# Patient Record
Sex: MALE | Race: BLACK OR AFRICAN AMERICAN | Employment: OTHER | URBAN - METROPOLITAN AREA
[De-identification: names, ages, dates, MRNs, and addresses within clinical notes are randomized per-mention and may not be internally consistent; named-entity substitution may affect disease eponyms.]

---

## 2017-01-10 RX ORDER — FINASTERIDE 5 MG/1
TABLET, FILM COATED ORAL
Qty: 90 TAB | Refills: 0 | Status: SHIPPED | OUTPATIENT
Start: 2017-01-10 | End: 2017-02-21

## 2017-02-21 ENCOUNTER — OFFICE VISIT (OUTPATIENT)
Dept: UROLOGY | Age: 57
End: 2017-02-21

## 2017-02-21 ENCOUNTER — HOSPITAL ENCOUNTER (OUTPATIENT)
Dept: LAB | Age: 57
Discharge: HOME | End: 2017-02-21
Payer: MEDICARE

## 2017-02-21 VITALS
HEIGHT: 69 IN | BODY MASS INDEX: 41.92 KG/M2 | SYSTOLIC BLOOD PRESSURE: 132 MMHG | WEIGHT: 283 LBS | OXYGEN SATURATION: 96 % | HEART RATE: 76 BPM | DIASTOLIC BLOOD PRESSURE: 74 MMHG

## 2017-02-21 DIAGNOSIS — N52.02 CORPORO-VENOUS OCCLUSIVE ERECTILE DYSFUNCTION: Chronic | ICD-10-CM

## 2017-02-21 DIAGNOSIS — N52.02 CORPORO-VENOUS OCCLUSIVE ERECTILE DYSFUNCTION: ICD-10-CM

## 2017-02-21 DIAGNOSIS — N32.81 OAB (OVERACTIVE BLADDER): ICD-10-CM

## 2017-02-21 DIAGNOSIS — R35.1 BPH ASSOCIATED WITH NOCTURIA: Primary | ICD-10-CM

## 2017-02-21 DIAGNOSIS — N40.1 BPH ASSOCIATED WITH NOCTURIA: Primary | ICD-10-CM

## 2017-02-21 DIAGNOSIS — N40.1 BPH ASSOCIATED WITH NOCTURIA: ICD-10-CM

## 2017-02-21 DIAGNOSIS — R35.1 BPH ASSOCIATED WITH NOCTURIA: ICD-10-CM

## 2017-02-21 LAB
BILIRUB UR QL STRIP: NEGATIVE
GLUCOSE UR-MCNC: NORMAL MG/DL
KETONES P FAST UR STRIP-MCNC: NEGATIVE MG/DL
PH UR STRIP: 7.5 [PH] (ref 4.6–8)
PROT UR QL STRIP: NORMAL MG/DL
PSA SERPL-MCNC: 0.5 NG/ML (ref 0–4)
SP GR UR STRIP: 1.02 (ref 1–1.03)
UA UROBILINOGEN AMB POC: NORMAL (ref 0.2–1)
URINALYSIS CLARITY POC: CLEAR
URINALYSIS COLOR POC: YELLOW
URINE BLOOD POC: NORMAL
URINE LEUKOCYTES POC: NORMAL
URINE NITRITES POC: NEGATIVE

## 2017-02-21 PROCEDURE — 84403 ASSAY OF TOTAL TESTOSTERONE: CPT | Performed by: UROLOGY

## 2017-02-21 PROCEDURE — 84153 ASSAY OF PSA TOTAL: CPT | Performed by: UROLOGY

## 2017-02-21 RX ORDER — OXYBUTYNIN CHLORIDE 5 MG/1
5 TABLET ORAL 3 TIMES DAILY
Qty: 60 TAB | Refills: 6 | Status: SHIPPED | OUTPATIENT
Start: 2017-02-21 | End: 2017-02-21

## 2017-02-21 RX ORDER — TAMSULOSIN HYDROCHLORIDE 0.4 MG/1
0.4 CAPSULE ORAL DAILY
Qty: 90 CAP | Refills: 3 | Status: SHIPPED | OUTPATIENT
Start: 2017-02-21 | End: 2017-03-09 | Stop reason: SDUPTHER

## 2017-02-21 NOTE — MR AVS SNAPSHOT
Visit Information Date & Time Provider Department Dept. Phone Encounter #  
 2/21/2017 10:45 AM Andrae Subramanian, [facility] [phone] [id number] Your Appointments 3/9/2017  9:20 AM  
Follow Up with Laura Mccauley MD  
[facility]) Appt Note: Return in about 3 months (around 3/13/2017). with [address] for POA per [address] [facility] [address]  
[phone] [address]  
  
    
 6/8/2017 10:15 AM  
ESTABLISHED PATIENT with Janak Wells MD  
[facility]) Appt Note: 6 months post labs; 6 months post labs/mailed [address] [facility], [address] [facility] [address] 9944 Kiarra Bustamante, [address] Upcoming Health Maintenance Date Due Hepatitis C Screening 1960 HEMOGLOBIN A1C Q6M 1960 FOOT EXAM Q1 3/19/1969 MICROALBUMIN Q1 3/19/1969 EYE EXAM RETINAL OR DILATED Q1 3/19/1969 Pneumococcal 19-64 Medium Risk (1 of 1 - PPSV23) 3/19/1978 DTaP/Tdap/Td series (1 - Tdap) 3/19/1980 COLONOSCOPY 3/19/2014 INFLUENZA AGE 9 TO ADULT 8/1/2016 LIPID PANEL Q1 5/16/2017 Allergies as of 2/21/2017  Review Complete On: 2/21/2017 By: Andrae Subramanian MD  
  
 Severity Noted Reaction Type Reactions Seafood Medium 04/01/2015   Side Effect Hives \"deviled-crabs\"  Does fine with all other seafood Aspirin  03/05/2014    Hives Other reaction(s): Swelling Ditropan [Oxybutynin Chloride]  02/21/2017    Itching Tomato  04/28/2014    Hives Current Immunizations  Never Reviewed Name Date Influenza Vaccine 11/11/2014 Not reviewed this visit You Were Diagnosed With   
  
 Codes Comments BPH associated with nocturia    -  Primary ICD-10-CM: N40.1, R35.1 ICD-9-CM: 600.01, 788.43   
 Corporo-venous occlusive erectile dysfunction     ICD-10-CM: N52.02 
ICD-9-CM: 607.84 Vitals BP Pulse Height(growth percentile) Weight(growth percentile) SpO2 BMI  
 132/74 (BP 1 Location: Right arm, BP Patient Position: Sitting) 76 5' 9\" (1.753 m) 283 lb (128.4 kg) 96% 41.79 kg/m2 Smoking Status Current Some Day Smoker Vitals History BMI and BSA Data Body Mass Index Body Surface Area 41.79 kg/m 2 2.5 m 2 Preferred Pharmacy Pharmacy Name Phone [facility] [address] [facility] [phone] Your Updated Medication List  
  
   
This list is accurate as of: 2/21/17 11:43 AM.  Always use your most recent med list.  
  
  
  
  
 albuterol 90 mcg/actuation inhaler Commonly known as:  PROVENTIL HFA, VENTOLIN HFA, PROAIR HFA  
2 Puffs. amantadine HCl 100 mg capsule Commonly known as:  SYMMETREL  
  
 amLODIPine 10 mg tablet Commonly known as:  Voncile [place] Take 1 Tab by mouth daily. ammonium lactate 12 % lotion Commonly known as:  LAC-HYDRIN  
  
 amoxicillin-clavulanate 875-125 mg per tablet Commonly known as:  AUGMENTIN Azelastine 0.15 % (205.5 mcg) nasal spray Commonly known as:  ASTEPRO  
  
 ciprofloxacin HCl 500 mg tablet Commonly known as:  CIPRO TAKE 1 TABLET BY MOUTH TWICE A DAY FOR 5 DAYS  
  
 clopidogrel 75 mg Tab Commonly known as:  PLAVIX Take 1 Tab by mouth daily. cyclobenzaprine 5 mg tablet Commonly known as:  FLEXERIL Take 5 mg by mouth three (3) times daily as needed for Muscle Spasm(s). Takes 1 to 2 tablets  
  
 docusate sodium 100 mg capsule Commonly known as:  Gevena Living Take 100 mg by mouth daily. escitalopram oxalate 20 mg tablet Commonly known as:  LEXAPRO  
  
 finasteride 5 mg tablet Commonly known as:  PROSCAR Take 1 Tab by mouth daily. Indications: SYMPTOMATIC BENIGN PROSTATIC HYPERPLASIA fluticasone 50 mcg/actuation nasal spray Commonly known as:  Deb Love Two squirts both nostrils at bedtime  
  
 fluticasone-salmeterol 250-50 mcg/dose diskus inhaler Commonly known as:  ADVAIR Take 1 Puff by inhalation every twelve (12) hours. Indications: INTRINSIC ASTHMA  
  
 gabapentin 300 mg capsule Commonly known as:  NEURONTIN Take 1 Cap by mouth three (3) times daily. For nerve pain. 1 cap qhs for 1 wk, then increase to 1 cap BID for 1 wk, then increase to 1 cap TID Insulin Lisp & Lisp Prot (Hum) 100 unit/mL (75-25) Inpn Commonly known as:  HUMALOG 75-25 MIX Inject beneath the skin. NovoLOG Mix 70-30 FlexPen 100 unit/mL (70-30) Inpn Generic drug:  insulin aspart protamine/insulin aspart OTHER  
 Lumbar Traction Belt  
  
 oxybutynin 5 mg tablet Commonly known as:  URQAMBVE Take 1 Tab by mouth three (3) times daily. Indications: BLADDER HYPERACTIVITY  
  
 * oxyCODONE-acetaminophen 5-325 mg per tablet Commonly known as:  PERCOCET Take 1 Tab by mouth every four (4) hours as needed for Pain. Max Daily Amount: 6 Tabs. * oxyCODONE-acetaminophen  mg per tablet Commonly known as:  PERCOCET Take 1 Tab by mouth four (4) times daily as needed for Pain for up to 30 days. Max Daily Amount: 4 Tabs. for chronic, severe, refractory pain  Indications: PAIN Start taking on:  2/22/2017 PATANOL 0.1 % ophthalmic solution Generic drug:  olopatadine  
  
 polyethylene glycol 17 gram/dose powder Commonly known as:  MIRALAX  
  
 prazosin 2 mg capsule Commonly known as:  MINIPRESS Take 1 Cap by mouth nightly. raNITIdine 150 mg tablet Commonly known as:  ZANTAC  
  
 simvastatin 20 mg tablet Commonly known as:  ZOCOR Take 1 Tab by mouth nightly. tamsulosin 0.4 mg capsule Commonly known as:  FLOMAX Take 1 Cap by mouth daily. topiramate 200 mg tablet Commonly known as:  TOPAMAX Take  by mouth two (2) times a day. traZODone 150 mg tablet Commonly known as:  Alexey Harris Take 150 mg by mouth nightly. * trifluoperazine 10 mg tablet Commonly known as:  STELAZINE  
  
 * trifluoperazine 5 mg tablet Commonly known as:  STELAZINE  
TAKE 1 TABLET BY MOUTH AT BEDTIME TAKE WITH 10 MG TABLET = 15 MG TOTAL DOSE AT BEDTIME  
  
 trimethoprim-sulfamethoxazole 160-800 mg per tablet Commonly known as:  BACTRIM DS, SEPTRA DS  
TAKE 1 TABLET BY MOUTH TWICE A DAY  
  
 valsartan-hydroCHLOROthiazide 320-25 mg per tablet Commonly known as:  DIOVAN-HCT  
TAKE 1 TAB BY MOUTH DAILY. vardenafil 20 mg tablet Commonly known as:  LEVITRA Take 20 mg by mouth as needed. ziprasidone 80 mg capsule Commonly known as:  GEODON  
  
 * Notice: This list has 4 medication(s) that are the same as other medications prescribed for you. Read the directions carefully, and ask your doctor or other care provider to review them with you. We Performed the Following AMB POC URINALYSIS DIP STICK AUTO W/O MICRO [76345 CPT(R)] Patient Instructions [facility] Activation Thank you for requesting access to [facility]. Please follow the instructions below to securely access and download your online medical record. [facility] allows you to send messages to your doctor, view your test results, renew your prescriptions, schedule appointments, and more. How Do I Sign Up? 1. In your internet browser, go to [email or web address]. 2. Click on the First Time User? Click Here link in the Sign In box. You will see the New Member Sign Up page. 3. Enter your [facility] Access Code exactly as it appears below. You will not need to use this code after youve completed the sign-up process. If you do not sign up before the expiration date, you must request a new code. [facility] Access Code: Activation code not generated Current [facility] Status: Patient Declined (This is the date your [facility] access code will ) 4. Enter the last four digits of your Social Security Number (xxxx) and Date of Birth (mm/dd/yyyy) as indicated and click Submit. You will be taken to the next sign-up page. 5. Create a [employer] ID. This will be your [employer] login ID and cannot be changed, so think of one that is secure and easy to remember. 6. Create a [employer] password. You can change your password at any time. 7. Enter your Password Reset Question and Answer. This can be used at a later time if you forget your password. 8. Enter your e-mail address. You will receive e-mail notification when new information is available in [address] 9. Click Sign Up. You can now view and download portions of your medical record. 10. Click the Download Summary menu link to download a portable copy of your medical information. Additional Information If you have questions, please visit the Frequently Asked Questions section of the [employer] website at [email or web address] Remember, [employer] is NOT to be used for urgent needs. For medical emergencies, dial 911. Please provide this summary of care documentation to your next provider. Your primary care clinician is listed as RAMAKRISHNA GROSSMAN. If you have any questions after today's visit, please call [phone].

## 2017-02-21 NOTE — PROGRESS NOTES
Mr. Alma Joseph has a reminder for a \"due or due soon\" health maintenance. I have asked that he contact his primary care provider for follow-up on this health maintenance.

## 2017-02-21 NOTE — PATIENT INSTRUCTIONS
[employer] Activation    Thank you for requesting access to [facility]. Please follow the instructions below to securely access and download your online medical record. [facility] allows you to send messages to your doctor, view your test results, renew your prescriptions, schedule appointments, and more. How Do I Sign Up? 1. In your internet browser, go to [email or web address]. 2. Click on the First Time User? Click Here link in the Sign In box. You will see the New Member Sign Up page. 3. Enter your [facility] Access Code exactly as it appears below. You will not need to use this code after youve completed the sign-up process. If you do not sign up before the expiration date, you must request a new code. [facility] Access Code: Activation code not generated  Current [facility] Status: Patient Declined (This is the date your [facility] access code will )    4. Enter the last four digits of your Social Security Number (xxxx) and Date of Birth (mm/dd/yyyy) as indicated and click Submit. You will be taken to the next sign-up page. 5. Create a [facility]y ID. This will be your [facility] login ID and cannot be changed, so think of one that is secure and easy to remember. 6. Create a [facility] password. You can change your password at any time. 7. Enter your Password Reset Question and Answer. This can be used at a later time if you forget your password. 8. Enter your e-mail address. You will receive e-mail notification when new information is available in [address] 9. Click Sign Up. You can now view and download portions of your medical record. 10. Click the Download Summary menu link to download a portable copy of your medical information. Additional Information    If you have questions, please visit the Frequently Asked Questions section of the [employer] website at [email or web address] Remember, [facility] is NOT to be used for urgent needs. For medical emergencies, dial 911.

## 2017-02-22 ENCOUNTER — DOCUMENTATION ONLY (OUTPATIENT)
Dept: UROLOGY | Age: 57
End: 2017-02-22

## 2017-02-22 LAB
COMMENT, TESC2: NORMAL
TESTOST SERPL-MCNC: 452 NG/DL (ref 348–1197)

## 2017-02-22 NOTE — PROGRESS NOTES
Raul So 61 y.o. male     Mr. Ana Rosa Ag seen today for follow-up symptomatic BPH status post TURP in July 2016 relieving obstruction caused by granulation tissue building up following initial TURP in May 2016 patient is now voiding with a forceful stream and good control  -urgency and frequency treated with anticholinergic medication but patient discontinued taking Ditropan because of itching-now on 5 alpha reductase inhibitor therapy and alpha-blocker  Patient complains of easy fatigue and lack of energy as well as-patient also notes failure of ejaculation since beginning therapy with alpha-blocker Flomax    TURP pathology report compliance benign TURP chips-no evidence of malignancy     PVR 50 cc in August 2014      PSA 1.5 in July 2014  PSA 1.0 in December 2015      Review of Systems; no significant changes since 27 March 2015  CNS no seizures syncope headaches or dizziness/  Pulmonary reactive airway disease with wheezing/Emphysema  Cardiovascular hypertension no chest pain or palpitations/DVT                                                           Plavix anticoagulation discontinued in 2013  [id number] change in bowel habits  Genitourinary-symptomatic BPH  Musculoskeletal-chronic pain in the neck shoulders and knees  Endocrine-diabetes  Other-      Allergies: Allergies   Allergen Reactions    Seafood Hives     \"deviled-crabs\"  Does fine with all other seafood    Aspirin Hives     Other reaction(s): Swelling    Ditropan [Oxybutynin Chloride] Itching    Tomato Hives      Medications:    Current Outpatient Prescriptions   Medication Sig Dispense Refill    tamsulosin (FLOMAX) 0.4 mg capsule Take 1 Cap by mouth daily. 90 Cap 3    oxybutynin (DITROPAN) 5 mg tablet Take 1 Tab by mouth three (3) times daily. Indications: BLADDER HYPERACTIVITY, INCREASED URINARY FREQUENCY, URINARY URGENCY 60 Tab 6    finasteride (PROSCAR) 5 mg tablet Take 1 Tab by mouth daily.  Indications: SYMPTOMATIC BENIGN PROSTATIC HYPERPLASIA 90 Tab 3    oxybutynin (DITROPAN) 5 mg tablet Take 1 Tab by mouth three (3) times daily. Indications: BLADDER HYPERACTIVITY 60 Tab 6    [START ON 2/22/2017] oxyCODONE-acetaminophen (PERCOCET)  mg per tablet Take 1 Tab by mouth four (4) times daily as needed for Pain for up to 30 days. Max Daily Amount: 4 Tabs. for chronic, severe, refractory pain  Indications: PAIN 120 Tab 0    valsartan-hydroCHLOROthiazide (DIOVAN-HCT) 320-25 mg per tablet TAKE 1 TAB BY MOUTH DAILY. 90 Tab 1    ciprofloxacin HCl (CIPRO) 500 mg tablet TAKE 1 TABLET BY MOUTH TWICE A DAY FOR 5 DAYS 10 Tab 0    trifluoperazine (STELAZINE) 5 mg tablet TAKE 1 TABLET BY MOUTH AT BEDTIME TAKE WITH 10 MG TABLET = 15 MG TOTAL DOSE AT BEDTIME  2    NOVOLOG MIX 70-30 FLEXPEN 100 unit/mL (70-30) inpn   3    simvastatin (ZOCOR) 20 mg tablet Take 1 Tab by mouth nightly. (Patient taking differently: Take 40 mg by mouth nightly.) 90 Tab 1    prazosin (MINIPRESS) 2 mg capsule Take 1 Cap by mouth nightly. 30 Cap 3    amantadine HCl (SYMMETREL) 100 mg capsule       ammonium lactate (LAC-HYDRIN) 12 % lotion       Azelastine (ASTEPRO) 0.15 % (205.5 mcg) nasal spray       escitalopram oxalate (LEXAPRO) 20 mg tablet       polyethylene glycol (MIRALAX) 17 gram/dose powder       ranitidine (ZANTAC) 150 mg tablet       ziprasidone (GEODON) 80 mg capsule       OTHER  Lumbar Traction Belt 1 Device prn    amLODIPine (NORVASC) 10 mg tablet Take 1 Tab by mouth daily. 90 Tab 3    tamsulosin (FLOMAX) 0.4 mg capsule Take 1 Cap by mouth daily. 30 Cap 3    fluticasone (FLONASE) 50 mcg/actuation nasal spray Two squirts both nostrils at bedtime 1 Bottle 3    cyclobenzaprine (FLEXERIL) 5 mg tablet Take 5 mg by mouth three (3) times daily as needed for Muscle Spasm(s). Takes 1 to 2 tablets      docusate sodium (COLACE) 100 mg capsule Take 100 mg by mouth daily.  traZODone (DESYREL) 150 mg tablet Take 150 mg by mouth nightly.  gabapentin (NEURONTIN) 300 mg capsule Take 1 Cap by mouth three (3) times daily. For nerve pain. 1 cap qhs for 1 wk, then increase to 1 cap BID for 1 wk, then increase to 1 cap TID 90 Cap 5    clopidogrel (PLAVIX) 75 mg tablet Take 1 Tab by mouth daily. 90 Tab 1    trimethoprim-sulfamethoxazole (BACTRIM DS, SEPTRA DS) 160-800 mg per tablet TAKE 1 TABLET BY MOUTH TWICE A DAY 20 Tab 0    amoxicillin-clavulanate (AUGMENTIN) 875-125 mg per tablet   0    trifluoperazine (STELAZINE) 10 mg tablet   2    oxyCODONE-acetaminophen (PERCOCET) 5-325 mg per tablet Take 1 Tab by mouth every four (4) hours as needed for Pain. Max Daily Amount: 6 Tabs. 30 Tab 0    albuterol (PROVENTIL HFA, VENTOLIN HFA, PROAIR HFA) 90 mcg/actuation inhaler 2 Puffs.  Insulin Lisp & Lisp Prot, Hum, (HUMALOG 75-25 MIX) 100 unit/mL (75-25) flexpen Inject beneath the skin.  PATANOL 0.1 % ophthalmic solution       vardenafil (LEVITRA) 20 mg tablet Take 20 mg by mouth as needed. 2 Tab 11    fluticasone-salmeterol (ADVAIR) 250-50 mcg/dose diskus inhaler Take 1 Puff by inhalation every twelve (12) hours. Indications: INTRINSIC ASTHMA 1 Inhaler 6    topiramate (TOPAMAX) 200 mg tablet Take  by mouth two (2) times a day.          Past Medical History   Diagnosis Date    Allergic rhinitis     Asthma     Atherosclerosis of native coronary artery with angina pectoris ([address] 5/14/2015     atypical angina, mild stress abnormality may be artifact nl EF by echo     BPH (benign prostatic hyperplasia)     Chronic pain      shoulder, neck, knee    Coronary artery disease involving native coronary artery of native heart with angina pectoris ([address] 5/13/2016     chr atypical CP for few seconds only     Diabetes mellitus ([address]     DVT (deep venous thrombosis) ([address] 2013     left leg    Emphysema     Essential hypertension     GERD (gastroesophageal reflux disease)     High cholesterol     Impotence     Morbid obesity ([address] 4/1/2015    Other and unspecified hyperlipidemia 4/1/2015    [facility]anoid schizophrenia [facility]     Preop cardiovascular exam 11/13/2015     wrist surgery; no significant CP; abnl stress ? artifact cleared with mild risk periop     Sciatica     Severe obesity (BMI >= 40) ([facility]) 11/13/2015    Sinusitis     Type II or unspecified type diabetes mellitus without mention of complication, not stated as uncontrolled 4/1/2015      Past Surgical History   Procedure Laterality Date    Hx other surgical  2008     sinus    Hx other surgical  2009     stress test    Hx turp  5/2016     Family History   Problem Relation Age of Onset    Stroke Mother 79    Heart Disease Mother     Diabetes Father     Cancer Brother     Heart Attack Neg Hx         Physical Examination: Well-nourished mature male in no apparent distress      Urinalysis: ++ Protein/+ glucose/negative heme/negative nitrite    PVR today 303 cc    Impression: Symptomatic BPH with TURP complicated by keloid-like granulation tissue/persistent irritative voiding symptoms                        Ditropan allergy                       Erectile dysfunction with an ejaculation        Plan: Flomax 0.4 mg daily             Proscar 5 mg daily                                 PSA and testosterone levels today    Described discussed adverse alpha-blocker effect on ejaculation-benefit of alpha-blocker effect on bladder outlet weighed against detrimental effect on ejaculation      rtc 4 weeks-fu ED    More than 1/2 of this 25 minute visit was spent in counselling and coordination of care, as described above. Isidoro Rodríguez MD  -electronically signed-    PLEASE NOTE:  This document has been produced using voice recognition software. Unrecognized errors in transcription may be present.

## 2017-03-09 ENCOUNTER — OFFICE VISIT (OUTPATIENT)
Dept: PAIN MANAGEMENT | Age: 57
End: 2017-03-09

## 2017-03-09 VITALS
HEART RATE: 103 BPM | BODY MASS INDEX: 41.79 KG/M2 | DIASTOLIC BLOOD PRESSURE: 79 MMHG | SYSTOLIC BLOOD PRESSURE: 112 MMHG | WEIGHT: 283 LBS

## 2017-03-09 DIAGNOSIS — F45.42 PAIN DISORDER WITH PSYCHOLOGICAL COMPONENT: ICD-10-CM

## 2017-03-09 DIAGNOSIS — M47.817 LUMBOSACRAL SPONDYLOSIS WITHOUT MYELOPATHY: ICD-10-CM

## 2017-03-09 DIAGNOSIS — M54.42 MIDLINE LOW BACK PAIN WITH LEFT-SIDED SCIATICA, UNSPECIFIED CHRONICITY: ICD-10-CM

## 2017-03-09 DIAGNOSIS — M51.37 DEGENERATION OF LUMBAR OR LUMBOSACRAL INTERVERTEBRAL DISC: ICD-10-CM

## 2017-03-09 DIAGNOSIS — M54.12 CERVICAL RADICULOPATHY: Primary | ICD-10-CM

## 2017-03-09 DIAGNOSIS — Z79.899 ENCOUNTER FOR LONG-TERM (CURRENT) USE OF MEDICATIONS: ICD-10-CM

## 2017-03-09 DIAGNOSIS — M54.16 LUMBAR NEURITIS: ICD-10-CM

## 2017-03-09 DIAGNOSIS — Z79.899 ENCOUNTER FOR LONG-TERM (CURRENT) DRUG USE: ICD-10-CM

## 2017-03-09 DIAGNOSIS — G56.02 CARPAL TUNNEL SYNDROME OF LEFT WRIST: ICD-10-CM

## 2017-03-09 DIAGNOSIS — G89.4 CHRONIC PAIN SYNDROME: ICD-10-CM

## 2017-03-09 LAB
ALCOHOL UR POC: NORMAL
AMPHETAMINES UR POC: NEGATIVE
BARBITURATES UR POC: NEGATIVE
BENZODIAZEPINES UR POC: NEGATIVE
BUPRENORPHINE UR POC: NORMAL
CANNABINOIDS UR POC: NEGATIVE
CARISOPRODOL UR POC: NORMAL
COCAINE UR POC: NEGATIVE
FENTANYL UR POC: NORMAL
MDMA/ECSTASY UR POC: NEGATIVE
METHADONE UR POC: NEGATIVE
METHAMPHETAMINE UR POC: NEGATIVE
METHYLPHENIDATE UR POC: NEGATIVE
OPIATES UR POC: NEGATIVE
OXYCODONE UR POC: NORMAL
PHENCYCLIDINE UR POC: NEGATIVE
PROPOXYPHENE UR POC: NORMAL
TRAMADOL UR POC: NORMAL
TRICYCLICS UR POC: NEGATIVE

## 2017-03-09 RX ORDER — OXYCODONE AND ACETAMINOPHEN 10; 325 MG/1; MG/1
1 TABLET ORAL
Qty: 120 TAB | Refills: 0 | Status: SHIPPED | OUTPATIENT
Start: 2017-04-21 | End: 2017-05-21

## 2017-03-09 RX ORDER — OXYCODONE AND ACETAMINOPHEN 10; 325 MG/1; MG/1
1 TABLET ORAL
Qty: 120 TAB | Refills: 0 | Status: SHIPPED | OUTPATIENT
Start: 2017-03-23 | End: 2017-04-22

## 2017-03-09 RX ORDER — OXYCODONE AND ACETAMINOPHEN 10; 325 MG/1; MG/1
1 TABLET ORAL
Qty: 120 TAB | Refills: 0 | Status: SHIPPED | OUTPATIENT
Start: 2017-05-19 | End: 2017-06-05 | Stop reason: SDUPTHER
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
## 2017-03-09 NOTE — MR AVS SNAPSHOT
Visit Information Date & Time Provider Department Dept. Phone Encounter #  
 3/9/2017  9:20 AM Laura Mccauley MD [facility] [phone] Follow-up Instructions Return in about 3 months (around 6/9/2017). Your Appointments 3/28/2017  9:45 AM  
Office Visit with Andrae Subramanian MD  
[facility]) Appt Note: check up [address] [address]  
[phone] [address]  
  
    
 6/8/2017 10:15 AM  
ESTABLISHED PATIENT with Janak Wells MD  
[facility]) Appt Note: 6 months post labs; 6 months post labs/mailed [address] [facility], [facility] [address] [facility] [address] [address] [facility] [place], [address] [place] [address] Upcoming Health Maintenance Date Due Hepatitis C Screening 1960 HEMOGLOBIN A1C Q6M 1960 FOOT EXAM Q1 3/19/1969 MICROALBUMIN Q1 3/19/1969 EYE EXAM RETINAL OR DILATED Q1 3/19/1969 Pneumococcal 19-64 Medium Risk (1 of 1 - PPSV23) 3/19/1978 DTaP/Tdap/Td series (1 - Tdap) 3/19/1980 COLONOSCOPY 3/19/2014 INFLUENZA AGE 9 TO ADULT 8/1/2016 LIPID PANEL Q1 5/16/2017 Allergies as of 3/9/2017  Review Complete On: 3/9/2017 By: Laura Mccauley MD  
  
 Severity Noted Reaction Type Reactions Seafood Medium 04/01/2015   Side Effect Hives \"deviled-crabs\"  Does fine with all other seafood Aspirin  03/05/2014    Hives Other reaction(s): Swelling Ditropan [Oxybutynin Chloride]  02/21/2017    Itching Tomato  04/28/2014    Hives Current Immunizations  Never Reviewed Name Date Influenza Vaccine 11/11/2014 Not reviewed this visit You Were Diagnosed With   
  
 Codes Comments Encounter for long-term (current) use of medications    -  Primary ICD-10-CM: D46.718 ICD-9-CM: V58.69 Vitals BP Pulse Weight(growth percentile) BMI Smoking Status 112/79 (!) 103 283 lb (128.4 kg) 41.79 kg/m2 Current Some Day Smoker BMI and BSA Data Body Mass Index Body Surface Area 41.79 kg/m 2 2.5 m 2 Preferred Pharmacy Pharmacy Name Phone [facility] [address] [facility] [phone] Your Updated Medication List  
  
   
This list is accurate as of: 3/9/17 10:05 AM.  Always use your most recent med list.  
  
  
  
  
 albuterol 90 mcg/actuation inhaler Commonly known as:  PROVENTIL HFA, VENTOLIN HFA, PROAIR HFA  
2 Puffs. amantadine HCl 100 mg capsule Commonly known as:  SYMMETREL  
  
 amLODIPine 10 mg tablet Commonly known as:  Maldonado Cater Take 1 Tab by mouth daily. ammonium lactate 12 % lotion Commonly known as:  LAC-HYDRIN  
  
 amoxicillin-clavulanate 875-125 mg per tablet Commonly known as:  AUGMENTIN Azelastine 0.15 % (205.5 mcg) nasal spray Commonly known as:  ASTEPRO  
  
 ciprofloxacin HCl 500 mg tablet Commonly known as:  CIPRO TAKE 1 TABLET BY MOUTH TWICE A DAY FOR 5 DAYS  
  
 clopidogrel 75 mg Tab Commonly known as:  PLAVIX Take 1 Tab by mouth daily. cyclobenzaprine 5 mg tablet Commonly known as:  FLEXERIL Take 5 mg by mouth three (3) times daily as needed for Muscle Spasm(s). Takes 1 to 2 tablets  
  
 docusate sodium 100 mg capsule Commonly known as:  Luwana Oren Take 100 mg by mouth daily. escitalopram oxalate 20 mg tablet Commonly known as:  LEXAPRO  
  
 finasteride 5 mg tablet Commonly known as:  PROSCAR Take 1 Tab by mouth daily. Indications: SYMPTOMATIC BENIGN PROSTATIC HYPERPLASIA  
  
 fluticasone 50 mcg/actuation nasal spray Commonly known as:  Madai Reges Two squirts both nostrils at bedtime  
  
 fluticasone-salmeterol 250-50 mcg/dose diskus inhaler Commonly known as:  ADVAIR Take 1 Puff by inhalation every twelve (12) hours.  Indications: INTRINSIC ASTHMA  
  
 gabapentin 300 mg capsule Commonly known as:  NEURONTIN Take 1 Cap by mouth three (3) times daily. For nerve pain. 1 cap qhs for 1 wk, then increase to 1 cap BID for 1 wk, then increase to 1 cap TID Insulin Lisp & Lisp Prot (Hum) 100 unit/mL (75-25) Inpn Commonly known as:  HUMALOG 75-25 MIX Inject beneath the skin. NovoLOG Mix 70-30 FlexPen 100 unit/mL (70-30) Inpn Generic drug:  insulin aspart protamine/insulin aspart OTHER  
 Lumbar Traction Belt * oxyCODONE-acetaminophen  mg per tablet Commonly known as:  PERCOCET Take 1 Tab by mouth four (4) times daily as needed for Pain for up to 30 days. Max Daily Amount: 4 Tabs. for chronic, severe, refractory pain  Indications: Pain Start taking on:  3/23/2017 * oxyCODONE-acetaminophen  mg per tablet Commonly known as:  PERCOCET Take 1 Tab by mouth four (4) times daily as needed for Pain for up to 30 days. Max Daily Amount: 4 Tabs. for chronic, severe, refractory pain  Indications: Pain Start taking on:  4/21/2017 * oxyCODONE-acetaminophen  mg per tablet Commonly known as:  PERCOCET Take 1 Tab by mouth four (4) times daily as needed for Pain for up to 30 days. Max Daily Amount: 4 Tabs. for chronic, severe, refractory pain  Indications: Pain Start taking on:  5/19/2017 PATANOL 0.1 % ophthalmic solution Generic drug:  olopatadine  
  
 polyethylene glycol 17 gram/dose powder Commonly known as:  MIRALAX  
  
 prazosin 2 mg capsule Commonly known as:  MINIPRESS Take 1 Cap by mouth nightly. raNITIdine 150 mg tablet Commonly known as:  ZANTAC  
  
 simvastatin 20 mg tablet Commonly known as:  ZOCOR Take 1 Tab by mouth nightly. tamsulosin 0.4 mg capsule Commonly known as:  FLOMAX Take 1 Cap by mouth daily. topiramate 200 mg tablet Commonly known as:  TOPAMAX Take  by mouth two (2) times a day. traZODone 150 mg tablet Commonly known as:  Avani Cast Take 150 mg by mouth nightly. * trifluoperazine 10 mg tablet Commonly known as:  STELAZINE  
  
 * trifluoperazine 5 mg tablet Commonly known as:  STELAZINE  
TAKE 1 TABLET BY MOUTH AT BEDTIME TAKE WITH 10 MG TABLET = 15 MG TOTAL DOSE AT BEDTIME  
  
 trimethoprim-sulfamethoxazole 160-800 mg per tablet Commonly known as:  BACTRIM DS, SEPTRA DS  
TAKE 1 TABLET BY MOUTH TWICE A DAY  
  
 valsartan-hydroCHLOROthiazide 320-25 mg per tablet Commonly known as:  DIOVAN-HCT  
TAKE 1 TAB BY MOUTH DAILY. vardenafil 20 mg tablet Commonly known as:  LEVITRA Take 20 mg by mouth as needed. ziprasidone 80 mg capsule Commonly known as:  GEODON  
  
 * Notice: This list has 5 medication(s) that are the same as other medications prescribed for you. Read the directions carefully, and ask your doctor or other care provider to review them with you. Prescriptions Printed Refills  
 oxyCODONE-acetaminophen (PERCOCET)  mg per tablet 0 Starting on: 5/19/2017 Sig: Take 1 Tab by mouth four (4) times daily as needed for Pain for up to 30 days. Max Daily Amount: 4 Tabs. for chronic, severe, refractory pain  Indications: Pain Class: Print Route: Oral  
 oxyCODONE-acetaminophen (PERCOCET)  mg per tablet 0 Starting on: 4/21/2017 Sig: Take 1 Tab by mouth four (4) times daily as needed for Pain for up to 30 days. Max Daily Amount: 4 Tabs. for chronic, severe, refractory pain  Indications: Pain Class: Print Route: Oral  
 oxyCODONE-acetaminophen (PERCOCET)  mg per tablet 0 Starting on: 3/23/2017 Sig: Take 1 Tab by mouth four (4) times daily as needed for Pain for up to 30 days. Max Daily Amount: 4 Tabs. for chronic, severe, refractory pain  Indications: Pain Class: Print Route: Oral  
  
We Performed the Following AMB POC DRUG SCREEN () [ [facility]] DRUG SCREEN [[id number] Custom] Follow-up Instructions Return in about 3 months (around 6/9/2017). Patient Instructions Current health maintenance issues were reviewed and the patient was advised to followup with his/her PCP for completion of these items. Please provide this summary of care documentation to your next provider. Your primary care clinician is listed as RAMAKRISHNA GROSSMAN. If you have any questions after today's visit, please call [phone].

## 2017-03-09 NOTE — PROGRESS NOTES
Nursing Notes    Patient presents to the office today in follow-up. Patient rates his pain at 8/10 on the numerical pain scale. Reviewed medications with counts as follows:    Rx Date filled Qty Dispensed Pill Count Last Dose Short   Percocet 10 2/22/17 120 59 3/9/17 no       Comments: pt given vicodin and triazolam for sinus surgery along with steroids    POC UDS was performed in office today    Any new labs or imaging since last appointment? YES, pre op labs and imaging    Have you been to an emergency room (ER) or urgent care clinic since your last visit? NO            Have you been hospitalized since your last visit? NO     If yes, where, when, and reason for visit? Have you seen or consulted any other health care providers outside of the [address] [place] [address]  since your last visit? YES     If yes, where, when, and reason for visit? ENT surgeon for sinus surgery    Mr. Sy Velazquez has a reminder for a \"due or due soon\" health maintenance. I have asked that he contact his primary care provider for follow-up on this health maintenance.

## 2017-03-23 ENCOUNTER — TELEPHONE (OUTPATIENT)
Dept: PAIN MANAGEMENT | Age: 57
End: 2017-03-23

## 2017-03-23 NOTE — TELEPHONE ENCOUNTER
The pt called the office to report medications that he received while he was in the ER. They gave the pt a muscle relaxer. He was in the ER for a knee injury. He went to [address] [place] [address] yesterday and was given a prescription for hydrocodone 7.5/325 mg #30 tabs. A chart review was done and he is currently taking percocet 10/325 mg QID prn. The pt states that he sprained his right knee and is waiting to get an MRI.

## 2017-03-24 NOTE — TELEPHONE ENCOUNTER
I spoke to Dr. Homar Chawla about the pt's new prescription. He states that he will need to stop taking the percocet while he takes the hydrocodone. Once he is finished with the hydrocodone he can resume taking the percocet. I called and spoke to the pt. This information was relayed to him. He verbalized understanding and has no other questions at this time.

## 2017-03-28 ENCOUNTER — OFFICE VISIT (OUTPATIENT)
Dept: UROLOGY | Age: 57
End: 2017-03-28

## 2017-03-28 VITALS
DIASTOLIC BLOOD PRESSURE: 80 MMHG | OXYGEN SATURATION: 98 % | SYSTOLIC BLOOD PRESSURE: 130 MMHG | HEIGHT: 69 IN | TEMPERATURE: 98.3 F | HEART RATE: 98 BPM

## 2017-03-28 DIAGNOSIS — R35.1 BPH ASSOCIATED WITH NOCTURIA: Primary | ICD-10-CM

## 2017-03-28 DIAGNOSIS — N40.1 BPH ASSOCIATED WITH NOCTURIA: Primary | ICD-10-CM

## 2017-03-28 DIAGNOSIS — N52.02 CORPORO-VENOUS OCCLUSIVE ERECTILE DYSFUNCTION: ICD-10-CM

## 2017-03-28 LAB
BILIRUB UR QL STRIP: NEGATIVE
GLUCOSE UR-MCNC: NORMAL MG/DL
KETONES P FAST UR STRIP-MCNC: NEGATIVE MG/DL
PH UR STRIP: 6 [PH] (ref 4.6–8)
PROT UR QL STRIP: NORMAL MG/DL
SP GR UR STRIP: 1.01 (ref 1–1.03)
UA UROBILINOGEN AMB POC: NORMAL (ref 0.2–1)
URINALYSIS CLARITY POC: CLEAR
URINALYSIS COLOR POC: YELLOW
URINE BLOOD POC: NORMAL
URINE LEUKOCYTES POC: NORMAL
URINE NITRITES POC: NEGATIVE

## 2017-03-28 RX ORDER — VARDENAFIL HYDROCHLORIDE 20 MG/1
20 TABLET ORAL AS NEEDED
Qty: 2 TAB | Refills: 11 | Status: SHIPPED | OUTPATIENT
Start: 2017-03-28 | End: 2017-03-28 | Stop reason: CLARIF

## 2017-03-28 RX ORDER — SILDENAFIL 100 MG/1
100 TABLET, FILM COATED ORAL AS NEEDED
Qty: 6 TAB | Refills: 11 | Status: SHIPPED | OUTPATIENT
Start: 2017-03-28 | End: 2017-06-08

## 2017-03-28 RX ORDER — TADALAFIL 20 MG/1
20 TABLET ORAL AS NEEDED
Qty: 6 TAB | Refills: 11 | Status: SHIPPED | OUTPATIENT
Start: 2017-03-28 | End: 2017-06-08

## 2017-03-28 NOTE — PATIENT INSTRUCTIONS

## 2017-03-28 NOTE — MR AVS SNAPSHOT
Visit Information Date & Time Provider Department Dept. Phone Encounter #  
 3/28/2017  9:45 AM Payton Valentine, [place] [facility] [phone] [id number] Follow-up Instructions Return in about 6 months (around 9/28/2017) for Follow-up symptomatic BPH-erectile dysfunction. Follow-up and Disposition History Your Appointments 6/5/2017  9:20 AM  
Follow Up with Miyram Perez PA-C [facility] (JESSICA SCHEDULING) Appt Note: return  3  
 [address] [facility] [address]  
[phone] [address]  
  
    
 6/8/2017 10:15 AM  
ESTABLISHED PATIENT with oLuisa Arboleda MD  
[facility]) Appt Note: 6 months post labs; 6 months post labs/mailed [address] [facility], [address] [place] [address]  
[facility], [address] [place] [address]  
  
    
 9/28/2017  9:30 AM  
Office Visit with Payton Valentine MD  
[facility]) Appt Note: checkup [address] [facility] [address] [facility] [address]  
[phone] [address] [facility] [address] Upcoming Health Maintenance Date Due Hepatitis C Screening 1960 HEMOGLOBIN A1C Q6M 1960 FOOT EXAM Q1 3/19/1969 MICROALBUMIN Q1 3/19/1969 EYE EXAM RETINAL OR DILATED Q1 3/19/1969 Pneumococcal 19-64 Medium Risk (1 of 1 - PPSV23) 3/19/1978 DTaP/Tdap/Td series (1 - Tdap) 3/19/1980 COLONOSCOPY 3/19/2014 INFLUENZA AGE 9 TO ADULT 8/1/2016 LIPID PANEL Q1 5/16/2017 Allergies as of 3/28/2017  Review Complete On: 3/28/2017 By: Payton Valentine MD  
  
 Severity Noted Reaction Type Reactions Seafood Medium 04/01/2015   Side Effect Hives \"deviled-crabs\"  Does fine with all other seafood Aspirin  03/05/2014    Hives Other reaction(s): Swelling Ditropan [Oxybutynin Chloride]  02/21/2017    Itching Tomato  04/28/2014    Hives Current Immunizations  Never Reviewed Name Date Influenza Vaccine 11/11/2014 Not reviewed this visit You Were Diagnosed With   
  
 Codes Comments BPH associated with nocturia    -  Primary ICD-10-CM: N40.1, R35.1 ICD-9-CM: 600.01, 788.43 Corporo-venous occlusive erectile dysfunction     ICD-10-CM: N52.02 
ICD-9-CM: 607.84 Vitals BP Pulse Temp Height(growth percentile) SpO2 Smoking Status 130/80 (BP 1 Location: Left arm, BP Patient Position: Sitting) 98 98.3 °F (36.8 °C) (Oral) 5' 9\" (1.753 m) 98% Current Some Day Smoker Vitals History Preferred Pharmacy Pharmacy Name Phone [facility] [phone] Your Updated Medication List  
  
   
This list is accurate as of: 3/28/17 11:59 PM.  Always use your most recent med list.  
  
  
  
  
 albuterol 90 mcg/actuation inhaler Commonly known as:  PROVENTIL HFA, VENTOLIN HFA, PROAIR HFA  
2 Puffs. amantadine HCl 100 mg capsule Commonly known as:  SYMMETREL  
  
 amLODIPine 10 mg tablet Commonly known as:  Jhon Mend Take 1 Tab by mouth daily. ammonium lactate 12 % lotion Commonly known as:  LAC-HYDRIN  
  
 amoxicillin-clavulanate 875-125 mg per tablet Commonly known as:  AUGMENTIN Azelastine 0.15 % (205.5 mcg) nasal spray Commonly known as:  ASTEPRO  
  
 ciprofloxacin HCl 500 mg tablet Commonly known as:  CIPRO TAKE 1 TABLET BY MOUTH TWICE A DAY FOR 5 DAYS  
  
 clopidogrel 75 mg Tab Commonly known as:  PLAVIX Take 1 Tab by mouth daily. cyclobenzaprine 5 mg tablet Commonly known as:  FLEXERIL Take 5 mg by mouth three (3) times daily as needed for Muscle Spasm(s). Takes 1 to 2 tablets  
  
 docusate sodium 100 mg capsule Commonly known as:  Vianney Neighbor Take 100 mg by mouth daily. escitalopram oxalate 20 mg tablet Commonly known as:  LEXAPRO  
  
 finasteride 5 mg tablet Commonly known as:  PROSCAR Take 1 Tab by mouth daily. Indications: SYMPTOMATIC BENIGN PROSTATIC HYPERPLASIA  
  
 fluticasone 50 mcg/actuation nasal spray Commonly known as:  Ilss Almonte Two squirts both nostrils at bedtime  
  
 fluticasone-salmeterol 250-50 mcg/dose diskus inhaler Commonly known as:  ADVAIR Take 1 Puff by inhalation every twelve (12) hours. Indications: INTRINSIC ASTHMA  
  
 gabapentin 300 mg capsule Commonly known as:  NEURONTIN Take 1 Cap by mouth three (3) times daily. For nerve pain. 1 cap qhs for 1 wk, then increase to 1 cap BID for 1 wk, then increase to 1 cap TID Insulin Lisp & Lisp Prot (Hum) 100 unit/mL (75-25) Inpn Commonly known as:  HUMALOG 75-25 MIX Inject beneath the skin. NovoLOG Mix 70-30 FlexPen 100 unit/mL (70-30) Inpn Generic drug:  insulin aspart protamine/insulin aspart OTHER  
 Lumbar Traction Belt * oxyCODONE-acetaminophen  mg per tablet Commonly known as:  PERCOCET Take 1 Tab by mouth four (4) times daily as needed for Pain for up to 30 days. Max Daily Amount: 4 Tabs. for chronic, severe, refractory pain  Indications: Pain * oxyCODONE-acetaminophen  mg per tablet Commonly known as:  PERCOCET Take 1 Tab by mouth four (4) times daily as needed for Pain for up to 30 days. Max Daily Amount: 4 Tabs. for chronic, severe, refractory pain  Indications: Pain Start taking on:  4/21/2017 * oxyCODONE-acetaminophen  mg per tablet Commonly known as:  PERCOCET Take 1 Tab by mouth four (4) times daily as needed for Pain for up to 30 days. Max Daily Amount: 4 Tabs. for chronic, severe, refractory pain  Indications: Pain Start taking on:  5/19/2017 PATANOL 0.1 % ophthalmic solution Generic drug:  olopatadine  
  
 polyethylene glycol 17 gram/dose powder Commonly known as:  MIRALAX  
  
 prazosin 2 mg capsule Commonly known as:  MINIPRESS Take 1 Cap by mouth nightly. raNITIdine 150 mg tablet Commonly known as:  ZANTAC  
  
 sildenafil citrate 100 mg tablet Commonly known as:  VIAGRA Take 1 Tab by mouth as needed. simvastatin 20 mg tablet Commonly known as:  ZOCOR Take 1 Tab by mouth nightly. tadalafil 20 mg tablet Commonly known as:  CIALIS Take 1 Tab by mouth as needed. tamsulosin 0.4 mg capsule Commonly known as:  FLOMAX Take 1 Cap by mouth daily. topiramate 200 mg tablet Commonly known as:  TOPAMAX Take  by mouth two (2) times a day. traZODone 150 mg tablet Commonly known as:  Madolyn Saas Take 150 mg by mouth nightly. * trifluoperazine 10 mg tablet Commonly known as:  STELAZINE  
  
 * trifluoperazine 5 mg tablet Commonly known as:  STELAZINE  
TAKE 1 TABLET BY MOUTH AT BEDTIME TAKE WITH 10 MG TABLET = 15 MG TOTAL DOSE AT BEDTIME  
  
 trimethoprim-sulfamethoxazole 160-800 mg per tablet Commonly known as:  BACTRIM DS, SEPTRA DS  
TAKE 1 TABLET BY MOUTH TWICE A DAY  
  
 valsartan-hydroCHLOROthiazide 320-25 mg per tablet Commonly known as:  DIOVAN-HCT  
TAKE 1 TAB BY MOUTH DAILY. ziprasidone 80 mg capsule Commonly known as:  GEODON  
  
 * Notice: This list has 5 medication(s) that are the same as other medications prescribed for you. Read the directions carefully, and ask your doctor or other care provider to review them with you. Prescriptions Sent to Pharmacy Refills  
 sildenafil citrate (VIAGRA) 100 mg tablet 11 Sig: Take 1 Tab by mouth as needed. Class: Normal  
 Pharmacy: [address] [place] [address] Ph #: [phone] Route: Oral  
 tadalafil (CIALIS) 20 mg tablet 11 Sig: Take 1 Tab by mouth as needed. Class: Normal  
 Pharmacy: [address] [place] [address] Ph #: [phone] Route: Oral  
  
We Performed the Following AMB POC URINALYSIS DIP STICK AUTO W/O MICRO [06741 CPT(R)] Follow-up Instructions Return in about 6 months (around 9/28/2017) for Follow-up symptomatic BPH-erectile dysfunction. Patient Instructions Benign Prostatic Hyperplasia: Care Instructions Your Care Instructions Benign prostatic hyperplasia, or BPH, is an enlarged prostate gland. The prostate is a small gland that makes some of the fluid in semen. Prostate enlargement happens to almost all men as they age. It is usually not serious. BPH does not cause prostate cancer. As the prostate gets bigger, it may partly block the flow of urine. You may have a hard time getting a urine stream started or completely stopped. BPH can cause dribbling. You may have a weak urine stream, or you may have to urinate more often than you used to, especially at night. Most men find these problems easy to manage. You do not need treatment unless your symptoms bother you a lot or you have other problems, such as bladder infections or stones. In these cases, medicines may help. Surgery is not needed unless the urine flow is blocked or the symptoms do not get better with medicine. Follow-up care is a key part of your treatment and safety. Be sure to make and go to all appointments, and call your doctor if you are having problems. It's also a good idea to know your test results and keep a list of the medicines you take. How can you care for yourself at home? · Take plenty of time to urinate. Try to relax. · Try \"double voiding. \" Urinate as much you can, relax for a few moments, and then try to urinate again. · Sit on the toilet to urinate. · Read or think of other things while you are waiting. · Turn on a faucet, or try to picture running water. Some men find that this helps get their urine flowing. · If dribbling is a problem, wash your penis daily to avoid skin irritation and infection. · Avoid caffeine and alcohol. These drinks will increase how often you need to urinate. Spread your fluid intake throughout the day. If the urge to urinate often wakes you at night, limit your fluid intake in the evening. Urinate right before you go to bed. · Many over-the-counter cold and allergy medicines can make the symptoms of BPH worse. Avoid antihistamines, decongestants, and allergy pills, if you can. Read the warnings on the package. · If you take any prescription medicines, especially tranquilizers or antidepressants, ask your doctor or pharmacist whether they can cause urination problems. There may be other medicines you can use that do not cause urinary problems. · Be safe with medicines. Take your medicines exactly as prescribed. Call your doctor if you think you are having a problem with your medicine. When should you call for help? Call your doctor now or seek immediate medical care if: 
· You cannot urinate at all. · You have symptoms of a urinary infection. For example: ¨ You have blood or pus in your urine. ¨ You have pain in your back just below your rib cage. This is called flank pain. ¨ You have a fever, chills, or body aches. ¨ It hurts to urinate. ¨ You have groin or belly pain. Watch closely for changes in your health, and be sure to contact your doctor if: 
· It hurts when you ejaculate. · Your urinary problems get a lot worse or bother you a lot. Where can you learn more? Go to http://bailey-cristian.info/. Enter B639 in the search box to learn more about \"Benign Prostatic Hyperplasia: Care Instructions. \" Current as of: May 24, 2016 Content Version: 11.1 © 2769-4295 [employer]. Care instructions adapted under license by [employer] (which disclaims liability or warranty for this information).  If you have questions about a medical condition or this instruction, always ask your healthcare professional. [employer] disclaims any warranty or liability for your use of this information. Patient Instructions History Please provide this summary of care documentation to your next provider. Your primary care clinician is listed as RAMAKRISHNA GROSSMAN. If you have any questions after today's visit, please call [phone].

## 2017-03-28 NOTE — PROGRESS NOTES
Mr. Vera Restrepo has a reminder for a \"due or due soon\" health maintenance. I have asked that he contact his primary care provider for follow-up on this health maintenance.

## 2017-03-29 NOTE — PROGRESS NOTES
Marisol Alejo 61 y.o. male     Mr. Leslee Hernandez seen today for symptomatic BPH follow-up status post TURP July 16 relieving anatomic obstruction of the bladder outlet caused by exuberant granulation tissue overgrowth following initial TURP in May 2016-here today for follow-up erectile dysfunction    Patient is now voiding with a forceful stream and good control on alpha-blocker Flomax and 5 alpha reductase inhibitor Finasteride  -urgency and frequency treated with anticholinergic medication but patient discontinued taking Ditropan because of itching-now on 5 alpha reductase inhibitor therapy and alpha-blocker  Patient complains of easy fatigue and lack of energy as well as-patient also notes failure of ejaculation since beginning therapy with alpha-blocker Flomax     TURP pathology report  benign TURP chips-no evidence of malignancy      PVR 50 cc in August 2014      PSA 1.5 in July 2014  PSA 1.0 in December 2015  PSA 0.5 on 21 February 2017    Testosterone 452 on 21 February 2017        Review of Systems; no significant changes since 27 March 2015  CNS no seizures syncope headaches or dizziness/  Pulmonary reactive airway disease with wheezing/Emphysema  Cardiovascular hypertension no chest pain or palpitations/DVT                                                           Plavix anticoagulation discontinued in 2013  Vxvliarzcptwwgkj-RTOP-ke change in bowel habits  Genitourinary-symptomatic BPH  Musculoskeletal-chronic pain in the neck shoulders and knees  Endocrine-diabetes  Other-     Allergies: Allergies   Allergen Reactions    Seafood Hives     \"deviled-crabs\"  Does fine with all other seafood    Aspirin Hives     Other reaction(s): Swelling    Ditropan [Oxybutynin Chloride] Itching    Tomato Hives      Medications:    Current Outpatient Prescriptions   Medication Sig Dispense Refill    sildenafil citrate (VIAGRA) 100 mg tablet Take 1 Tab by mouth as needed.  6 Tab 11    tadalafil (CIALIS) 20 mg tablet Take 1 Tab by mouth as needed. 6 Tab 11    [START ON 5/19/2017] oxyCODONE-acetaminophen (PERCOCET)  mg per tablet Take 1 Tab by mouth four (4) times daily as needed for Pain for up to 30 days. Max Daily Amount: 4 Tabs. for chronic, severe, refractory pain  Indications: Pain 120 Tab 0    finasteride (PROSCAR) 5 mg tablet Take 1 Tab by mouth daily. Indications: SYMPTOMATIC BENIGN PROSTATIC HYPERPLASIA 90 Tab 3    gabapentin (NEURONTIN) 300 mg capsule Take 1 Cap by mouth three (3) times daily. For nerve pain. 1 cap qhs for 1 wk, then increase to 1 cap BID for 1 wk, then increase to 1 cap TID 90 Cap 5    valsartan-hydroCHLOROthiazide (DIOVAN-HCT) 320-25 mg per tablet TAKE 1 TAB BY MOUTH DAILY. 90 Tab 1    amoxicillin-clavulanate (AUGMENTIN) 875-125 mg per tablet   0    trifluoperazine (STELAZINE) 10 mg tablet   2    NOVOLOG MIX 70-30 FLEXPEN 100 unit/mL (70-30) inpn   3    simvastatin (ZOCOR) 20 mg tablet Take 1 Tab by mouth nightly. (Patient taking differently: Take 40 mg by mouth nightly.) 90 Tab 1    prazosin (MINIPRESS) 2 mg capsule Take 1 Cap by mouth nightly. 30 Cap 3    Insulin Lisp & Lisp Prot, Hum, (HUMALOG 75-25 MIX) 100 unit/mL (75-25) flexpen Inject beneath the skin.  ammonium lactate (LAC-HYDRIN) 12 % lotion       Azelastine (ASTEPRO) 0.15 % (205.5 mcg) nasal spray       escitalopram oxalate (LEXAPRO) 20 mg tablet       PATANOL 0.1 % ophthalmic solution       ranitidine (ZANTAC) 150 mg tablet       ziprasidone (GEODON) 80 mg capsule       fluticasone-salmeterol (ADVAIR) 250-50 mcg/dose diskus inhaler Take 1 Puff by inhalation every twelve (12) hours. Indications: INTRINSIC ASTHMA 1 Inhaler 6    OTHER  Lumbar Traction Belt 1 Device prn    amLODIPine (NORVASC) 10 mg tablet Take 1 Tab by mouth daily. 90 Tab 3    tamsulosin (FLOMAX) 0.4 mg capsule Take 1 Cap by mouth daily.  30 Cap 3    fluticasone (FLONASE) 50 mcg/actuation nasal spray Two squirts both nostrils at bedtime 1 Bottle 3    cyclobenzaprine (FLEXERIL) 5 mg tablet Take 5 mg by mouth three (3) times daily as needed for Muscle Spasm(s). Takes 1 to 2 tablets      docusate sodium (COLACE) 100 mg capsule Take 100 mg by mouth daily.  topiramate (TOPAMAX) 200 mg tablet Take  by mouth two (2) times a day.  traZODone (DESYREL) 150 mg tablet Take 150 mg by mouth nightly.  [START ON 4/21/2017] oxyCODONE-acetaminophen (PERCOCET)  mg per tablet Take 1 Tab by mouth four (4) times daily as needed for Pain for up to 30 days. Max Daily Amount: 4 Tabs. for chronic, severe, refractory pain  Indications: Pain 120 Tab 0    oxyCODONE-acetaminophen (PERCOCET)  mg per tablet Take 1 Tab by mouth four (4) times daily as needed for Pain for up to 30 days. Max Daily Amount: 4 Tabs. for chronic, severe, refractory pain  Indications: Pain 120 Tab 0    clopidogrel (PLAVIX) 75 mg tablet Take 1 Tab by mouth daily. 90 Tab 1    trimethoprim-sulfamethoxazole (BACTRIM DS, SEPTRA DS) 160-800 mg per tablet TAKE 1 TABLET BY MOUTH TWICE A DAY 20 Tab 0    ciprofloxacin HCl (CIPRO) 500 mg tablet TAKE 1 TABLET BY MOUTH TWICE A DAY FOR 5 DAYS 10 Tab 0    trifluoperazine (STELAZINE) 5 mg tablet TAKE 1 TABLET BY MOUTH AT BEDTIME TAKE WITH 10 MG TABLET = 15 MG TOTAL DOSE AT BEDTIME  2    albuterol (PROVENTIL HFA, VENTOLIN HFA, PROAIR HFA) 90 mcg/actuation inhaler 2 Puffs.       amantadine HCl (SYMMETREL) 100 mg capsule       polyethylene glycol (MIRALAX) 17 gram/dose powder          Past Medical History:   Diagnosis Date    Allergic rhinitis     Asthma     Atherosclerosis of native coronary artery with angina pectoris ([facility] 5/14/2015    atypical angina, mild stress abnormality may be artifact nl EF by echo     BPH (benign prostatic hyperplasia)     Chronic pain     shoulder, neck, knee    Coronary artery disease involving native coronary artery of native heart with angina pectoris ([facility] 5/13/2016    chr atypical CP for few seconds only     Diabetes mellitus ([facility]     DVT (deep venous thrombosis) ([facility] 2013    left leg    Emphysema     Essential hypertension     GERD (gastroesophageal reflux disease)     High cholesterol     Impotence     Morbid obesity ([facility] 4/1/2015    Other and unspecified hyperlipidemia 4/1/2015    Paranoid schizophrenia ([facility]     Preop cardiovascular exam 11/13/2015    wrist surgery; no significant CP; abnl stress ? artifact cleared with mild risk periop     Sciatica     Severe obesity (BMI >= 40) [facility] 11/13/2015    Sinusitis     Type II or unspecified type diabetes mellitus without mention of complication, not stated as uncontrolled 4/1/2015      Past Surgical History:   Procedure Laterality Date    HX HEENT  2017    sinus surgery    HX OTHER SURGICAL  2008    sinus    HX OTHER SURGICAL  2009    stress test    HX TURP  5/2016     Family History   Problem Relation Age of Onset    Stroke Mother 79    Heart Disease Mother     Diabetes Father     Cancer Brother     Heart Attack Neg Hx         Physical Examination: Well-nourished mature male in no apparent distress      Urinalysis: + Negative nitrite/ heme +    Impression: Symptomatic BPH responding favorably to TURP ×2                        Irritable bladder symptoms responded favorably to alpha-blocker and 5 alpha reductase inhibitor therapy                       -Erectile dysfunction with normal testosterone level      Plan: Levitra 20 mg #2/precautions/refill ×11            Continue Proscar 5 mg daily                      Continue Flomax 0.4 mg daily    Discussed significance of finding normal testosterone level-ED this case is most likely secondary to adverse effect of diabetes  Discussed prospect of treatment by intracavernosal injection of prostaglandin E1-also described discussed vacuum device therapy as well as implantation of penile prosthesis-patient is not interested in any of these treatment modalities    rtc 6 mo      More than 1/2 of this 25 minute visit was spent in counselling and coordination of care, as described above. Liz Asher MD  -electronically signed-    PLEASE NOTE:  This document has been produced using voice recognition software. Unrecognized errors in transcription may be present.

## 2017-04-04 ENCOUNTER — TELEPHONE (OUTPATIENT)
Dept: PAIN MANAGEMENT | Age: 57
End: 2017-04-04

## 2017-04-04 NOTE — TELEPHONE ENCOUNTER
The pt called the office to report that he had been to see his orthopedic doctor yesterday. He was given a prescription for percocet 10/325 mg #40 to take one every 6 hrs prn pain. I called and spoke to the pt. He was asking what to do with our medication. I spoke to the provider and he states that the pt will need to stop taking the percocet that he gets from our office while he takes the new prescription. The pt is being seen for a torn acl. I called the pt back and gave him the above information. He verbalized understanding and has no other questions at this time.

## 2017-05-02 ENCOUNTER — TELEPHONE (OUTPATIENT)
Dept: PAIN MANAGEMENT | Age: 57
End: 2017-05-02

## 2017-05-02 NOTE — TELEPHONE ENCOUNTER
The pt called the office again about his prescription for percocet. I called and spoke to the pt. He states that he has decided not to get this filled. He will bring in the unfilled prescription to his next appt. The pt was made aware that this will be documented for him. No questions or concerns from the pt at this time.

## 2017-06-01 ENCOUNTER — OFFICE VISIT (OUTPATIENT)
Dept: UROLOGY | Age: 57
End: 2017-06-01

## 2017-06-01 VITALS
OXYGEN SATURATION: 98 % | HEIGHT: 69 IN | BODY MASS INDEX: 41.77 KG/M2 | TEMPERATURE: 98.5 F | WEIGHT: 282 LBS | SYSTOLIC BLOOD PRESSURE: 145 MMHG | DIASTOLIC BLOOD PRESSURE: 82 MMHG | HEART RATE: 73 BPM

## 2017-06-01 DIAGNOSIS — N50.89 TESTICULAR MASS: Primary | ICD-10-CM

## 2017-06-01 LAB
BILIRUB UR QL STRIP: NEGATIVE
GLUCOSE UR-MCNC: NEGATIVE MG/DL
KETONES P FAST UR STRIP-MCNC: NEGATIVE MG/DL
PH UR STRIP: 5.5 [PH] (ref 4.6–8)
PROT UR QL STRIP: NORMAL MG/DL
SP GR UR STRIP: 1.02 (ref 1–1.03)
UA UROBILINOGEN AMB POC: NORMAL (ref 0.2–1)
URINALYSIS CLARITY POC: CLEAR
URINALYSIS COLOR POC: YELLOW
URINE BLOOD POC: NORMAL
URINE LEUKOCYTES POC: NORMAL
URINE NITRITES POC: NEGATIVE

## 2017-06-01 RX ORDER — OXYBUTYNIN CHLORIDE 5 MG/1
TABLET ORAL
Refills: 6 | COMMUNITY
Start: 2017-04-01 | End: 2017-07-03 | Stop reason: SINTOL

## 2017-06-01 RX ORDER — PRAZOSIN HYDROCHLORIDE 1 MG/1
CAPSULE ORAL
Refills: 1 | COMMUNITY
Start: 2017-05-02 | End: 2017-06-08 | Stop reason: SDUPTHER

## 2017-06-01 RX ORDER — PREDNISONE 10 MG/1
TABLET ORAL
Refills: 0 | COMMUNITY
Start: 2017-02-26 | End: 2017-06-08 | Stop reason: ALTCHOICE

## 2017-06-01 RX ORDER — TRIAZOLAM 0.12 MG/1
TABLET ORAL
Refills: 0 | COMMUNITY
Start: 2017-02-27 | End: 2017-06-08 | Stop reason: ALTCHOICE

## 2017-06-01 NOTE — PROGRESS NOTES
Shruthi Escobedo 61 y.o. male     Mr. Greyson Biggs seen today for evaluation of a mass palpated in the left scrotum several weeks ago not associated with irritative or obstructive voiding symptoms. No history of scrotal trauma, surgery, or infections patient is voiding with a forceful stream good control nocturia once per night  Patient is several months status post TURP relieving BPH induced urinary retention    symptomatic BPH follow-up status post TURP July 16 relieving anatomic obstruction of the bladder outlet caused by exuberant granulation tissue overgrowth following initial TURP in May 2016-here today for follow-up erectile dysfunction     Patient is now voiding with a forceful stream and good control on alpha-blocker Flomax and 5 alpha reductase inhibitor Finasteride  -urgency and frequency treated with anticholinergic medication but patient discontinued taking Ditropan because of itching-now on 5 alpha reductase inhibitor therapy and alpha-blocker  Patient complains of easy fatigue and lack of energy as well as-patient also notes failure of ejaculation since beginning therapy with alpha-blocker Flomax      TURP pathology report benign TURP chips-no evidence of malignancy      PVR 50 cc in August 2014      PSA 1.5 in July 2014  PSA 1.0 in December 2015  PSA 0.5 on 21 February 2017     Testosterone 452 on 21 February 2017         Review of Systems; no significant changes since 27 March 2015  CNS no seizures syncope headaches or dizziness/  Pulmonary reactive airway disease with wheezing/Emphysema  Cardiovascular hypertension no chest pain or palpitations/DVT                                                           Plavix anticoagulation discontinued in 2013  [id number] change in bowel habits  Genitourinary-symptomatic BPH  Musculoskeletal-chronic pain in the neck shoulders and knees  Endocrine-diabetes  Other:    Allergies:    Allergies   Allergen Reactions    Seafood Hives     \"deviled-crabs\"  Does fine with all other seafood    Aspirin Hives     Other reaction(s): Swelling    Ditropan [Oxybutynin Chloride] Itching    Tomato Hives      Medications:    Current Outpatient Prescriptions   Medication Sig Dispense Refill    oxybutynin (DITROPAN) 5 mg tablet TAKE ONE TABLET BY MOUTH THREE TIMES DAILY  6    prazosin (MINIPRESS) 1 mg capsule TAKE ONE (1) CAPSULE BY MOUTH AT BEDTIME  1    predniSONE (DELTASONE) 10 mg tablet TAKE 4 TABLETS BY MOUTH DAILY FOR 5 DAYS  0    triazolam (HALCION) 0.125 mg tablet TAKE 2 TABLETS BY MOUTH 2 HOURS PRIOR TO PROCEDURE. TAKE 1 TAB 30 MINS PRIOR TO PROCEDURE.  0    oxyCODONE-acetaminophen (PERCOCET)  mg per tablet Take 1 Tab by mouth four (4) times daily as needed for Pain for up to 30 days. Max Daily Amount: 4 Tabs. for chronic, severe, refractory pain  Indications: Pain 120 Tab 0    finasteride (PROSCAR) 5 mg tablet Take 1 Tab by mouth daily. Indications: SYMPTOMATIC BENIGN PROSTATIC HYPERPLASIA 90 Tab 3    valsartan-hydroCHLOROthiazide (DIOVAN-HCT) 320-25 mg per tablet TAKE 1 TAB BY MOUTH DAILY. 90 Tab 1    trifluoperazine (STELAZINE) 5 mg tablet TAKE 1 TABLET BY MOUTH AT BEDTIME TAKE WITH 10 MG TABLET = 15 MG TOTAL DOSE AT BEDTIME  2    trifluoperazine (STELAZINE) 10 mg tablet   2    NOVOLOG MIX 70-30 FLEXPEN 100 unit/mL (70-30) inpn   3    simvastatin (ZOCOR) 20 mg tablet Take 1 Tab by mouth nightly. (Patient taking differently: Take 40 mg by mouth nightly.) 90 Tab 1    prazosin (MINIPRESS) 2 mg capsule Take 1 Cap by mouth nightly. 30 Cap 3    albuterol (PROVENTIL HFA, VENTOLIN HFA, PROAIR HFA) 90 mcg/actuation inhaler 2 Puffs.  Insulin Lisp & Lisp Prot, Hum, (HUMALOG 75-25 MIX) 100 unit/mL (75-25) flexpen Inject beneath the skin.       amantadine HCl (SYMMETREL) 100 mg capsule       ammonium lactate (LAC-HYDRIN) 12 % lotion       polyethylene glycol (MIRALAX) 17 gram/dose powder       ranitidine (ZANTAC) 150 mg tablet       ziprasidone (GEODON) 80 mg capsule       fluticasone-salmeterol (ADVAIR) 250-50 mcg/dose diskus inhaler Take 1 Puff by inhalation every twelve (12) hours. Indications: INTRINSIC ASTHMA 1 Inhaler 6    OTHER  Lumbar Traction Belt 1 Device prn    amLODIPine (NORVASC) 10 mg tablet Take 1 Tab by mouth daily. 90 Tab 3    tamsulosin (FLOMAX) 0.4 mg capsule Take 1 Cap by mouth daily. 30 Cap 3    cyclobenzaprine (FLEXERIL) 5 mg tablet Take 5 mg by mouth three (3) times daily as needed for Muscle Spasm(s). Takes 1 to 2 tablets      docusate sodium (COLACE) 100 mg capsule Take 100 mg by mouth daily.  topiramate (TOPAMAX) 200 mg tablet Take  by mouth two (2) times a day.  traZODone (DESYREL) 150 mg tablet Take 150 mg by mouth nightly.  sildenafil citrate (VIAGRA) 100 mg tablet Take 1 Tab by mouth as needed. 6 Tab 11    tadalafil (CIALIS) 20 mg tablet Take 1 Tab by mouth as needed. 6 Tab 11    gabapentin (NEURONTIN) 300 mg capsule Take 1 Cap by mouth three (3) times daily. For nerve pain. 1 cap qhs for 1 wk, then increase to 1 cap BID for 1 wk, then increase to 1 cap TID 90 Cap 5    clopidogrel (PLAVIX) 75 mg tablet Take 1 Tab by mouth daily.  90 Tab 1    trimethoprim-sulfamethoxazole (BACTRIM DS, SEPTRA DS) 160-800 mg per tablet TAKE 1 TABLET BY MOUTH TWICE A DAY 20 Tab 0    ciprofloxacin HCl (CIPRO) 500 mg tablet TAKE 1 TABLET BY MOUTH TWICE A DAY FOR 5 DAYS 10 Tab 0    amoxicillin-clavulanate (AUGMENTIN) 875-125 mg per tablet   0    Azelastine (ASTEPRO) 0.15 % (205.5 mcg) nasal spray       escitalopram oxalate (LEXAPRO) 20 mg tablet       PATANOL 0.1 % ophthalmic solution       fluticasone (FLONASE) 50 mcg/actuation nasal spray Two squirts both nostrils at bedtime 1 Bottle 3       Past Medical History:   Diagnosis Date    Allergic rhinitis     Asthma     Atherosclerosis of native coronary artery with angina pectoris [facility] 5/14/2015    atypical angina, mild stress abnormality may be artifact nl EF by echo     BPH (benign prostatic hyperplasia)     Chronic pain     shoulder, neck, knee    Coronary artery disease involving native coronary artery of native heart with angina pectoris ([address] 5/13/2016    chr atypical CP for few seconds only     Diabetes mellitus ([address]     DVT (deep venous thrombosis) ([address] 2013    left leg    Emphysema     Essential hypertension     GERD (gastroesophageal reflux disease)     High cholesterol     Impotence     Morbid obesity ([address] 4/1/2015    Other and unspecified hyperlipidemia 4/1/2015    Paranoid schizophrenia ([address]     Preop cardiovascular exam 11/13/2015    wrist surgery; no significant CP; abnl stress ? artifact cleared with mild risk periop     Sciatica     Severe obesity (BMI >= 40) ([facility] 11/13/2015    Sinusitis     Type II or unspecified type diabetes mellitus without mention of complication, not stated as uncontrolled 4/1/2015      Past Surgical History:   Procedure Laterality Date    HX HEENT  2017    sinus surgery    HX OTHER SURGICAL  2008    sinus    HX OTHER SURGICAL  2009    stress test    HX TURP  5/2016     Family History   Problem Relation Age of Onset    Stroke Mother 79    Heart Disease Mother     Diabetes Father     Cancer Brother     Heart Attack Neg Hx         Physical Examination: Well-nourished mature male in no apparent distress    Penis is normal  Scrotum is normal  Multinodular enlarged head of the left epididymis which is mildly tender and mildly indurated tail of the epididymis is palpably normal--normal left testis and spermatic cord  Right testis, epididymis, and spermatic cord are palpably normal      Urinalysis: Negative dipstick/nitrite negative      Ultrasound imaging of the scrotum reveals several small spermatoceles in the head of the left epididymis-normal testis parenchymal echoes bilaterally-images have been reviewed with the patient today-patient given a laser printed copy of images showing spermatoceles    Impression: Left spermatocele                        -Symptomatic BPH responding favorably to TURP        Plan: Reassurance-no treatment is indicated none recommended    Described discussed spermatocele formation, diagnosis and management/observation is recommended with surgery removal for symptomatic spermatoceles not responding to conservative medical management    rtc 6 mo    More than 1/2 of this 25 minute visit was spent in counselling and coordination of care, as described above. Kathi Mccrary MD  -electronically signed-    PLEASE NOTE:  This document has been produced using voice recognition software. Unrecognized errors in transcription may be present.

## 2017-06-01 NOTE — MR AVS SNAPSHOT
Visit Information Date & Time Provider Department Dept. Phone Encounter #  
 6/1/2017  9:45 AM Elsy Bell, [address]  [facility] [id number] Your Appointments 6/5/2017  9:20 AM  
Follow Up with Bashir Wise PA-C [facility] (JESSICA SCHEDULING) Appt Note: return  3  
 [address] [facility] [address]  
[phone] [address]  
  
    
 6/8/2017 10:15 AM  
ESTABLISHED PATIENT with Patrick Gordon MD  
[facility]) Appt Note: 6 months post labs; 6 months post labs/mailed [address] [facility], [address] [facility] [address] [id number], [address] [place] [address]  
  
    
 9/28/2017  9:30 AM  
Office Visit with Elsy Bell MD  
[facility] [address] [facility]) Appt Note: checkup [address] [facility] [address] [facility] [address]  
[phone] [address] [place] [address] Upcoming Health Maintenance Date Due Hepatitis C Screening 1960 HEMOGLOBIN A1C Q6M 1960 FOOT EXAM Q1 3/19/1969 MICROALBUMIN Q1 3/19/1969 EYE EXAM RETINAL OR DILATED Q1 3/19/1969 Pneumococcal 19-64 Medium Risk (1 of 1 - PPSV23) 3/19/1978 DTaP/Tdap/Td series (1 - Tdap) 3/19/1980 COLONOSCOPY 3/19/2014 LIPID PANEL Q1 5/16/2017 INFLUENZA AGE 9 TO ADULT 8/1/2017 Allergies as of 6/1/2017  Review Complete On: 6/1/2017 By: Mahsa Fan LPN Severity Noted Reaction Type Reactions Seafood Medium 04/01/2015   Side Effect Hives \"deviled-crabs\"  Does fine with all other seafood Aspirin  03/05/2014    Hives Other reaction(s): Swelling Ditropan [Oxybutynin Chloride]  02/21/2017    Itching Tomato  04/28/2014    Hives Current Immunizations  Never Reviewed Name Date Influenza Vaccine 11/11/2014 Not reviewed this visit You Were Diagnosed With   
  
 Codes Comments Testicular mass    -  Primary ICD-10-CM: N50.9 ICD-9-CM: 608.89 Vitals BP Pulse Temp Height(growth percentile) Weight(growth percentile) SpO2  
 145/82 (BP 1 Location: Left arm, BP Patient Position: Sitting) 73 98.5 °F (36.9 °C) 5' 9\" (1.753 m) 282 lb (127.9 kg) 98% BMI Smoking Status 41.64 kg/m2 Current Some Day Smoker Vitals History BMI and BSA Data Body Mass Index Body Surface Area  
 41.64 kg/m 2 2.5 m 2 Preferred Pharmacy Pharmacy Name Phone [facility] [address] [facility] [phone] Your Updated Medication List  
  
   
This list is accurate as of: 6/1/17 12:04 PM.  Always use your most recent med list.  
  
  
  
  
 albuterol 90 mcg/actuation inhaler Commonly known as:  PROVENTIL HFA, VENTOLIN HFA, PROAIR HFA  
2 Puffs. amantadine HCl 100 mg capsule Commonly known as:  SYMMETREL  
  
 amLODIPine 10 mg tablet Commonly known as:  Borrego Brendon Take 1 Tab by mouth daily. ammonium lactate 12 % lotion Commonly known as:  LAC-HYDRIN  
  
 amoxicillin-clavulanate 875-125 mg per tablet Commonly known as:  AUGMENTIN Azelastine 0.15 % (205.5 mcg) nasal spray Commonly known as:  ASTEPRO  
  
 ciprofloxacin HCl 500 mg tablet Commonly known as:  CIPRO TAKE 1 TABLET BY MOUTH TWICE A DAY FOR 5 DAYS  
  
 clopidogrel 75 mg Tab Commonly known as:  PLAVIX Take 1 Tab by mouth daily. cyclobenzaprine 5 mg tablet Commonly known as:  FLEXERIL Take 5 mg by mouth three (3) times daily as needed for Muscle Spasm(s). Takes 1 to 2 tablets  
  
 docusate sodium 100 mg capsule Commonly known as:  Santos Patrick Take 100 mg by mouth daily. escitalopram oxalate 20 mg tablet Commonly known as:  LEXAPRO  
  
 finasteride 5 mg tablet Commonly known as:  PROSCAR  
 Take 1 Tab by mouth daily. Indications: SYMPTOMATIC BENIGN PROSTATIC HYPERPLASIA  
  
 fluticasone 50 mcg/actuation nasal spray Commonly known as:  Devika Chaseck Two squirts both nostrils at bedtime  
  
 fluticasone-salmeterol 250-50 mcg/dose diskus inhaler Commonly known as:  ADVAIR Take 1 Puff by inhalation every twelve (12) hours. Indications: INTRINSIC ASTHMA  
  
 gabapentin 300 mg capsule Commonly known as:  NEURONTIN Take 1 Cap by mouth three (3) times daily. For nerve pain. 1 cap qhs for 1 wk, then increase to 1 cap BID for 1 wk, then increase to 1 cap TID Insulin Lisp & Lisp Prot (Hum) 100 unit/mL (75-25) Inpn Commonly known as:  HUMALOG 75-25 MIX Inject beneath the skin. NovoLOG Mix 70-30 FlexPen 100 unit/mL (70-30) Inpn Generic drug:  insulin aspart protamine/insulin aspart OTHER  
 Lumbar Traction Belt  
  
 oxybutynin 5 mg tablet Commonly known as:  DITROPAN  
TAKE ONE TABLET BY MOUTH THREE TIMES DAILY  
  
 oxyCODONE-acetaminophen  mg per tablet Commonly known as:  PERCOCET Take 1 Tab by mouth four (4) times daily as needed for Pain for up to 30 days. Max Daily Amount: 4 Tabs. for chronic, severe, refractory pain  Indications: Pain PATANOL 0.1 % ophthalmic solution Generic drug:  olopatadine  
  
 polyethylene glycol 17 gram/dose powder Commonly known as:  Emily Lopez * prazosin 2 mg capsule Commonly known as:  MINIPRESS Take 1 Cap by mouth nightly. * prazosin 1 mg capsule Commonly known as:  MINIPRESS  
TAKE ONE (1) CAPSULE BY MOUTH AT BEDTIME  
  
 predniSONE 10 mg tablet Commonly known as:  DELTASONE  
TAKE 4 TABLETS BY MOUTH DAILY FOR 5 DAYS  
  
 raNITIdine 150 mg tablet Commonly known as:  ZANTAC  
  
 sildenafil citrate 100 mg tablet Commonly known as:  VIAGRA Take 1 Tab by mouth as needed. simvastatin 20 mg tablet Commonly known as:  ZOCOR Take 1 Tab by mouth nightly. tadalafil 20 mg tablet Commonly known as:  CIALIS Take 1 Tab by mouth as needed. tamsulosin 0.4 mg capsule Commonly known as:  FLOMAX Take 1 Cap by mouth daily. topiramate 200 mg tablet Commonly known as:  TOPAMAX Take  by mouth two (2) times a day. traZODone 150 mg tablet Commonly known as:  Auther Sax Take 150 mg by mouth nightly. triazolam 0.125 mg tablet Commonly known as:  HALCION  
TAKE 2 TABLETS BY MOUTH 2 HOURS PRIOR TO PROCEDURE. TAKE 1 TAB 30 MINS PRIOR TO PROCEDURE. * trifluoperazine 10 mg tablet Commonly known as:  STELAZINE  
  
 * trifluoperazine 5 mg tablet Commonly known as:  STELAZINE  
TAKE 1 TABLET BY MOUTH AT BEDTIME TAKE WITH 10 MG TABLET = 15 MG TOTAL DOSE AT BEDTIME  
  
 trimethoprim-sulfamethoxazole 160-800 mg per tablet Commonly known as:  BACTRIM DS, SEPTRA DS  
TAKE 1 TABLET BY MOUTH TWICE A DAY  
  
 valsartan-hydroCHLOROthiazide 320-25 mg per tablet Commonly known as:  DIOVAN-HCT  
TAKE 1 TAB BY MOUTH DAILY. ziprasidone 80 mg capsule Commonly known as:  GEODON  
  
 * Notice: This list has 4 medication(s) that are the same as other medications prescribed for you. Read the directions carefully, and ask your doctor or other care provider to review them with you. We Performed the Following AMB POC URINALYSIS DIP STICK AUTO W/O MICRO [54899 CPT(R)] Patient Instructions Testicular Mass: Care Instructions Your Care Instructions A lump or mass in your testicle may be a minor problem, or it may be more serious. Minor causes include a buildup of fluid, a cyst, or a varicose vein in your scrotum. More serious causes include infection or a tumor. You may have an ultrasound, an X-ray, or a CT scan to find out what is causing the mass. Or you may have a blood test. 
Follow-up care is a key part of your treatment and safety.  Be sure to make and go to all appointments, and call your doctor if you are having problems. It's also a good idea to know your test results and keep a list of the medicines you take. How can you care for yourself at home? · Take your medicine exactly as prescribed. If your doctor prescribed antibiotics, take them as directed. Do not stop taking them just because you feel better. You need to take the full course of antibiotics. · Be safe with medicines. Take pain medicines exactly as directed. ¨ If the doctor gave you a prescription medicine for pain, take it as prescribed. ¨ If you are not taking a prescription pain medicine, take an over-the-counter medicine such as acetaminophen (Tylenol), ibuprofen (Advil, Motrin), or naproxen (Aleve). Read and follow all instructions on the label. ¨ Do not take two or more pain medicines at the same time unless the doctor told you to. Many pain medicines have acetaminophen, which is Tylenol. Too much acetaminophen (Tylenol) can be harmful. · Learn how to check your testicles so you can see if the lump changes. When should you call for help? Call your doctor now or seek immediate medical care if: 
· You have severe pain in a testicle. It could be a sign of a twisted testicle. This is an emergency. · You have new or worsening pain in a testicle. Watch closely for changes in your health, and be sure to contact your doctor if: 
· You have a new or growing mass in a testicle. · You see a change in a testicle. · You are not getting better as expected. Where can you learn more? Go to http://bailey-cristian.info/. Enter O408 in the search box to learn more about \"Testicular Mass: Care Instructions. \" Current as of: August 12, 2016 Content Version: 11.2 © 3201-8719 [employer]. Care instructions adapted under license by [employer] (which disclaims liability or warranty for this information).  If you have questions about a medical condition or this instruction, always ask your healthcare professional. [facility] any warranty or liability for your use of this information. Introducing [facility]! Avis Zavaleta introduces [employer] patient portal. Now you can access parts of your medical record, email your doctor's office, and request medication refills online. 1. In your internet browser, go to [email or web address] 2. Click on the First Time User? Click Here link in the Sign In box. You will see the New Member Sign Up page. 3. Enter your [employer] Access Code exactly as it appears below. You will not need to use this code after youve completed the sign-up process. If you do not sign up before the expiration date, you must request a new code. · [employer] Access Code: [email or web address] Expires: 8/30/2017 10:02 AM 
 
4. Enter the last four digits of your Social Security Number (xxxx) and Date of Birth (mm/dd/yyyy) as indicated and click Submit. You will be taken to the next sign-up page. 5. Create a [employer] ID. This will be your [employer] login ID and cannot be changed, so think of one that is secure and easy to remember. 6. Create a [employer] password. You can change your password at any time. 7. Enter your Password Reset Question and Answer. This can be used at a later time if you forget your password. 8. Enter your e-mail address. You will receive e-mail notification when new information is available in [address] 9. Click Sign Up. You can now view and download portions of your medical record. 10. Click the Download Summary menu link to download a portable copy of your medical information. If you have questions, please visit the Frequently Asked Questions section of the [employer] website. Remember, [employer] is NOT to be used for urgent needs. For medical emergencies, dial 911. Now available from your iPhone and Android! Please provide this summary of care documentation to your next provider. Your primary care clinician is listed as RAMAKRISHNA GROSSMAN. If you have any questions after today's visit, please call [phone].

## 2017-06-01 NOTE — PROGRESS NOTES
Mr. Arnold Walker has a reminder for a \"due or due soon\" health maintenance. I have asked that he contact his primary care provider for follow-up on this health maintenance.

## 2017-06-01 NOTE — PATIENT INSTRUCTIONS
Testicular Mass: Care Instructions  Your Care Instructions  A lump or mass in your testicle may be a minor problem, or it may be more serious. Minor causes include a buildup of fluid, a cyst, or a varicose vein in your scrotum. More serious causes include infection or a tumor. You may have an ultrasound, an X-ray, or a CT scan to find out what is causing the mass. Or you may have a blood test.  Follow-up care is a key part of your treatment and safety. Be sure to make and go to all appointments, and call your doctor if you are having problems. It's also a good idea to know your test results and keep a list of the medicines you take. How can you care for yourself at home? · Take your medicine exactly as prescribed. If your doctor prescribed antibiotics, take them as directed. Do not stop taking them just because you feel better. You need to take the full course of antibiotics. · Be safe with medicines. Take pain medicines exactly as directed. ¨ If the doctor gave you a prescription medicine for pain, take it as prescribed. ¨ If you are not taking a prescription pain medicine, take an over-the-counter medicine such as acetaminophen (Tylenol), ibuprofen (Advil, Motrin), or naproxen (Aleve). Read and follow all instructions on the label. ¨ Do not take two or more pain medicines at the same time unless the doctor told you to. Many pain medicines have acetaminophen, which is Tylenol. Too much acetaminophen (Tylenol) can be harmful. · Learn how to check your testicles so you can see if the lump changes. When should you call for help? Call your doctor now or seek immediate medical care if:  · You have severe pain in a testicle. It could be a sign of a twisted testicle. This is an emergency. · You have new or worsening pain in a testicle. Watch closely for changes in your health, and be sure to contact your doctor if:  · You have a new or growing mass in a testicle. · You see a change in a testicle.   · You are not getting better as expected. Where can you learn more? Go to http://bailey-cristian.info/. Enter S194 in the search box to learn more about \"Testicular Mass: Care Instructions. \"  Current as of: August 12, 2016  Content Version: 11.2  © 3830-2284 [employer], [employer]. Care instructions adapted under license by [employer] (which disclaims liability or warranty for this information). If you have questions about a medical condition or this instruction, always ask your healthcare professional. [address] any warranty or liability for your use of this information.

## 2017-06-05 ENCOUNTER — OFFICE VISIT (OUTPATIENT)
Dept: PAIN MANAGEMENT | Age: 57
End: 2017-06-05

## 2017-06-05 VITALS
DIASTOLIC BLOOD PRESSURE: 83 MMHG | SYSTOLIC BLOOD PRESSURE: 144 MMHG | HEIGHT: 69 IN | WEIGHT: 282 LBS | BODY MASS INDEX: 41.77 KG/M2 | HEART RATE: 86 BPM

## 2017-06-05 DIAGNOSIS — E66.01 SEVERE OBESITY (BMI >= 40) (HCC): ICD-10-CM

## 2017-06-05 DIAGNOSIS — M15.9 PRIMARY OSTEOARTHRITIS INVOLVING MULTIPLE JOINTS: ICD-10-CM

## 2017-06-05 DIAGNOSIS — G56.02 CARPAL TUNNEL SYNDROME OF LEFT WRIST: ICD-10-CM

## 2017-06-05 DIAGNOSIS — N45.1 CHRONIC EPIDIDYMITIS: ICD-10-CM

## 2017-06-05 DIAGNOSIS — Z79.899 ENCOUNTER FOR LONG-TERM (CURRENT) DRUG USE: ICD-10-CM

## 2017-06-05 DIAGNOSIS — M25.512 PAIN IN JOINT OF LEFT SHOULDER: ICD-10-CM

## 2017-06-05 DIAGNOSIS — M54.2 CERVICALGIA: ICD-10-CM

## 2017-06-05 DIAGNOSIS — F45.42 PAIN DISORDER WITH PSYCHOLOGICAL COMPONENT: ICD-10-CM

## 2017-06-05 DIAGNOSIS — M77.8 ENTHESOPATHY OF WRIST: ICD-10-CM

## 2017-06-05 DIAGNOSIS — M54.16 LUMBAR NEURITIS: ICD-10-CM

## 2017-06-05 DIAGNOSIS — S83.411D TEAR OF MCL (MEDIAL COLLATERAL LIGAMENT) OF KNEE, RIGHT, SUBSEQUENT ENCOUNTER: Primary | ICD-10-CM

## 2017-06-05 DIAGNOSIS — M54.12 CERVICAL RADICULOPATHY: ICD-10-CM

## 2017-06-05 DIAGNOSIS — M51.37 DEGENERATION OF LUMBAR OR LUMBOSACRAL INTERVERTEBRAL DISC: ICD-10-CM

## 2017-06-05 DIAGNOSIS — M54.42 MIDLINE LOW BACK PAIN WITH LEFT-SIDED SCIATICA, UNSPECIFIED CHRONICITY: ICD-10-CM

## 2017-06-05 PROBLEM — S83.419A TEAR OF MCL (MEDIAL COLLATERAL LIGAMENT) OF KNEE: Status: ACTIVE | Noted: 2017-06-05

## 2017-06-05 RX ORDER — DICLOFENAC SODIUM 10 MG/G
2 GEL TOPICAL 4 TIMES DAILY
Qty: 300 G | Refills: 5 | Status: SHIPPED | OUTPATIENT
Start: 2017-06-05 | End: 2018-05-07

## 2017-06-05 RX ORDER — OXYCODONE AND ACETAMINOPHEN 10; 325 MG/1; MG/1
1 TABLET ORAL
Qty: 120 TAB | Refills: 0 | Status: SHIPPED | OUTPATIENT
Start: 2017-07-18 | End: 2017-06-05 | Stop reason: SDUPTHER

## 2017-06-05 RX ORDER — OXYCODONE AND ACETAMINOPHEN 10; 325 MG/1; MG/1
1 TABLET ORAL
Qty: 120 TAB | Refills: 0 | Status: SHIPPED | OUTPATIENT
Start: 2017-08-16 | End: 2017-09-11 | Stop reason: SDUPTHER

## 2017-06-05 RX ORDER — OXYCODONE AND ACETAMINOPHEN 10; 325 MG/1; MG/1
1 TABLET ORAL
Qty: 120 TAB | Refills: 0 | Status: SHIPPED | OUTPATIENT
Start: 2017-06-19 | End: 2017-06-05 | Stop reason: SDUPTHER

## 2017-06-05 RX ORDER — GABAPENTIN 300 MG/1
300 CAPSULE ORAL 3 TIMES DAILY
Qty: 90 CAP | Refills: 5 | Status: SHIPPED | OUTPATIENT
Start: 2017-06-05 | End: 2017-09-11 | Stop reason: SDUPTHER

## 2017-06-05 NOTE — MR AVS SNAPSHOT
Visit Information Date & Time Provider Department Dept. Phone Encounter #  
 6/5/2017  9:20 AM Janith Councilman [address] for Pain Management [phone] Follow-up Instructions Return in about 3 months (around 9/5/2017). Follow-up and Disposition History Your Appointments 6/8/2017 10:15 AM  
ESTABLISHED PATIENT with Gosia Hakns MD  
[facility]) Appt Note: 6 months post labs; 6 months post labs/mailed [address] [facility], [address] [facility] [address]  
[address], [address] [place] [address]  
  
    
 9/28/2017  9:30 AM  
Office Visit with Alberto Loyola MD  
[facility]) Appt Note: checkup [address] [facility] A [address] [address]  
[phone] [address] [place] [address] Upcoming Health Maintenance Date Due Hepatitis C Screening 1960 HEMOGLOBIN A1C Q6M 1960 FOOT EXAM Q1 3/19/1969 MICROALBUMIN Q1 3/19/1969 EYE EXAM RETINAL OR DILATED Q1 3/19/1969 Pneumococcal 19-64 Medium Risk (1 of 1 - PPSV23) 3/19/1978 DTaP/Tdap/Td series (1 - Tdap) 3/19/1980 COLONOSCOPY 3/19/2014 LIPID PANEL Q1 5/16/2017 INFLUENZA AGE 9 TO ADULT 8/1/2017 Allergies as of 6/5/2017  Review Complete On: 6/5/2017 By: Hailey Gonzalez Severity Noted Reaction Type Reactions Seafood Medium 04/01/2015   Side Effect Hives \"deviled-crabs\"  Does fine with all other seafood Aspirin  03/05/2014    Hives Other reaction(s): Swelling Ditropan [Oxybutynin Chloride]  02/21/2017    Itching Tomato  04/28/2014    Hives Current Immunizations  Never Reviewed Name Date Influenza Vaccine 11/11/2014 Not reviewed this visit You Were Diagnosed With   
  
 Codes Comments  Tear of MCL (medial collateral ligament) of knee, right, subsequent encounter    -  Primary ICD-10-CM: X87.326M ICD-9-CM: V58.89, 844.1 Carpal tunnel syndrome of left wrist     ICD-10-CM: G56.02 
ICD-9-CM: 354.0 Cervical radiculopathy     ICD-10-CM: M54.12 
ICD-9-CM: 723.4 Lumbar neuritis     ICD-10-CM: M54.16 
ICD-9-CM: 724.4 Chronic epididymitis     ICD-10-CM: N45.1 ICD-9-CM: 604.90 Severe obesity (BMI >= 40) ([facility])     ICD-10-CM: E66.01 
ICD-9-CM: 278.01 Pain disorder with psychological component     ICD-10-CM: F45.41 
ICD-9-CM: 307.80 Pain in joint of left shoulder     ICD-10-CM: M25.512 ICD-9-CM: 719.41 Encounter for long-term (current) drug use     ICD-10-CM: Z79.899 ICD-9-CM: V58.69 Enthesopathy of wrist     ICD-10-CM: M77.8 ICD-9-CM: 726.4 Midline low back pain with left-sided sciatica, unspecified chronicity     ICD-10-CM: M54.42 
ICD-9-CM: 724.3 Degeneration of lumbar or lumbosacral intervertebral disc     ICD-10-CM: M51.37 
ICD-9-CM: 722.52 Cervicalgia     ICD-10-CM: M54.2 ICD-9-CM: 723.1 Primary osteoarthritis involving multiple joints     ICD-10-CM: M15.0 ICD-9-CM: 715.09 Vitals BP Pulse Height(growth percentile) Weight(growth percentile) BMI Smoking Status 144/83 86 5' 9\" (1.753 m) 282 lb (127.9 kg) 41.64 kg/m2 Current Some Day Smoker BMI and BSA Data Body Mass Index Body Surface Area  
 41.64 kg/m 2 2.5 m 2 Preferred Pharmacy Pharmacy Name Phone [facility] [address] [place] [address] [phone] Your Updated Medication List  
  
   
This list is accurate as of: 6/5/17 10:33 AM.  Always use your most recent med list.  
  
  
  
  
 albuterol 90 mcg/actuation inhaler Commonly known as:  PROVENTIL HFA, VENTOLIN HFA, PROAIR HFA  
2 Puffs. amantadine HCl 100 mg capsule Commonly known as:  SYMMETREL  
  
 amLODIPine 10 mg tablet Commonly known as:  Priyanka Baires Take 1 Tab by mouth daily. ammonium lactate 12 % lotion Commonly known as:  LAC-HYDRIN  
  
 amoxicillin-clavulanate 875-125 mg per tablet Commonly known as:  AUGMENTIN Azelastine 0.15 % (205.5 mcg) nasal spray Commonly known as:  ASTEPRO  
  
 ciprofloxacin HCl 500 mg tablet Commonly known as:  CIPRO TAKE 1 TABLET BY MOUTH TWICE A DAY FOR 5 DAYS  
  
 clopidogrel 75 mg Tab Commonly known as:  PLAVIX Take 1 Tab by mouth daily. cyclobenzaprine 5 mg tablet Commonly known as:  FLEXERIL Take 5 mg by mouth three (3) times daily as needed for Muscle Spasm(s). Takes 1 to 2 tablets  
  
 diclofenac 1 % Gel Commonly known as:  VOLTAREN Apply 2 g to affected area four (4) times daily. Apply to painful knees, shoulder, and wrist as needed  
  
 docusate sodium 100 mg capsule Commonly known as:  Gemma Batters Take 100 mg by mouth daily. escitalopram oxalate 20 mg tablet Commonly known as:  LEXAPRO  
  
 finasteride 5 mg tablet Commonly known as:  PROSCAR Take 1 Tab by mouth daily. Indications: SYMPTOMATIC BENIGN PROSTATIC HYPERPLASIA  
  
 fluticasone 50 mcg/actuation nasal spray Commonly known as:  Sosa Frame Two squirts both nostrils at bedtime  
  
 fluticasone-salmeterol 250-50 mcg/dose diskus inhaler Commonly known as:  ADVAIR Take 1 Puff by inhalation every twelve (12) hours. Indications: INTRINSIC ASTHMA  
  
 gabapentin 300 mg capsule Commonly known as:  NEURONTIN Take 1 Cap by mouth three (3) times daily. For nerve pain. 1 cap qhs for 1 wk, then increase to 1 cap BID for 1 wk, then increase to 1 cap TID Insulin Lisp & Lisp Prot (Hum) 100 unit/mL (75-25) Inpn Commonly known as:  HUMALOG 75-25 MIX Inject beneath the skin. NovoLOG Mix 70-30 FlexPen 100 unit/mL (70-30) Inpn Generic drug:  insulin aspart protamine/insulin aspart OTHER  
 Lumbar Traction Belt  
  
 oxybutynin 5 mg tablet Commonly known as:  DITROPAN  
TAKE ONE TABLET BY MOUTH THREE TIMES DAILY oxyCODONE-acetaminophen  mg per tablet Commonly known as:  PERCOCET Take 1 Tab by mouth four (4) times daily as needed for Pain for up to 30 days. Max Daily Amount: 4 Tabs. for chronic, severe, refractory pain  Indications: Pain Start taking on:  8/16/2017 PATANOL 0.1 % ophthalmic solution Generic drug:  olopatadine  
  
 polyethylene glycol 17 gram/dose powder Commonly known as:  Bonnita Amass * prazosin 2 mg capsule Commonly known as:  MINIPRESS Take 1 Cap by mouth nightly. * prazosin 1 mg capsule Commonly known as:  MINIPRESS  
TAKE ONE (1) CAPSULE BY MOUTH AT BEDTIME  
  
 predniSONE 10 mg tablet Commonly known as:  DELTASONE  
TAKE 4 TABLETS BY MOUTH DAILY FOR 5 DAYS  
  
 raNITIdine 150 mg tablet Commonly known as:  ZANTAC  
  
 sildenafil citrate 100 mg tablet Commonly known as:  VIAGRA Take 1 Tab by mouth as needed. simvastatin 20 mg tablet Commonly known as:  ZOCOR Take 1 Tab by mouth nightly. tadalafil 20 mg tablet Commonly known as:  CIALIS Take 1 Tab by mouth as needed. tamsulosin 0.4 mg capsule Commonly known as:  FLOMAX Take 1 Cap by mouth daily. topiramate 200 mg tablet Commonly known as:  TOPAMAX Take  by mouth two (2) times a day. traZODone 150 mg tablet Commonly known as:  Devonte North Take 150 mg by mouth nightly. triazolam 0.125 mg tablet Commonly known as:  HALCION  
TAKE 2 TABLETS BY MOUTH 2 HOURS PRIOR TO PROCEDURE. TAKE 1 TAB 30 MINS PRIOR TO PROCEDURE. * trifluoperazine 10 mg tablet Commonly known as:  STELAZINE  
  
 * trifluoperazine 5 mg tablet Commonly known as:  STELAZINE  
TAKE 1 TABLET BY MOUTH AT BEDTIME TAKE WITH 10 MG TABLET = 15 MG TOTAL DOSE AT BEDTIME  
  
 trimethoprim-sulfamethoxazole 160-800 mg per tablet Commonly known as:  BACTRIM DS, SEPTRA DS  
TAKE 1 TABLET BY MOUTH TWICE A DAY  
  
 valsartan-hydroCHLOROthiazide 320-25 mg per tablet Commonly known as:  DIOVAN-HCT  
TAKE 1 TAB BY MOUTH DAILY. ziprasidone 80 mg capsule Commonly known as:  GEODON  
  
 * Notice: This list has 4 medication(s) that are the same as other medications prescribed for you. Read the directions carefully, and ask your doctor or other care provider to review them with you. Prescriptions Printed Refills  
 gabapentin (NEURONTIN) 300 mg capsule 5 Sig: Take 1 Cap by mouth three (3) times daily. For nerve pain. 1 cap qhs for 1 wk, then increase to 1 cap BID for 1 wk, then increase to 1 cap TID Class: Print Route: Oral  
 oxyCODONE-acetaminophen (PERCOCET)  mg per tablet 0 Starting on: 8/16/2017 Sig: Take 1 Tab by mouth four (4) times daily as needed for Pain for up to 30 days. Max Daily Amount: 4 Tabs. for chronic, severe, refractory pain  Indications: Pain Class: Print Route: Oral  
  
Prescriptions Sent to Pharmacy Refills  
 diclofenac (VOLTAREN) 1 % gel 5 Sig: Apply 2 g to affected area four (4) times daily. Apply to painful knees, shoulder, and wrist as needed Class: Normal  
 Pharmacy: [address] [place] [address] #: [phone] Route: Topical  
  
Follow-up Instructions Return in about 3 months (around 9/5/2017). Patient Instructions Plan: 
Continue same medications as prescribed for chronic pain Follow up in 3 months or sooner if needed Keep up with your psychiatrist and pay close attention to your emotional well-being. Ruminating on negative events in your life, as well as your pain, can lead to worsening pain! Regular exercise and attention to emotional health and diet remain the most effective ways to treat chronic pain of all kinds You may contact me with questions or concerns through [address] Introducing [facility]!    
 Toribio Nyhan introduces [employer] patient portal. Now you can access parts of your medical record, email your doctor's office, and request medication refills online. 1. In your internet browser, go to [email or web address] 2. Click on the First Time User? Click Here link in the Sign In box. You will see the New Member Sign Up page. 3. Enter your [email or web address] Access Code exactly as it appears below. You will not need to use this code after youve completed the sign-up process. If you do not sign up before the expiration date, you must request a new code. · [email or web address] Access Code: [email or web address] Expires: 8/30/2017 10:02 AM 
 
4. Enter the last four digits of your Social Security Number (xxxx) and Date of Birth (mm/dd/yyyy) as indicated and click Submit. You will be taken to the next sign-up page. 5. Create a [email or web address] ID. This will be your [email or web address] login ID and cannot be changed, so think of one that is secure and easy to remember. 6. Create a [email or web address] password. You can change your password at any time. 7. Enter your Password Reset Question and Answer. This can be used at a later time if you forget your password. 8. Enter your e-mail address. You will receive e-mail notification when new information is available in [address] 9. Click Sign Up. You can now view and download portions of your medical record. 10. Click the Download Summary menu link to download a portable copy of your medical information. If you have questions, please visit the Frequently Asked Questions section of the [email or web address] website. Remember, [email or web address] is NOT to be used for urgent needs. For medical emergencies, dial 911. Now available from your iPhone and Android! Please provide this summary of care documentation to your next provider. Your primary care clinician is listed as RAMAKRISHNA GROSSMAN. If you have any questions after today's visit, please call [phone].

## 2017-06-05 NOTE — PATIENT INSTRUCTIONS
Plan:  Continue same medications as prescribed for chronic pain  Follow up in 3 months or sooner if needed  Keep up with your psychiatrist and pay close attention to your emotional well-being. Ruminating on negative events in your life, as well as your pain, can lead to worsening pain!   Regular exercise and attention to emotional health and diet remain the most effective ways to treat chronic pain of all kinds  You may contact me with questions or concerns through [address]

## 2017-06-05 NOTE — PROGRESS NOTES
Nursing Notes    Patient presents to the office today in follow-up. Patient rates his pain at 8/10 on the numerical pain scale. Reviewed medications with counts as follows:    Rx Date filled Qty Dispensed Pill Count Last Dose Short   Percocet 10-325mg 05/21/17 120 66 today no                       Comments: Pt brought in perscription for percocet 5-325mg back to office given from orthopic. POC UDS was not performed in office today    Any new labs or imaging since last appointment? YES, MRI on right knee    Have you been to an emergency room (ER) or urgent care clinic since your last visit? NO            Have you been hospitalized since your last visit? NO     If yes, where, when, and reason for visit? Have you seen or consulted any other health care providers outside of the [address] [place] [address]  since your last visit? YES     If yes, where, when, and reason for visit? HM deferred to pcp.
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
## 2017-06-08 ENCOUNTER — OFFICE VISIT (OUTPATIENT)
Dept: CARDIOLOGY CLINIC | Age: 57
End: 2017-06-08

## 2017-06-08 VITALS
DIASTOLIC BLOOD PRESSURE: 88 MMHG | HEIGHT: 69 IN | WEIGHT: 279 LBS | HEART RATE: 86 BPM | SYSTOLIC BLOOD PRESSURE: 146 MMHG | BODY MASS INDEX: 41.32 KG/M2

## 2017-06-08 DIAGNOSIS — I10 HTN (HYPERTENSION), BENIGN: ICD-10-CM

## 2017-06-08 DIAGNOSIS — E66.01 SEVERE OBESITY (BMI >= 40) (HCC): ICD-10-CM

## 2017-06-08 DIAGNOSIS — E78.2 MIXED HYPERLIPIDEMIA: ICD-10-CM

## 2017-06-08 DIAGNOSIS — I25.119 ATHEROSCLEROSIS OF NATIVE CORONARY ARTERY OF NATIVE HEART WITH ANGINA PECTORIS (HCC): Primary | ICD-10-CM

## 2017-06-08 DIAGNOSIS — F17.200 TOBACCO DEPENDENCE: ICD-10-CM

## 2017-06-08 RX ORDER — AMLODIPINE BESYLATE 10 MG/1
10 TABLET ORAL DAILY
Qty: 90 TAB | Refills: 3 | Status: SHIPPED | OUTPATIENT
Start: 2017-06-08 | End: 2017-07-13

## 2017-06-08 RX ORDER — PRAZOSIN HYDROCHLORIDE 5 MG/1
5 CAPSULE ORAL
Qty: 90 CAP | Refills: 2 | Status: SHIPPED | OUTPATIENT
Start: 2017-06-08 | End: 2017-09-22 | Stop reason: SDUPTHER

## 2017-06-08 RX ORDER — SIMVASTATIN 20 MG/1
20 TABLET, FILM COATED ORAL
Qty: 90 TAB | Refills: 3 | Status: SHIPPED | OUTPATIENT
Start: 2017-06-08 | End: 2017-08-29 | Stop reason: SDUPTHER

## 2017-06-08 RX ORDER — VALSARTAN AND HYDROCHLOROTHIAZIDE 320; 25 MG/1; MG/1
1 TABLET, FILM COATED ORAL DAILY
Qty: 90 TAB | Refills: 1 | Status: SHIPPED | OUTPATIENT
Start: 2017-06-08 | End: 2017-07-13

## 2017-06-08 NOTE — PROGRESS NOTES
Patient didn't bring medications, verbally reviewed    1. Have you been to the ER, urgent care clinic since your last visit? Hospitalized since your last visit? No    2. Have you seen or consulted any other health care providers outside of the [facility] since your last visit? Include any pap smears or colon screening. Yes Where: PCP, Urology, Orthopeadic/ Routine     3. Since your last visit, have you had any of the following symptoms?      palpitations. 4.  Have you had any blood work, X-rays or cardiac testing? Yes Where: [facility]     Requested: NO     In [facility]: YES    5. Where do you normally have your labs drawn? [facility]    6. Do you need any refills today?    yes

## 2017-06-08 NOTE — PROGRESS NOTES
HISTORY OF PRESENT ILLNESS  Gio Montesinos is a 61 y.o. male. Hypertension   The history is provided by the patient and medical records. This is a chronic problem. Pertinent negatives include no chest pain, no headaches and no shortness of breath. Cholesterol Problem   The history is provided by the medical records. This is a chronic problem. Pertinent negatives include no chest pain, no headaches and no shortness of breath. Dizziness   The history is provided by the patient. This is a new problem. The current episode started more than 1 week ago. The problem occurs rarely. The problem has been resolved. Pertinent negatives include no chest pain, no headaches and no shortness of breath. The symptoms are aggravated by bending and standing. The symptoms are relieved by rest.   Leg Swelling   The history is provided by the patient. This is a new problem. The current episode started more than 1 week ago (9/16). The problem occurs every several days. The problem has not changed since onset. Pertinent negatives include no chest pain, no headaches and no shortness of breath. The symptoms are aggravated by standing. The symptoms are relieved by sleep. Review of Systems   Constitutional: Negative for chills, diaphoresis, fever, malaise/fatigue and weight loss. HENT: Negative for nosebleeds. Eyes: Negative for discharge. Respiratory: Negative for cough, shortness of breath and wheezing. Cardiovascular: Positive for palpitations and leg swelling. Negative for chest pain, orthopnea, claudication and PND. Gastrointestinal: Negative for diarrhea, nausea and vomiting. Genitourinary: Negative for dysuria and hematuria. Musculoskeletal: Negative for joint pain. Skin: Negative for rash. Neurological: Positive for dizziness. Negative for seizures, loss of consciousness and headaches. Endo/Heme/Allergies: Negative for polydipsia. Does not bruise/bleed easily.    Psychiatric/Behavioral: Negative for depression and substance abuse. The patient does not have insomnia. Allergies   Allergen Reactions    Seafood Hives     \"deviled-crabs\"  Does fine with all other seafood    Aspirin Hives     Other reaction(s): Swelling    Ditropan [Oxybutynin Chloride] Itching    Tomato Hives       Past Medical History:   Diagnosis Date    Allergic rhinitis     Asthma     Atherosclerosis of native coronary artery with angina pectoris ([address] 5/14/2015    atypical angina, mild stress abnormality may be artifact nl EF by echo     BPH (benign prostatic hyperplasia)     Chronic pain     shoulder, neck, knee    Coronary artery disease involving native coronary artery of native heart with angina pectoris ([address] 5/13/2016    chr atypical CP for few seconds only     Diabetes mellitus ([address]     DVT (deep venous thrombosis) ([address] 2013    left leg    Emphysema     Essential hypertension     GERD (gastroesophageal reflux disease)     High cholesterol     Impotence     Morbid obesity ([address] 4/1/2015    Other and unspecified hyperlipidemia 4/1/2015    Paranoid schizophrenia ([address]     Preop cardiovascular exam 11/13/2015    wrist surgery; no significant CP; abnl stress ? artifact cleared with mild risk periop     Sciatica     Severe obesity (BMI >= 40) ([facility]) 11/13/2015    Sinusitis     Type II or unspecified type diabetes mellitus without mention of complication, not stated as uncontrolled 4/1/2015       Family History   Problem Relation Age of Onset    Stroke Mother 79    Heart Disease Mother     Diabetes Father     Cancer Brother     Heart Attack Neg Hx        Social History   Substance Use Topics    Smoking status: Current Some Day Smoker     Packs/day: 0.00     Years: 32.00     Types: Cigarettes     Start date: 8/1/1976    Smokeless tobacco: Never Used      Comment: per patient stated 1 or 2 a day     Alcohol use No        Current Outpatient Prescriptions   Medication Sig    gabapentin (NEURONTIN) 300 mg capsule Take 1 Cap by mouth three (3) times daily. For nerve pain. 1 cap qhs for 1 wk, then increase to 1 cap BID for 1 wk, then increase to 1 cap TID    [START ON 8/16/2017] oxyCODONE-acetaminophen (PERCOCET)  mg per tablet Take 1 Tab by mouth four (4) times daily as needed for Pain for up to 30 days. Max Daily Amount: 4 Tabs. for chronic, severe, refractory pain  Indications: Pain    diclofenac (VOLTAREN) 1 % gel Apply 2 g to affected area four (4) times daily. Apply to painful knees, shoulder, and wrist as needed    oxybutynin (DITROPAN) 5 mg tablet TAKE ONE TABLET BY MOUTH THREE TIMES DAILY    prazosin (MINIPRESS) 1 mg capsule TAKE ONE (1) CAPSULE BY MOUTH AT BEDTIME    predniSONE (DELTASONE) 10 mg tablet TAKE 4 TABLETS BY MOUTH DAILY FOR 5 DAYS    triazolam (HALCION) 0.125 mg tablet TAKE 2 TABLETS BY MOUTH 2 HOURS PRIOR TO PROCEDURE. TAKE 1 TAB 30 MINS PRIOR TO PROCEDURE.  sildenafil citrate (VIAGRA) 100 mg tablet Take 1 Tab by mouth as needed.  tadalafil (CIALIS) 20 mg tablet Take 1 Tab by mouth as needed.  finasteride (PROSCAR) 5 mg tablet Take 1 Tab by mouth daily. Indications: SYMPTOMATIC BENIGN PROSTATIC HYPERPLASIA    clopidogrel (PLAVIX) 75 mg tablet Take 1 Tab by mouth daily.  valsartan-hydroCHLOROthiazide (DIOVAN-HCT) 320-25 mg per tablet TAKE 1 TAB BY MOUTH DAILY.  trimethoprim-sulfamethoxazole (BACTRIM DS, SEPTRA DS) 160-800 mg per tablet TAKE 1 TABLET BY MOUTH TWICE A DAY    ciprofloxacin HCl (CIPRO) 500 mg tablet TAKE 1 TABLET BY MOUTH TWICE A DAY FOR 5 DAYS    trifluoperazine (STELAZINE) 5 mg tablet TAKE 1 TABLET BY MOUTH AT BEDTIME TAKE WITH 10 MG TABLET = 15 MG TOTAL DOSE AT BEDTIME    amoxicillin-clavulanate (AUGMENTIN) 875-125 mg per tablet     trifluoperazine (STELAZINE) 10 mg tablet     NOVOLOG MIX 70-30 FLEXPEN 100 unit/mL (70-30) inpn     simvastatin (ZOCOR) 20 mg tablet Take 1 Tab by mouth nightly.  (Patient taking differently: Take 40 mg by mouth nightly.)    prazosin (MINIPRESS) 2 mg capsule Take 1 Cap by mouth nightly.  albuterol (PROVENTIL HFA, VENTOLIN HFA, PROAIR HFA) 90 mcg/actuation inhaler 2 Puffs.  Insulin Lisp & Lisp Prot, Hum, (HUMALOG 75-25 MIX) 100 unit/mL (75-25) flexpen Inject beneath the skin.  amantadine HCl (SYMMETREL) 100 mg capsule     ammonium lactate (LAC-HYDRIN) 12 % lotion     Azelastine (ASTEPRO) 0.15 % (205.5 mcg) nasal spray     escitalopram oxalate (LEXAPRO) 20 mg tablet     PATANOL 0.1 % ophthalmic solution     polyethylene glycol (MIRALAX) 17 gram/dose powder     ranitidine (ZANTAC) 150 mg tablet     ziprasidone (GEODON) 80 mg capsule     fluticasone-salmeterol (ADVAIR) 250-50 mcg/dose diskus inhaler Take 1 Puff by inhalation every twelve (12) hours. Indications: INTRINSIC ASTHMA    OTHER  Lumbar Traction Belt    amLODIPine (NORVASC) 10 mg tablet Take 1 Tab by mouth daily.  tamsulosin (FLOMAX) 0.4 mg capsule Take 1 Cap by mouth daily.  fluticasone (FLONASE) 50 mcg/actuation nasal spray Two squirts both nostrils at bedtime    cyclobenzaprine (FLEXERIL) 5 mg tablet Take 5 mg by mouth three (3) times daily as needed for Muscle Spasm(s). Takes 1 to 2 tablets    docusate sodium (COLACE) 100 mg capsule Take 100 mg by mouth daily.  topiramate (TOPAMAX) 200 mg tablet Take  by mouth two (2) times a day.  traZODone (DESYREL) 150 mg tablet Take 150 mg by mouth nightly. No current facility-administered medications for this visit. Past Surgical History:   Procedure Laterality Date    HX HEENT  2017    sinus surgery    HX OTHER SURGICAL  2008    sinus    HX OTHER SURGICAL  2009    stress test    HX TURP  5/2016       Diagnostic Studies:  CARDIOLOGY STUDIES 4/8/2015 4/3/2015   Exercise Nuclear Stress Test Result - sev HTN response, 39%EF, antbasal/apical ischemia? artifact from arm   Echocardiogram - Complete Result 55%EF, mild DD/dil LA -       Visit Vitals    /88    Pulse 86    Ht 5' 9\" (1.753 m)    Wt 126.6 kg (279 lb)    BMI 41.2 kg/m2       Mr. Leesa Logan has a reminder for a \"due or due soon\" health maintenance. I have asked that he contact his primary care provider for follow-up on this health maintenance. Physical Exam   Constitutional: He is oriented to person, place, and time. He appears well-developed and well-nourished. No distress. HENT:   Head: Normocephalic and atraumatic. Mouth/Throat: Normal dentition. Eyes: Right eye exhibits no discharge. Left eye exhibits no discharge. No scleral icterus. Neck: Neck supple. No JVD present. Carotid bruit is not present. No thyromegaly present. Cardiovascular: Normal rate, regular rhythm, S1 normal, S2 normal, normal heart sounds and intact distal pulses. Exam reveals no gallop and no friction rub. No murmur heard. Pulmonary/Chest: Effort normal and breath sounds normal. He has no wheezes. He has no rales. Abdominal: Soft. He exhibits no mass. There is no tenderness. Musculoskeletal: He exhibits edema (trace). Lymphadenopathy:        Right cervical: No superficial cervical adenopathy present. Left cervical: No superficial cervical adenopathy present. Neurological: He is alert and oriented to person, place, and time. Skin: Skin is warm and dry. No rash noted. Psychiatric: He has a normal mood and affect. His behavior is normal.       ASSESSMENT and PLAN    Discussed the patient's above normal BMI with him. I have recommended the following interventions: dietary management education, guidance, and counseling . The BMI follow up plan is as follows: BMI is out of normal parameters and plan is as follows: I have counseled this patient on diet and exercise regimens          David Garner was seen today for coronary artery disease, hypertension and cholesterol problem.     Diagnoses and all orders for this visit:    Atherosclerosis of native coronary artery of native heart with angina pectoris [facility])  Comments:  atypical angina, 4/15 mild stress abnormality may be artifact  nl EF by echo; med Rx    HTN (hypertension), benign  Comments:  6/17 high?due to shoulder/knee pains. incr prazocin. Check home chart  Orders:  -     valsartan-hydroCHLOROthiazide (DIOVAN-HCT) 320-25 mg per tablet; Take 1 Tab by mouth daily. -     amLODIPine (NORVASC) 10 mg tablet; Take 1 Tab by mouth daily. -     prazosin (MINIPRESS) 5 mg capsule; Take 1 Cap by mouth nightly. Mixed hyperlipidemia  Comments:  11/16 [id number], ; 5/16 [id number], ;   Orders:  -     simvastatin (ZOCOR) 20 mg tablet; Take 1 Tab by mouth nightly. Tobacco dependence  Comments:  Patient advised to stop smoking to reduce future cardiovascular events  Orders:  -     [place] SMOKING AND TOBACCO USE CESSATION 3 - 10 MINUTES    Severe obesity (BMI >= 40) ([facility])  Comments:  Weight loss has been strongly encouraged by following dietary restrictions and an exercise routine. Pertinent laboratory and test data reviewed and discussed with patient.   See patient instructions also for other medical advice given    Medications Discontinued During This Encounter   Medication Reason    ciprofloxacin HCl (CIPRO) 500 mg tablet Therapy Completed    amantadine HCl (SYMMETREL) 100 mg capsule Therapy Completed    amoxicillin-clavulanate (AUGMENTIN) 875-125 mg per tablet Therapy Completed    prazosin (MINIPRESS) 1 mg capsule Duplicate Order    predniSONE (DELTASONE) 10 mg tablet Therapy Completed    triazolam (HALCION) 0.125 mg tablet Therapy Completed    clopidogrel (PLAVIX) 75 mg tablet Discontinued by Another Clinician    sildenafil citrate (VIAGRA) 100 mg tablet Cost of Medication    tadalafil (CIALIS) 20 mg tablet Cost of Medication    docusate sodium (COLACE) 100 mg capsule Alternate Therapy    Insulin Lisp & Lisp Prot, Hum, (HUMALOG 75-25 MIX) 100 unit/mL (75-25) flexpen Alternate Therapy    PATANOL 0.1 % ophthalmic solution Cost of Medication    ranitidine (ZANTAC) 150 mg tablet Therapy Completed    trimethoprim-sulfamethoxazole (BACTRIM DS, SEPTRA DS) 160-800 mg per tablet Therapy Completed    valsartan-hydroCHLOROthiazide (DIOVAN-HCT) 320-25 mg per tablet Reorder    simvastatin (ZOCOR) 20 mg tablet Reorder    prazosin (MINIPRESS) 2 mg capsule Reorder       Follow-up Disposition:  Return in about 6 months (around 12/8/2017), or if symptoms worsen or fail to improve, for with ekg.

## 2017-06-08 NOTE — PATIENT INSTRUCTIONS
Medications Discontinued During This Encounter   Medication Reason    ciprofloxacin HCl (CIPRO) 500 mg tablet Therapy Completed    amantadine HCl (SYMMETREL) 100 mg capsule Therapy Completed    amoxicillin-clavulanate (AUGMENTIN) 875-125 mg per tablet Therapy Completed    prazosin (MINIPRESS) 1 mg capsule Duplicate Order    predniSONE (DELTASONE) 10 mg tablet Therapy Completed    triazolam (HALCION) 0.125 mg tablet Therapy Completed    clopidogrel (PLAVIX) 75 mg tablet Discontinued by Another Clinician    sildenafil citrate (VIAGRA) 100 mg tablet Cost of Medication    tadalafil (CIALIS) 20 mg tablet Cost of Medication    docusate sodium (COLACE) 100 mg capsule Alternate Therapy    Insulin Lisp & Lisp Prot, Hum, (HUMALOG 75-25 MIX) 100 unit/mL (75-25) flexpen Alternate Therapy    PATANOL 0.1 % ophthalmic solution Cost of Medication    ranitidine (ZANTAC) 150 mg tablet Therapy Completed    trimethoprim-sulfamethoxazole (BACTRIM DS, SEPTRA DS) 160-800 mg per tablet Therapy Completed    valsartan-hydroCHLOROthiazide (DIOVAN-HCT) 320-25 mg per tablet Reorder    simvastatin (ZOCOR) 20 mg tablet Reorder    prazosin (MINIPRESS) 2 mg capsule Reorder       Orders Placed This Encounter    [place] SMOKING AND TOBACCO USE CESSATION 3 - 10 MINUTES    valsartan-hydroCHLOROthiazide (DIOVAN-HCT) 320-25 mg per tablet     Sig: Take 1 Tab by mouth daily. Dispense:  90 Tab     Refill:  1    simvastatin (ZOCOR) 20 mg tablet     Sig: Take 1 Tab by mouth nightly. Dispense:  90 Tab     Refill:  3    amLODIPine (NORVASC) 10 mg tablet     Sig: Take 1 Tab by mouth daily. Dispense:  90 Tab     Refill:  3    prazosin (MINIPRESS) 5 mg capsule     Sig: Take 1 Cap by mouth nightly. Dispense:  90 Cap     Refill:  2          Body Mass Index: Care Instructions  Your Care Instructions    Body mass index (BMI) can help you see if your weight is raising your risk for health problems.  It uses a formula to compare how much you weigh with how tall you are. · A BMI lower than 18.5 is considered underweight. · A BMI between 18.5 and 24.9 is considered healthy. · A BMI between 25 and 29.9 is considered overweight. A BMI of 30 or higher is considered obese. If your BMI is in the normal range, it means that you have a lower risk for weight-related health problems. If your BMI is in the overweight or obese range, you may be at increased risk for weight-related health problems, such as high blood pressure, heart disease, stroke, arthritis or joint pain, and diabetes. If your BMI is in the underweight range, you may be at increased risk for health problems such as fatigue, lower protection (immunity) against illness, muscle loss, bone loss, hair loss, and hormone problems. BMI is just one measure of your risk for weight-related health problems. You may be at higher risk for health problems if you are not active, you eat an unhealthy diet, or you drink too much alcohol or use tobacco products. Follow-up care is a key part of your treatment and safety. Be sure to make and go to all appointments, and call your doctor if you are having problems. It's also a good idea to know your test results and keep a list of the medicines you take. How can you care for yourself at home? · Practice healthy eating habits. This includes eating plenty of fruits, vegetables, whole grains, lean protein, and low-fat dairy. · If your doctor recommends it, get more exercise. Walking is a good choice. Bit by bit, increase the amount you walk every day. Try for at least 30 minutes on most days of the week. · Do not smoke. Smoking can increase your risk for health problems. If you need help quitting, talk to your doctor about stop-smoking programs and medicines. These can increase your chances of quitting for good. · Limit alcohol to 2 drinks a day for men and 1 drink a day for women. Too much alcohol can cause health problems.   If you have a BMI higher than 25  · Your doctor may do other tests to check your risk for weight-related health problems. This may include measuring the distance around your waist. A waist measurement of more than 40 inches in men or 35 inches in women can increase the risk of weight-related health problems. · Talk with your doctor about steps you can take to stay healthy or improve your health. You may need to make lifestyle changes to lose weight and stay healthy, such as changing your diet and getting regular exercise. If you have a BMI lower than 18.5  · Your doctor may do other tests to check your risk for health problems. · Talk with your doctor about steps you can take to stay healthy or improve your health. You may need to make lifestyle changes to gain or maintain weight and stay healthy, such as getting more healthy foods in your diet and doing exercises to build muscle. Where can you learn more? Go to http://bailey-cristian.info/. Enter S176 in the search box to learn more about \"Body Mass Index: Care Instructions. \"  Current as of: January 23, 2017  Content Version: 11.2  © 7915-6602 [employer]. Care instructions adapted under license by [employer] (which disclaims liability or warranty for this information). If you have questions about a medical condition or this instruction, always ask your healthcare professional. [address] any warranty or liability for your use of this information.

## 2017-06-08 NOTE — LETTER
Lovett Fabry 1960 
 
6/8/2017 Dear Julian Diaz MD 
 
I had the pleasure of evaluating  Mr. Salo See in office today. Below are the relevant portions of my assessment and plan of care. ICD-10-CM ICD-9-CM 1. Atherosclerosis of native coronary artery of native heart with angina pectoris ([facility] I25.119 414.01   
  413.9   
 atypical angina, 4/15 mild stress abnormality may be artifact 
nl EF by echo; med Rx 2. HTN (hypertension), benign I10 401.1 valsartan-hydroCHLOROthiazide (DIOVAN-HCT) 320-25 mg per tablet  
   amLODIPine (NORVASC) 10 mg tablet  
   prazosin (MINIPRESS) 5 mg capsule 6/17 high?due to shoulder/knee pains. incr prazocin. Check home chart 3. Mixed hyperlipidemia E78.2 272.2 simvastatin (ZOCOR) 20 mg tablet 11/16 [id number], ; 5/16 [id number], ;   
4. Tobacco dependence F17.200 305.1 [place] SMOKING AND TOBACCO USE CESSATION 3 - 10 MINUTES Patient advised to stop smoking to reduce future cardiovascular events 5. Severe obesity (BMI >= 40) ([facility]) E66.01 278.01 Weight loss has been strongly encouraged by following dietary restrictions and an exercise routine. Current Outpatient Prescriptions Medication Sig Dispense Refill  valsartan-hydroCHLOROthiazide (DIOVAN-HCT) 320-25 mg per tablet Take 1 Tab by mouth daily. 90 Tab 1  
 simvastatin (ZOCOR) 20 mg tablet Take 1 Tab by mouth nightly. 90 Tab 3  
 amLODIPine (NORVASC) 10 mg tablet Take 1 Tab by mouth daily. 90 Tab 3  prazosin (MINIPRESS) 5 mg capsule Take 1 Cap by mouth nightly. 90 Cap 2  
 gabapentin (NEURONTIN) 300 mg capsule Take 1 Cap by mouth three (3) times daily. For nerve pain. 1 cap qhs for 1 wk, then increase to 1 cap BID for 1 wk, then increase to 1 cap TID 90 Cap 5  [START ON 8/16/2017] oxyCODONE-acetaminophen (PERCOCET)  mg per tablet Take 1 Tab by mouth four (4) times daily as needed for Pain for up to 30 days. Max Daily Amount: 4 Tabs.  for chronic, severe, refractory pain Indications: Pain 120 Tab 0  
 diclofenac (VOLTAREN) 1 % gel Apply 2 g to affected area four (4) times daily. Apply to painful knees, shoulder, and wrist as needed 300 g 5  
 oxybutynin (DITROPAN) 5 mg tablet TAKE ONE TABLET BY MOUTH THREE TIMES DAILY  6  
 finasteride (PROSCAR) 5 mg tablet Take 1 Tab by mouth daily. Indications: SYMPTOMATIC BENIGN PROSTATIC HYPERPLASIA 90 Tab 3  
 trifluoperazine (STELAZINE) 5 mg tablet TAKE 1 TABLET BY MOUTH AT BEDTIME TAKE WITH 10 MG TABLET = 15 MG TOTAL DOSE AT BEDTIME  2  
 trifluoperazine (STELAZINE) 10 mg tablet   2  
 NOVOLOG MIX 70-30 FLEXPEN 100 unit/mL (70-30) inpn   3  
 albuterol (PROVENTIL HFA, VENTOLIN HFA, PROAIR HFA) 90 mcg/actuation inhaler 2 Puffs.  ammonium lactate (LAC-HYDRIN) 12 % lotion  Azelastine (ASTEPRO) 0.15 % (205.5 mcg) nasal spray  escitalopram oxalate (LEXAPRO) 20 mg tablet  polyethylene glycol (MIRALAX) 17 gram/dose powder  ziprasidone (GEODON) 80 mg capsule  fluticasone-salmeterol (ADVAIR) 250-50 mcg/dose diskus inhaler Take 1 Puff by inhalation every twelve (12) hours. Indications: INTRINSIC ASTHMA 1 Inhaler 6  
 OTHER  Lumbar Traction Belt 1 Device prn  amLODIPine (NORVASC) 10 mg tablet Take 1 Tab by mouth daily. 90 Tab 3  
 tamsulosin (FLOMAX) 0.4 mg capsule Take 1 Cap by mouth daily. 30 Cap 3  
 fluticasone (FLONASE) 50 mcg/actuation nasal spray Two squirts both nostrils at bedtime 1 Bottle 3  cyclobenzaprine (FLEXERIL) 5 mg tablet Take 5 mg by mouth three (3) times daily as needed for Muscle Spasm(s). Takes 1 to 2 tablets  topiramate (TOPAMAX) 200 mg tablet Take  by mouth two (2) times a day.  traZODone (DESYREL) 150 mg tablet Take 150 mg by mouth nightly. Orders Placed This Encounter  [place] SMOKING AND TOBACCO USE CESSATION 3 - 10 MINUTES  valsartan-hydroCHLOROthiazide (DIOVAN-HCT) 320-25 mg per tablet Sig: Take 1 Tab by mouth daily. Dispense:  90 Tab Refill:  1  simvastatin (ZOCOR) 20 mg tablet Sig: Take 1 Tab by mouth nightly. Dispense:  90 Tab Refill:  3  
 amLODIPine (NORVASC) 10 mg tablet Sig: Take 1 Tab by mouth daily. Dispense:  90 Tab Refill:  3  
 prazosin (MINIPRESS) 5 mg capsule Sig: Take 1 Cap by mouth nightly. Dispense:  90 Cap Refill:  2 If you have questions, please do not hesitate to call me. I look forward to following Mr. Julieth Hodgson along with you. Sincerely, Bradley Andujar MD

## 2017-06-08 NOTE — MR AVS SNAPSHOT
Visit Information Date & Time Provider Department Dept. Phone Encounter #  
 6/8/2017  9:00 AM Bashir Spence MD [facility] [address] [place] [address] [id number] Follow-up Instructions Return in about 6 months (around 12/8/2017), or if symptoms worsen or fail to improve, for with ekg. Your Appointments 9/11/2017  9:20 AM  
Follow Up with Lula Chou PA-C [facility] (JESSICA SCHEDULING) Appt Note: return in 3 months [address] [facility] [address]  
[phone] [facility] [address] [id number]  
  
    
 9/28/2017  9:30 AM  
Office Visit with Kailee Brown MD  
[facility] [address]) Appt Note: checkup [address] [address]  
[phone] [address] [address]  
  
    
 12/7/2017  9:15 AM  
ESTABLISHED PATIENT with Bashir Spence MD  
[facility]) Appt Note: 6 months with EKG  
 [address] [facility] [address], [address] [facility] [address]  
[address] [facility] [address], [address] Upcoming Health Maintenance Date Due Hepatitis C Screening 1960 HEMOGLOBIN A1C Q6M 1960 FOOT EXAM Q1 3/19/1969 MICROALBUMIN Q1 3/19/1969 EYE EXAM RETINAL OR DILATED Q1 3/19/1969 Pneumococcal 19-64 Medium Risk (1 of 1 - PPSV23) 3/19/1978 DTaP/Tdap/Td series (1 - Tdap) 3/19/1980 COLONOSCOPY 3/19/2014 LIPID PANEL Q1 5/16/2017 INFLUENZA AGE 9 TO ADULT 8/1/2017 Allergies as of 6/8/2017  Review Complete On: 6/8/2017 By: Bashir Spence MD  
  
 Severity Noted Reaction Type Reactions Seafood Medium 04/01/2015   Side Effect Hives \"deviled-crabs\"  Does fine with all other seafood Aspirin  03/05/2014    Hives Other reaction(s): Swelling Ditropan [Oxybutynin Chloride]  02/21/2017    Itching Tomato  04/28/2014    Hives Current Immunizations  Never Reviewed Name Date Influenza Vaccine 11/11/2014 Not reviewed this visit You Were Diagnosed With   
  
 Codes Comments Atherosclerosis of native coronary artery of native heart with angina pectoris ([facility] [address]    -  Primary ICD-10-CM: I25.119 ICD-9-CM: 414.01, 413.9 atypical angina, 4/15 mild stress abnormality may be artifact 
nl EF by echo; med Rx HTN (hypertension), benign     ICD-10-CM: I10 
ICD-9-CM: 401.1 6/17 high?due to shoulder/knee pains. incr prazocin. Check home chart Mixed hyperlipidemia     ICD-10-CM: E78.2 ICD-9-CM: 272.2 11/16 LDL67, ; 5/16 VTA745, ;   
 Tobacco dependence     ICD-10-CM: U75.991 ICD-9-CM: 305.1 Patient advised to stop smoking to reduce future cardiovascular events Severe obesity (BMI >= 40) (HCC)     ICD-10-CM: E66.01 
ICD-9-CM: 278.01 Weight loss has been strongly encouraged by following dietary restrictions and an exercise routine. Vitals BP Pulse Height(growth percentile) Weight(growth percentile) BMI Smoking Status 146/88 86 5' 9\" (1.753 m) 279 lb (126.6 kg) 41.2 kg/m2 Current Every Day Smoker Vitals History BMI and BSA Data Body Mass Index Body Surface Area  
 41.2 kg/m 2 2.48 m 2 Preferred Pharmacy Pharmacy Name Phone [facility] [address] [place] [address] [phone] Your Updated Medication List  
  
   
This list is accurate as of: 6/8/17  9:42 AM.  Always use your most recent med list.  
  
  
  
  
 albuterol 90 mcg/actuation inhaler Commonly known as:  PROVENTIL HFA, VENTOLIN HFA, PROAIR HFA  
2 Puffs. * amLODIPine 10 mg tablet Commonly known as:  Matthew [place] Take 1 Tab by mouth daily. * amLODIPine 10 mg tablet Commonly known as:  Matthew [place] Take 1 Tab by mouth daily. ammonium lactate 12 % lotion Commonly known as:  LAC-HYDRIN Azelastine 0.15 % (205.5 mcg) nasal spray Commonly known as:  ASTEPRO  
  
 cyclobenzaprine 5 mg tablet Commonly known as:  FLEXERIL Take 5 mg by mouth three (3) times daily as needed for Muscle Spasm(s). Takes 1 to 2 tablets  
  
 diclofenac 1 % Gel Commonly known as:  VOLTAREN Apply 2 g to affected area four (4) times daily. Apply to painful knees, shoulder, and wrist as needed  
  
 escitalopram oxalate 20 mg tablet Commonly known as:  LEXAPRO  
  
 finasteride 5 mg tablet Commonly known as:  PROSCAR Take 1 Tab by mouth daily. Indications: SYMPTOMATIC BENIGN PROSTATIC HYPERPLASIA  
  
 fluticasone 50 mcg/actuation nasal spray Commonly known as:  Lisandro Marrow Two squirts both nostrils at bedtime  
  
 fluticasone-salmeterol 250-50 mcg/dose diskus inhaler Commonly known as:  ADVAIR Take 1 Puff by inhalation every twelve (12) hours. Indications: INTRINSIC ASTHMA  
  
 gabapentin 300 mg capsule Commonly known as:  NEURONTIN Take 1 Cap by mouth three (3) times daily. For nerve pain. 1 cap qhs for 1 wk, then increase to 1 cap BID for 1 wk, then increase to 1 cap TID NovoLOG Mix 70-30 FlexPen 100 unit/mL (70-30) Inpn Generic drug:  insulin aspart protamine/insulin aspart OTHER  
 Lumbar Traction Belt  
  
 oxybutynin 5 mg tablet Commonly known as:  DITROPAN  
TAKE ONE TABLET BY MOUTH THREE TIMES DAILY  
  
 oxyCODONE-acetaminophen  mg per tablet Commonly known as:  PERCOCET Take 1 Tab by mouth four (4) times daily as needed for Pain for up to 30 days. Max Daily Amount: 4 Tabs. for chronic, severe, refractory pain  Indications: Pain Start taking on:  8/16/2017 polyethylene glycol 17 gram/dose powder Commonly known as:  MIRALAX  
  
 prazosin 5 mg capsule Commonly known as:  MINIPRESS Take 1 Cap by mouth nightly. simvastatin 20 mg tablet Commonly known as:  ZOCOR Take 1 Tab by mouth nightly. tamsulosin 0.4 mg capsule Commonly known as:  FLOMAX Take 1 Cap by mouth daily. topiramate 200 mg tablet Commonly known as:  TOPAMAX Take  by mouth two (2) times a day. traZODone 150 mg tablet Commonly known as:  [place] Take 150 mg by mouth nightly. * trifluoperazine 10 mg tablet Commonly known as:  STELAZINE  
  
 * trifluoperazine 5 mg tablet Commonly known as:  STELAZINE  
TAKE 1 TABLET BY MOUTH AT BEDTIME TAKE WITH 10 MG TABLET = 15 MG TOTAL DOSE AT BEDTIME  
  
 valsartan-hydroCHLOROthiazide 320-25 mg per tablet Commonly known as:  DIOVAN-HCT Take 1 Tab by mouth daily. ziprasidone 80 mg capsule Commonly known as:  GEODON  
  
 * Notice: This list has 4 medication(s) that are the same as other medications prescribed for you. Read the directions carefully, and ask your doctor or other care provider to review them with you. Prescriptions Sent to Pharmacy Refills  
 valsartan-hydroCHLOROthiazide (DIOVAN-HCT) 320-25 mg per tablet 1 Sig: Take 1 Tab by mouth daily. Class: Normal  
 Pharmacy: [address] [place] [address] Ph #: [phone] Route: Oral  
 simvastatin (ZOCOR) 20 mg tablet 3 Sig: Take 1 Tab by mouth nightly. Class: Normal  
 Pharmacy: [address] [place] [address] Ph #: [phone] Route: Oral  
 amLODIPine (NORVASC) 10 mg tablet 3 Sig: Take 1 Tab by mouth daily. Class: Normal  
 Pharmacy: [address] [place] [address] Ph #: [phone] Route: Oral  
 prazosin (MINIPRESS) 5 mg capsule 2 Sig: Take 1 Cap by mouth nightly. Class: Normal  
 Pharmacy: [address] [place] [address] Ph #: [phone] Route: Oral  
  
We Performed the Following [place] SMOKING AND TOBACCO USE CESSATION 3 - 10 MINUTES [10154 CPT(R)] Follow-up Instructions Return in about 6 months (around 12/8/2017), or if symptoms worsen or fail to improve, for with ekg. Patient Instructions Medications Discontinued During This Encounter Medication Reason  ciprofloxacin HCl (CIPRO) 500 mg tablet Therapy Completed  amantadine HCl (SYMMETREL) 100 mg capsule Therapy Completed  amoxicillin-clavulanate (AUGMENTIN) 875-125 mg per tablet Therapy Completed  prazosin (MINIPRESS) 1 mg capsule Duplicate Order  predniSONE (DELTASONE) 10 mg tablet Therapy Completed  triazolam (HALCION) 0.125 mg tablet Therapy Completed  clopidogrel (PLAVIX) 75 mg tablet Discontinued by Another Clinician  sildenafil citrate (VIAGRA) 100 mg tablet Cost of Medication  tadalafil (CIALIS) 20 mg tablet Cost of Medication  docusate sodium (COLACE) 100 mg capsule Alternate Therapy  Insulin Lisp & Lisp Prot, Hum, (HUMALOG 75-25 MIX) 100 unit/mL (75-25) flexpen Alternate Therapy  PATANOL 0.1 % ophthalmic solution Cost of Medication  ranitidine (ZANTAC) 150 mg tablet Therapy Completed  trimethoprim-sulfamethoxazole (BACTRIM DS, SEPTRA DS) 160-800 mg per tablet Therapy Completed  valsartan-hydroCHLOROthiazide (DIOVAN-HCT) 320-25 mg per tablet Reorder  simvastatin (ZOCOR) 20 mg tablet Reorder  prazosin (MINIPRESS) 2 mg capsule Reorder Orders Placed This Encounter  TX SMOKING AND TOBACCO USE CESSATION 3 - 10 MINUTES  valsartan-hydroCHLOROthiazide (DIOVAN-HCT) 320-25 mg per tablet Sig: Take 1 Tab by mouth daily. Dispense:  90 Tab Refill:  1  simvastatin (ZOCOR) 20 mg tablet Sig: Take 1 Tab by mouth nightly. Dispense:  90 Tab Refill:  3  
 amLODIPine (NORVASC) 10 mg tablet Sig: Take 1 Tab by mouth daily. Dispense:  90 Tab Refill:  3  
 prazosin (MINIPRESS) 5 mg capsule Sig: Take 1 Cap by mouth nightly. Dispense:  90 Cap Refill:  2 Body Mass Index: Care Instructions Your Care Instructions Body mass index (BMI) can help you see if your weight is raising your risk for health problems. It uses a formula to compare how much you weigh with how tall you are. · A BMI lower than 18.5 is considered underweight. · A BMI between 18.5 and 24.9 is considered healthy. · A BMI between 25 and 29.9 is considered overweight. A BMI of 30 or higher is considered obese. If your BMI is in the normal range, it means that you have a lower risk for weight-related health problems. If your BMI is in the overweight or obese range, you may be at increased risk for weight-related health problems, such as high blood pressure, heart disease, stroke, arthritis or joint pain, and diabetes. If your BMI is in the underweight range, you may be at increased risk for health problems such as fatigue, lower protection (immunity) against illness, muscle loss, bone loss, hair loss, and hormone problems. BMI is just one measure of your risk for weight-related health problems. You may be at higher risk for health problems if you are not active, you eat an unhealthy diet, or you drink too much alcohol or use tobacco products. Follow-up care is a key part of your treatment and safety. Be sure to make and go to all appointments, and call your doctor if you are having problems. It's also a good idea to know your test results and keep a list of the medicines you take. How can you care for yourself at home? · Practice healthy eating habits. This includes eating plenty of fruits, vegetables, whole grains, lean protein, and low-fat dairy. · If your doctor recommends it, get more exercise. Walking is a good choice. Bit by bit, increase the amount you walk every day. Try for at least 30 minutes on most days of the week. · Do not smoke. Smoking can increase your risk for health problems. If you need help quitting, talk to your doctor about stop-smoking programs and medicines. These can increase your chances of quitting for good. · Limit alcohol to 2 drinks a day for men and 1 drink a day for women.  Too much alcohol can cause health problems. If you have a BMI higher than 25 · Your doctor may do other tests to check your risk for weight-related health problems. This may include measuring the distance around your waist. A waist measurement of more than 40 inches in men or 35 inches in women can increase the risk of weight-related health problems. · Talk with your doctor about steps you can take to stay healthy or improve your health. You may need to make lifestyle changes to lose weight and stay healthy, such as changing your diet and getting regular exercise. If you have a BMI lower than 18.5 · Your doctor may do other tests to check your risk for health problems. · Talk with your doctor about steps you can take to stay healthy or improve your health. You may need to make lifestyle changes to gain or maintain weight and stay healthy, such as getting more healthy foods in your diet and doing exercises to build muscle. Where can you learn more? Go to http://bailey-cristian.info/. Enter S176 in the search box to learn more about \"Body Mass Index: Care Instructions. \" Current as of: January 23, 2017 Content Version: 11.2 © 6742-4184 [employer]. Care instructions adapted under license by [employer] (which disclaims liability or warranty for this information). If you have questions about a medical condition or this instruction, always ask your healthcare professional. [address] any warranty or liability for your use of this information. Introducing [facility]! Bria Licona introduces [employer] patient portal. Now you can access parts of your medical record, email your doctor's office, and request medication refills online. 1. In your internet browser, go to [email or web address] 2. Click on the First Time User? Click Here link in the Sign In box. You will see the New Member Sign Up page. 3. Enter your [employer] Access Code exactly as it appears below. You will not need to use this code after youve completed the sign-up process. If you do not sign up before the expiration date, you must request a new code. · [employer] Access Code: [email or web address] Expires: 8/30/2017 10:02 AM 
 
4. Enter the last four digits of your Social Security Number (xxxx) and Date of Birth (mm/dd/yyyy) as indicated and click Submit. You will be taken to the next sign-up page. 5. Create a [employer] ID. This will be your [employer] login ID and cannot be changed, so think of one that is secure and easy to remember. 6. Create a [employer] password. You can change your password at any time. 7. Enter your Password Reset Question and Answer. This can be used at a later time if you forget your password. 8. Enter your e-mail address. You will receive e-mail notification when new information is available in [address]. 9. Click Sign Up. You can now view and download portions of your medical record. 10. Click the Download Summary menu link to download a portable copy of your medical information. If you have questions, please visit the Frequently Asked Questions section of the [employer] website. Remember, [employer] is NOT to be used for urgent needs. For medical emergencies, dial 911. Now available from your iPhone and Android! Please provide this summary of care documentation to your next provider. Your primary care clinician is listed as RAMAKRISHNA GROSSMAN. If you have any questions after today's visit, please call [phone].

## 2017-07-11 PROBLEM — I95.9 HYPOTENSION: Status: ACTIVE | Noted: 2017-07-11

## 2017-07-19 ENCOUNTER — TELEPHONE (OUTPATIENT)
Dept: PAIN MANAGEMENT | Age: 57
End: 2017-07-19

## 2017-07-19 NOTE — TELEPHONE ENCOUNTER
Received call from patient stating that he received norco 5/325mg (#20); patient requested return call ; attempted to return patient's call ; no answer at number given; vm left to contact triage line.

## 2017-07-20 ENCOUNTER — TELEPHONE (OUTPATIENT)
Dept: PAIN MANAGEMENT | Age: 57
End: 2017-07-20

## 2017-07-20 NOTE — TELEPHONE ENCOUNTER
The pt called the office to report that he was recently hospitalized. He was given a prescription for norco 5/325 mg #20 tabs. I called and spoke to the pt. He stated that he was in the hospital for a urology procedure and that it did not go well. He was given a prescription for the norco at discharge late last week but he did not fill them until 07/18/17. This information was brought to provider GUILLE Bolanos for review. He states that the pt can continue to take the percocet QID prn but only take the hydrocodone in between for the bladder issue. This information was relayed to the pt and he verbalized understanding. There were no questions from the pt at this time.

## 2017-08-29 ENCOUNTER — OFFICE VISIT (OUTPATIENT)
Dept: CARDIOLOGY CLINIC | Age: 57
End: 2017-08-29

## 2017-08-29 VITALS
BODY MASS INDEX: 43.69 KG/M2 | HEART RATE: 101 BPM | SYSTOLIC BLOOD PRESSURE: 140 MMHG | HEIGHT: 69 IN | WEIGHT: 295 LBS | DIASTOLIC BLOOD PRESSURE: 89 MMHG

## 2017-08-29 DIAGNOSIS — E66.01 SEVERE OBESITY (BMI >= 40) (HCC): ICD-10-CM

## 2017-08-29 DIAGNOSIS — I10 HTN (HYPERTENSION), BENIGN: Primary | ICD-10-CM

## 2017-08-29 DIAGNOSIS — F17.200 TOBACCO DEPENDENCE: ICD-10-CM

## 2017-08-29 DIAGNOSIS — E78.2 MIXED HYPERLIPIDEMIA: ICD-10-CM

## 2017-08-29 DIAGNOSIS — I25.119 ATHEROSCLEROSIS OF NATIVE CORONARY ARTERY OF NATIVE HEART WITH ANGINA PECTORIS (HCC): ICD-10-CM

## 2017-08-29 RX ORDER — VALSARTAN 80 MG/1
80 TABLET ORAL DAILY
Qty: 90 TAB | Refills: 1 | Status: SHIPPED | OUTPATIENT
Start: 2017-08-29 | End: 2017-10-06 | Stop reason: SDUPTHER

## 2017-08-29 RX ORDER — DILTIAZEM HYDROCHLORIDE 240 MG/1
240 CAPSULE, COATED, EXTENDED RELEASE ORAL DAILY
Qty: 30 CAP | Refills: 1 | Status: SHIPPED | OUTPATIENT
Start: 2017-08-29 | End: 2017-09-22 | Stop reason: SDUPTHER

## 2017-08-29 RX ORDER — SIMVASTATIN 20 MG/1
20 TABLET, FILM COATED ORAL
Qty: 90 TAB | Refills: 1 | Status: SHIPPED | OUTPATIENT
Start: 2017-08-29 | End: 2017-11-09 | Stop reason: ALTCHOICE

## 2017-08-29 RX ORDER — PRAZOSIN HYDROCHLORIDE 5 MG/1
5 CAPSULE ORAL
Qty: 90 CAP | Refills: 2 | Status: CANCELLED | OUTPATIENT
Start: 2017-08-29

## 2017-08-29 RX ORDER — AMLODIPINE BESYLATE 5 MG/1
5 TABLET ORAL DAILY
Qty: 90 TAB | Refills: 1 | Status: CANCELLED | OUTPATIENT
Start: 2017-08-29

## 2017-08-29 NOTE — PROGRESS NOTES
1. Have you been to the ER, urgent care clinic since your last visit? Hospitalized since your last visit? Yes When: *** Where: *** Reason for visit: ***    2. Have you seen or consulted any other health care providers outside of the [address] [place] [address] since your last visit? Include any pap smears or colon screening. {Yes when where and reason for visit:20441}     3. Since your last visit, have you had any of the following symptoms? {[facility] AMB CARDIO SYMPTOMS:20509}. Explain:***    4.  Have you had any blood work, X-rays or cardiac testing? {Yes when where and reason for visit:20441}     Requested: {YES/NO:23216}     In [facility]: {YES/NO:79731}    5. Where do you normally have your labs drawn? ***    6. Do you need any refills today?    ***

## 2017-08-29 NOTE — PROGRESS NOTES
1. Have you been to the ER, urgent care clinic since your last visit? Hospitalized since your last visit? Yes When: July 11 Where: [facility] Reason for visit: Bladder surgery    2. Have you seen or consulted any other health care providers outside of the [address] [place] [address] since your last visit? Include any pap smears or colon screening. Yes When: August 2017 Dr Frandy Broussard [facility]    3. Since your last visit, have you had any of the following symptoms? No          4. Have you had any blood work, X-rays or cardiac testing? Yes LABS July 2017               5.  Where do you normally have your labs drawn? PCP      6. Do you need any refills today? yes      Requested Prescriptions     Pending Prescriptions Disp Refills    amLODIPine (NORVASC) 5 mg tablet 30 Tab 0     Sig: Take 1 Tab by mouth daily.  valsartan (DIOVAN) 160 mg tablet 90 Tab 1     Sig: Take 0.5 Tabs by mouth daily. Indications: hypertension    simvastatin (ZOCOR) 20 mg tablet 90 Tab 3     Sig: Take 1 Tab by mouth nightly.  prazosin (MINIPRESS) 5 mg capsule 90 Cap 2     Sig: Take 1 Cap by mouth nightly.

## 2017-08-29 NOTE — PROGRESS NOTES
HISTORY OF PRESENT ILLNESS  Yvonne Contreras is a 61 y.o. male. Hypotension   The history is provided by the medical records and patient (intraop in 7/17; TURP cancelled). Pertinent negatives include no chest pain, no headaches and no shortness of breath. Cholesterol Problem   The history is provided by the medical records. This is a chronic problem. Pertinent negatives include no chest pain, no headaches and no shortness of breath. Hypertension   The history is provided by the patient and medical records. This is a chronic problem. Pertinent negatives include no chest pain, no headaches and no shortness of breath. Leg Swelling   The history is provided by the patient. This is a new problem. The current episode started more than 1 week ago (9/16). The problem occurs every several days. The problem has been rapidly improving. Pertinent negatives include no chest pain, no headaches and no shortness of breath. The symptoms are aggravated by standing. The symptoms are relieved by sleep. Review of Systems   Constitutional: Negative for chills, diaphoresis, fever, malaise/fatigue and weight loss. HENT: Negative for nosebleeds. Eyes: Negative for discharge. Respiratory: Negative for cough, shortness of breath and wheezing. Cardiovascular: Positive for palpitations and leg swelling. Negative for chest pain, orthopnea, claudication and PND. Gastrointestinal: Negative for diarrhea, nausea and vomiting. Genitourinary: Negative for dysuria and hematuria. Musculoskeletal: Negative for joint pain. Skin: Negative for rash. Neurological: Positive for dizziness. Negative for seizures, loss of consciousness and headaches. Endo/Heme/Allergies: Negative for polydipsia. Does not bruise/bleed easily. Psychiatric/Behavioral: Negative for depression and substance abuse. The patient does not have insomnia.       Allergies   Allergen Reactions    Seafood Hives     \"deviled-crabs\"  Does fine with all other seafood    Aspirin Hives     Other reaction(s): facial swelling     Tomato Hives       Past Medical History:   Diagnosis Date    Allergic rhinitis     Asthma     Atherosclerosis of native coronary artery with angina pectoris ([address] 5/14/2015    atypical angina, mild stress abnormality may be artifact nl EF by echo     BPH (benign prostatic hyperplasia)     Bulbous urethral stricture     Chronic epididymitis     Chronic pain     shoulder, neck, knee    Coronary artery disease involving native coronary artery of native heart with angina pectoris ([address] 5/13/2016    chr atypical CP for few seconds only     Diabetes mellitus ([address]     DVT (deep venous thrombosis) ([address] 2013    left leg    Emphysema     Essential hypertension     GERD (gastroesophageal reflux disease)     High cholesterol     Hypertension     Impotence     Incontinence     Intermittent urinary stream     Morbid obesity ([address] 4/1/2015    Multiple trauma     LEFT SHOULDER, LEFT WRIST, NECK, AND BACK    Other and unspecified hyperlipidemia 4/1/2015    Paranoid schizophrenia ([address]     Preop cardiovascular exam 11/13/2015    wrist surgery; no significant CP; abnl stress ? artifact cleared with mild risk periop     Sciatica     Severe obesity (BMI >= 40) ([facility] 11/13/2015    Sinusitis     Spermatocele of epididymis     Type II or unspecified type diabetes mellitus without mention of complication, not stated as uncontrolled 4/1/2015    Urinary urgency     Weak urinary stream        Family History   Problem Relation Age of Onset    Stroke Mother 79    Heart Disease Mother     Diabetes Father     Cancer Brother     Heart Attack Neg Hx        Social History   Substance Use Topics    Smoking status: Current Every Day Smoker     Packs/day: 0.25     Years: 32.00     Types: Cigarettes     Start date: 8/1/1976    Smokeless tobacco: Never Used      Comment: per patient stated 1 or 2 a day ready to stop    Alcohol use No        Current Outpatient Prescriptions   Medication Sig    valsartan (DIOVAN) 160 mg tablet Take 1 Tab by mouth daily. (Patient taking differently: Take 80 mg by mouth daily.)    amLODIPine (NORVASC) 5 mg tablet Take 1 Tab by mouth daily.  cholecalciferol (VITAMIN D3) 1,000 unit cap Take 1,000 Units by mouth daily. Indications: PREVENTION OF VITAMIN D DEFICIENCY    montelukast (SINGULAIR) 10 mg tablet TAKE 1 TABLET BY MOUTH EVERY DAY    simvastatin (ZOCOR) 20 mg tablet Take 1 Tab by mouth nightly.  prazosin (MINIPRESS) 5 mg capsule Take 1 Cap by mouth nightly.  gabapentin (NEURONTIN) 300 mg capsule Take 1 Cap by mouth three (3) times daily. For nerve pain. 1 cap qhs for 1 wk, then increase to 1 cap BID for 1 wk, then increase to 1 cap TID    oxyCODONE-acetaminophen (PERCOCET)  mg per tablet Take 1 Tab by mouth four (4) times daily as needed for Pain for up to 30 days. Max Daily Amount: 4 Tabs. for chronic, severe, refractory pain  Indications: Pain    diclofenac (VOLTAREN) 1 % gel Apply 2 g to affected area four (4) times daily. Apply to painful knees, shoulder, and wrist as needed    trifluoperazine (STELAZINE) 5 mg tablet TAKE 1 TABLET BY MOUTH AT BEDTIME TAKE WITH 10 MG TABLET = 15 MG TOTAL DOSE AT BEDTIME    trifluoperazine (STELAZINE) 10 mg tablet Indications: SCHIZOPHRENIA    NOVOLOG MIX 70-30 FLEXPEN 100 unit/mL (70-30) inpn     albuterol (PROVENTIL HFA, VENTOLIN HFA, PROAIR HFA) 90 mcg/actuation inhaler 2 Puffs.  ammonium lactate (LAC-HYDRIN) 12 % lotion     Azelastine (ASTEPRO) 0.15 % (205.5 mcg) nasal spray     escitalopram oxalate (LEXAPRO) 20 mg tablet Take 20 mg by mouth daily.  polyethylene glycol (MIRALAX) 17 gram/dose powder 17 g as needed.  ziprasidone (GEODON) 80 mg capsule daily as needed.     fluticasone (FLONASE) 50 mcg/actuation nasal spray Two squirts both nostrils at bedtime    cyclobenzaprine (FLEXERIL) 5 mg tablet Take 5 mg by mouth three (3) times daily as needed for Muscle Spasm(s). Takes 1 to 2 tablets    topiramate (TOPAMAX) 200 mg tablet Take  by mouth two (2) times a day. Indications: MIGRAINE PREVENTION    traZODone (DESYREL) 150 mg tablet Take 150 mg by mouth nightly.  finasteride (PROSCAR) 5 mg tablet Take 1 Tab by mouth daily. Indications: SYMPTOMATIC BENIGN PROSTATIC HYPERPLASIA    fluticasone-salmeterol (ADVAIR) 250-50 mcg/dose diskus inhaler Take 1 Puff by inhalation every twelve (12) hours. Indications: INTRINSIC ASTHMA    tamsulosin (FLOMAX) 0.4 mg capsule Take 1 Cap by mouth daily. No current facility-administered medications for this visit. Past Surgical History:   Procedure Laterality Date    HX CARPAL TUNNEL RELEASE Left     HX HEENT  2017    sinus surgery    HX OTHER SURGICAL  2008    sinus    HX OTHER SURGICAL  2009    stress test    HX OTHER SURGICAL  03/31/2016    EPIDURAL INJECTIONS    HX SHOULDER ARTHROSCOPY Left     HX TURP  5/2016    HX UROLOGICAL  07/14/2017    Cysto w/ dilation of bulbourethral stricture, Retrograde urethogram; Dr. Akila Hernandez       Diagnostic Studies:  CARDIOLOGY STUDIES 4/8/2015 4/3/2015   Exercise Nuclear Stress Test Result - sev HTN response, 39%EF, antbasal/apical ischemia? artifact from arm   Echocardiogram - Complete Result 55%EF, mild DD/dil LA -   Some recent data might be hidden   7/17 Nuc Stress  IMPRESSION:     1. No evidence of ischemia. 2.  Ejection fraction calculated at 33%    7/17 ECHO  1. Normal LV size and systolic function. EF ~ 55%  2. Grade I diastolic dysfunction. 3. No significant valvular pathology. Visit Vitals    /89    Pulse (!) 101    Ht 5' 9\" (1.753 m)    Wt 133.8 kg (295 lb)    BMI 43.56 kg/m2       Mr. Brian Tamayo has a reminder for a \"due or due soon\" health maintenance. I have asked that he contact his primary care provider for follow-up on this health maintenance. Physical Exam   Constitutional: He is oriented to person, place, and time.  He appears well-developed and well-nourished. No distress. HENT:   Head: Normocephalic and atraumatic. Mouth/Throat: Normal dentition. Eyes: Right eye exhibits no discharge. Left eye exhibits no discharge. No scleral icterus. Neck: Neck supple. No JVD present. Carotid bruit is not present. No thyromegaly present. Cardiovascular: Normal rate, regular rhythm, S1 normal, S2 normal, normal heart sounds and intact distal pulses. Exam reveals no gallop and no friction rub. No murmur heard. Pulmonary/Chest: Effort normal and breath sounds normal. He has no wheezes. He has no rales. Abdominal: Soft. He exhibits no mass. There is no tenderness. Musculoskeletal: He exhibits edema (trace). Lymphadenopathy:        Right cervical: No superficial cervical adenopathy present. Left cervical: No superficial cervical adenopathy present. Neurological: He is alert and oriented to person, place, and time. Skin: Skin is warm and dry. No rash noted. Psychiatric: He has a normal mood and affect. His behavior is normal.       ASSESSMENT and PLAN    Discussed the patient's above normal BMI with him. I have recommended the following interventions: dietary management education, guidance, and counseling . The BMI follow up plan is as follows: BMI is out of normal parameters and plan is as follows: I have counseled this patient on diet and exercise regimens          Diagnoses and all orders for this visit:    1. HTN (hypertension), benign  Comments:  8/17 change norvasc to diltiazem CD for mild tachy also;     Orders:  -     valsartan (DIOVAN) 80 mg tablet; Take 1 Tab by mouth daily. Indications: hypertension  -     dilTIAZem CD (CARDIZEM CD) 240 mg ER capsule; Take 1 Cap by mouth daily. Indications: hypertension    2. Mixed hyperlipidemia  Comments:  11/16 GED17, ; 5/16 [id number], ;   Orders:  -     simvastatin (ZOCOR) 20 mg tablet; Take 1 Tab by mouth nightly.     3. Atherosclerosis of native coronary artery of native heart with angina pectoris ([facility]  Comments:  7/17 nl stress test    4. Tobacco dependence  Comments:  Patient advised to stop smoking to reduce future cardiovascular events. 5. Severe obesity (BMI >= 40) ([facility])  Comments:  Weight loss has been strongly encouraged by following dietary restrictions and an exercise routine. Pertinent laboratory and test data reviewed and discussed with patient.   See patient instructions also for other medical advice given    Medications Discontinued During This Encounter   Medication Reason    amLODIPine (NORVASC) 5 mg tablet Alternate Therapy    valsartan (DIOVAN) 160 mg tablet Reorder    simvastatin (ZOCOR) 20 mg tablet Reorder       Follow-up Disposition:  Return in about 3 months (around 11/29/2017), or if symptoms worsen or fail to improve, for post test.

## 2017-08-29 NOTE — MR AVS SNAPSHOT
Visit Information Date & Time Provider Department Dept. Phone Encounter #  
 8/29/2017 12:15 PM Betito Gates MD [facility] [address] [place] [address] [id number] Follow-up Instructions Return in about 3 months (around 11/29/2017), or if symptoms worsen or fail to improve, for post test.  
  
Your Appointments 9/11/2017  9:20 AM  
Follow Up with Michelle Mills PA-C [facility] (JESSICA SCHEDULING) Appt Note: return in 3 months [address] [facility] [address]  
[phone]  [address] [id number]  
  
    
 9/28/2017  9:30 AM  
Office Visit with Marylu Brand MD  
[facility]) Appt Note: checkup [address] [address]  
[phone] [phone]  
  
    
 11/9/2017 11:00 AM  
ESTABLISHED PATIENT with Betito Gates MD  
[facility]) Appt Note: 3 months [address] [facility], [address] [facility] [address]  
13[address] [facility], [address] Upcoming Health Maintenance Date Due Hepatitis C Screening 1960 HEMOGLOBIN A1C Q6M 1960 FOOT EXAM Q1 3/19/1969 MICROALBUMIN Q1 3/19/1969 EYE EXAM RETINAL OR DILATED Q1 3/19/1969 Pneumococcal 19-64 Medium Risk (1 of 1 - PPSV23) 3/19/1978 DTaP/Tdap/Td series (1 - Tdap) 3/19/1980 COLONOSCOPY 3/19/2014 LIPID PANEL Q1 5/16/2017 INFLUENZA AGE 9 TO ADULT 8/1/2017 Allergies as of 8/29/2017  Review Complete On: 8/29/2017 By: Betito Gates MD  
  
 Severity Noted Reaction Type Reactions Seafood Medium 04/01/2015   Systemic Hives \"deviled-crabs\"  Does fine with all other seafood Aspirin  03/05/2014   Systemic Hives Other reaction(s): facial swelling Tomato  04/28/2014   Systemic Hives Current Immunizations  Never Reviewed Name Date Influenza Vaccine 11/11/2014 Not reviewed this visit You Were Diagnosed With   
  
 Codes Comments HTN (hypertension), benign    -  Primary ICD-10-CM: I10 
ICD-9-CM: 401.1 8/17 change norvasc to diltiazem CD for mild tachy also;  
  
 Mixed hyperlipidemia     ICD-10-CM: E78.2 ICD-9-CM: 272.2 11/16 LDL67, ; 5/16 AWU010, ;   
 Atherosclerosis of native coronary artery of native heart with angina pectoris ([facility] [address] [address]     ICD-10-CM: I25.119 ICD-9-CM: 414.01, 413.9 7/17 nl stress test  
 Tobacco dependence     ICD-10-CM: F17.200 ICD-9-CM: 305.1 Patient advised to stop smoking to reduce future cardiovascular events. Severe obesity (BMI >= 40) (HCC)     ICD-10-CM: E66.01 
ICD-9-CM: 278.01 Weight loss has been strongly encouraged by following dietary restrictions and an exercise routine. Vitals BP Pulse Height(growth percentile) Weight(growth percentile) BMI Smoking Status 140/89 (!) 101 5' 9\" (1.753 m) 295 lb (133.8 kg) 43.56 kg/m2 Current Every Day Smoker Vitals History BMI and BSA Data Body Mass Index Body Surface Area  
 43.56 kg/m 2 2.55 m 2 Preferred Pharmacy Pharmacy Name Phone [facility] [address] [place] [address] [phone] Your Updated Medication List  
  
   
This list is accurate as of: 8/29/17  1:04 PM.  Always use your most recent med list.  
  
  
  
  
 albuterol 90 mcg/actuation inhaler Commonly known as:  PROVENTIL HFA, VENTOLIN HFA, PROAIR HFA  
2 Puffs. ammonium lactate 12 % lotion Commonly known as:  LAC-HYDRIN Azelastine 0.15 % (205.5 mcg) nasal spray Commonly known as:  ASTEPRO  
  
 cyclobenzaprine 5 mg tablet Commonly known as:  FLEXERIL Take 5 mg by mouth three (3) times daily as needed for Muscle Spasm(s). Takes 1 to 2 tablets  
  
 diclofenac 1 % Gel Commonly known as:  VOLTAREN  
 Apply 2 g to affected area four (4) times daily. Apply to painful knees, shoulder, and wrist as needed  
  
 dilTIAZem  mg ER capsule Commonly known as:  CARDIZEM CD Take 1 Cap by mouth daily. Indications: hypertension  
  
 escitalopram oxalate 20 mg tablet Commonly known as:  Winnie Bread Take 20 mg by mouth daily. finasteride 5 mg tablet Commonly known as:  PROSCAR Take 1 Tab by mouth daily. Indications: SYMPTOMATIC BENIGN PROSTATIC HYPERPLASIA  
  
 fluticasone 50 mcg/actuation nasal spray Commonly known as:  Teda Lacks Two squirts both nostrils at bedtime  
  
 fluticasone-salmeterol 250-50 mcg/dose diskus inhaler Commonly known as:  ADVAIR Take 1 Puff by inhalation every twelve (12) hours. Indications: INTRINSIC ASTHMA  
  
 gabapentin 300 mg capsule Commonly known as:  NEURONTIN Take 1 Cap by mouth three (3) times daily. For nerve pain. 1 cap qhs for 1 wk, then increase to 1 cap BID for 1 wk, then increase to 1 cap TID  
  
 montelukast 10 mg tablet Commonly known as:  SINGULAIR  
TAKE 1 TABLET BY MOUTH EVERY DAY NovoLOG Mix 70-30 FlexPen 100 unit/mL (70-30) Inpn Generic drug:  insulin aspart protamine/insulin aspart  
  
 oxyCODONE-acetaminophen  mg per tablet Commonly known as:  PERCOCET Take 1 Tab by mouth four (4) times daily as needed for Pain for up to 30 days. Max Daily Amount: 4 Tabs. for chronic, severe, refractory pain  Indications: Pain  
  
 polyethylene glycol 17 gram/dose powder Commonly known as:  Bent Prince 17 g as needed. prazosin 5 mg capsule Commonly known as:  MINIPRESS Take 1 Cap by mouth nightly. simvastatin 20 mg tablet Commonly known as:  ZOCOR Take 1 Tab by mouth nightly. tamsulosin 0.4 mg capsule Commonly known as:  FLOMAX Take 1 Cap by mouth daily. topiramate 200 mg tablet Commonly known as:  TOPAMAX Take  by mouth two (2) times a day.  Indications: MIGRAINE PREVENTION  
  
 traZODone 150 mg tablet Commonly known as:  Jennifer Osman Take 150 mg by mouth nightly. * trifluoperazine 10 mg tablet Commonly known as:  STELAZINE Indications: SCHIZOPHRENIA  
  
 * trifluoperazine 5 mg tablet Commonly known as:  STELAZINE  
TAKE 1 TABLET BY MOUTH AT BEDTIME TAKE WITH 10 MG TABLET = 15 MG TOTAL DOSE AT BEDTIME  
  
 valsartan 80 mg tablet Commonly known as:  DIOVAN Take 1 Tab by mouth daily. Indications: hypertension VITAMIN D3 1,000 unit Cap Generic drug:  cholecalciferol Take 1,000 Units by mouth daily. Indications: PREVENTION OF VITAMIN D DEFICIENCY  
  
 ziprasidone 80 mg capsule Commonly known as:  GEODON  
daily as needed. * Notice: This list has 2 medication(s) that are the same as other medications prescribed for you. Read the directions carefully, and ask your doctor or other care provider to review them with you. Prescriptions Sent to Pharmacy Refills  
 valsartan (DIOVAN) 80 mg tablet 1 Sig: Take 1 Tab by mouth daily. Indications: hypertension Class: Normal  
 Pharmacy: [address] [place] [address] Ph #: [phone] Route: Oral  
 simvastatin (ZOCOR) 20 mg tablet 1 Sig: Take 1 Tab by mouth nightly. Class: Normal  
 Pharmacy: [address] [place] [address] Ph #: [phone] Route: Oral  
 dilTIAZem CD (CARDIZEM CD) 240 mg ER capsule 1 Sig: Take 1 Cap by mouth daily. Indications: hypertension Class: Normal  
 Pharmacy: [address] [place] [address] Ph #: [phone] Route: Oral  
  
Follow-up Instructions Return in about 3 months (around 11/29/2017), or if symptoms worsen or fail to improve, for post test.  
  
To-Do List   
 09/12/2017 9:00 AM  
  Appointment with [address] at [address] [place] [address] ([phone]) Patient Instructions After the recommended changes have been made in blood pressure medicines, patient advised to keep BP/HR(pulse rate) chart twice daily and bring us results in next 2 weeks or so. Patient may send the results via \"My Chart\" if desired. Please rest for 5-10 minutes before checking blood pressure Medications Discontinued During This Encounter Medication Reason  amLODIPine (NORVASC) 5 mg tablet Alternate Therapy  valsartan (DIOVAN) 160 mg tablet Reorder  simvastatin (ZOCOR) 20 mg tablet Reorder Orders Placed This Encounter  valsartan (DIOVAN) 80 mg tablet Sig: Take 1 Tab by mouth daily. Indications: hypertension Dispense:  90 Tab Refill:  1  simvastatin (ZOCOR) 20 mg tablet Sig: Take 1 Tab by mouth nightly. Dispense:  90 Tab Refill:  1  dilTIAZem CD (CARDIZEM CD) 240 mg ER capsule Sig: Take 1 Cap by mouth daily. Indications: hypertension Dispense:  30 Cap Refill:  1 Body Mass Index: Care Instructions Your Care Instructions Body mass index (BMI) can help you see if your weight is raising your risk for health problems. It uses a formula to compare how much you weigh with how tall you are. · A BMI lower than 18.5 is considered underweight. · A BMI between 18.5 and 24.9 is considered healthy. · A BMI between 25 and 29.9 is considered overweight. A BMI of 30 or higher is considered obese. If your BMI is in the normal range, it means that you have a lower risk for weight-related health problems. If your BMI is in the overweight or obese range, you may be at increased risk for weight-related health problems, such as high blood pressure, heart disease, stroke, arthritis or joint pain, and diabetes. If your BMI is in the underweight range, you may be at increased risk for health problems such as fatigue, lower protection (immunity) against illness, muscle loss, bone loss, hair loss, and hormone problems. BMI is just one measure of your risk for weight-related health problems. You may be at higher risk for health problems if you are not active, you eat an unhealthy diet, or you drink too much alcohol or use tobacco products. Follow-up care is a key part of your treatment and safety. Be sure to make and go to all appointments, and call your doctor if you are having problems. It's also a good idea to know your test results and keep a list of the medicines you take. How can you care for yourself at home? · Practice healthy eating habits. This includes eating plenty of fruits, vegetables, whole grains, lean protein, and low-fat dairy. · If your doctor recommends it, get more exercise. Walking is a good choice. Bit by bit, increase the amount you walk every day. Try for at least 30 minutes on most days of the week. · Do not smoke. Smoking can increase your risk for health problems. If you need help quitting, talk to your doctor about stop-smoking programs and medicines. These can increase your chances of quitting for good. · Limit alcohol to 2 drinks a day for men and 1 drink a day for women. Too much alcohol can cause health problems. If you have a BMI higher than 25 · Your doctor may do other tests to check your risk for weight-related health problems. This may include measuring the distance around your waist. A waist measurement of more than 40 inches in men or 35 inches in women can increase the risk of weight-related health problems. · Talk with your doctor about steps you can take to stay healthy or improve your health. You may need to make lifestyle changes to lose weight and stay healthy, such as changing your diet and getting regular exercise. If you have a BMI lower than 18.5 · Your doctor may do other tests to check your risk for health problems. · Talk with your doctor about steps you can take to stay healthy or improve your health.  You may need to make lifestyle changes to gain or maintain weight and stay healthy, such as getting more healthy foods in your diet and doing exercises to build muscle. Where can you learn more? Go to http://bailey-cristian.info/. Enter S176 in the search box to learn more about \"Body Mass Index: Care Instructions. \" Current as of: January 23, 2017 Content Version: 11.3 © 4152-8675 [employer]. Care instructions adapted under license by [employer] (which disclaims liability or warranty for this information). If you have questions about a medical condition or this instruction, always ask your healthcare professional. [address] any warranty or liability for your use of this information. Introducing [facility]! Russ Dominguez introduces "[employer] patient portal. Now you can access parts of your medical record, email your doctor's office, and request medication refills online. 1. In your internet browser, go to [email or web address] 2. Click on the First Time User? Click Here link in the Sign In box. You will see the New Member Sign Up page. 3. Enter your "[employer] Access Code exactly as it appears below. You will not need to use this code after youve completed the sign-up process. If you do not sign up before the expiration date, you must request a new code. · "[employer] Access Code: [email or web address] Expires: 8/30/2017 10:02 AM 
 
4. Enter the last four digits of your Social Security Number (xxxx) and Date of Birth (mm/dd/yyyy) as indicated and click Submit. You will be taken to the next sign-up page. 5. Create a Yabidut ID. This will be your "[employer] login ID and cannot be changed, so think of one that is secure and easy to remember. 6. Create a "[employer] password. You can change your password at any time. 7. Enter your Password Reset Question and Answer. This can be used at a later time if you forget your password. 8. Enter your e-mail address. You will receive e-mail notification when new information is available in [address] 9. Click Sign Up. You can now view and download portions of your medical record. 10. Click the Download Summary menu link to download a portable copy of your medical information. If you have questions, please visit the Frequently Asked Questions section of the [employer]ix website. Remember, [employer]ix is NOT to be used for urgent needs. For medical emergencies, dial 911. Now available from your iPhone and Android! Please provide this summary of care documentation to your next provider. Your primary care clinician is listed as RAMAKRISHNA GROSSMAN. If you have any questions after today's visit, please call [phone].

## 2017-08-29 NOTE — LETTER
Sadie Bourne 1960 
 
8/29/2017 Dear Lucie Orr MD 
 
I had the pleasure of evaluating  Mr. Sheyla Ramon in office today. Below are the relevant portions of my assessment and plan of care. ICD-10-CM ICD-9-CM 1. HTN (hypertension), benign I10 401.1 valsartan (DIOVAN) 80 mg tablet  
   dilTIAZem CD (CARDIZEM CD) 240 mg ER capsule 8/17 change norvasc to diltiazem CD for mild tachy also;  
  
2. Mixed hyperlipidemia E78.2 272.2 simvastatin (ZOCOR) 20 mg tablet 11/16 YII07, ; 5/16 [id number], ;   
3. Atherosclerosis of native coronary artery of native heart with angina pectoris ([facility]) I25.119 414.01   
  413.9 7/17 nl stress test  
4. Tobacco dependence F17.200 305.1 Patient advised to stop smoking to reduce future cardiovascular events. 5. Severe obesity (BMI >= 40) ([facility]) E66.01 278.01 Weight loss has been strongly encouraged by following dietary restrictions and an exercise routine. Current Outpatient Prescriptions Medication Sig Dispense Refill  valsartan (DIOVAN) 80 mg tablet Take 1 Tab by mouth daily. Indications: hypertension 90 Tab 1  
 simvastatin (ZOCOR) 20 mg tablet Take 1 Tab by mouth nightly. 90 Tab 1  
 dilTIAZem CD (CARDIZEM CD) 240 mg ER capsule Take 1 Cap by mouth daily. Indications: hypertension 30 Cap 1  cholecalciferol (VITAMIN D3) 1,000 unit cap Take 1,000 Units by mouth daily. Indications: PREVENTION OF VITAMIN D DEFICIENCY  montelukast (SINGULAIR) 10 mg tablet TAKE 1 TABLET BY MOUTH EVERY DAY  5  
 prazosin (MINIPRESS) 5 mg capsule Take 1 Cap by mouth nightly. 90 Cap 2  
 gabapentin (NEURONTIN) 300 mg capsule Take 1 Cap by mouth three (3) times daily. For nerve pain. 1 cap qhs for 1 wk, then increase to 1 cap BID for 1 wk, then increase to 1 cap TID 90 Cap 5  
 oxyCODONE-acetaminophen (PERCOCET)  mg per tablet Take 1 Tab by mouth four (4) times daily as needed for Pain for up to 30 days.  Max Daily Amount: 4 Tabs. for chronic, severe, refractory pain  Indications: Pain 120 Tab 0  
 diclofenac (VOLTAREN) 1 % gel Apply 2 g to affected area four (4) times daily. Apply to painful knees, shoulder, and wrist as needed 300 g 5  
 trifluoperazine (STELAZINE) 5 mg tablet TAKE 1 TABLET BY MOUTH AT BEDTIME TAKE WITH 10 MG TABLET = 15 MG TOTAL DOSE AT BEDTIME  2  
 trifluoperazine (STELAZINE) 10 mg tablet Indications: SCHIZOPHRENIA  2  
 NOVOLOG MIX 70-30 FLEXPEN 100 unit/mL (70-30) inpn   3  
 albuterol (PROVENTIL HFA, VENTOLIN HFA, PROAIR HFA) 90 mcg/actuation inhaler 2 Puffs.  ammonium lactate (LAC-HYDRIN) 12 % lotion  Azelastine (ASTEPRO) 0.15 % (205.5 mcg) nasal spray  escitalopram oxalate (LEXAPRO) 20 mg tablet Take 20 mg by mouth daily.  polyethylene glycol (MIRALAX) 17 gram/dose powder 17 g as needed.  ziprasidone (GEODON) 80 mg capsule daily as needed.  fluticasone (FLONASE) 50 mcg/actuation nasal spray Two squirts both nostrils at bedtime 1 Bottle 3  cyclobenzaprine (FLEXERIL) 5 mg tablet Take 5 mg by mouth three (3) times daily as needed for Muscle Spasm(s). Takes 1 to 2 tablets  topiramate (TOPAMAX) 200 mg tablet Take  by mouth two (2) times a day. Indications: MIGRAINE PREVENTION    
 traZODone (DESYREL) 150 mg tablet Take 150 mg by mouth nightly.  finasteride (PROSCAR) 5 mg tablet Take 1 Tab by mouth daily. Indications: SYMPTOMATIC BENIGN PROSTATIC HYPERPLASIA 90 Tab 3  
 fluticasone-salmeterol (ADVAIR) 250-50 mcg/dose diskus inhaler Take 1 Puff by inhalation every twelve (12) hours. Indications: INTRINSIC ASTHMA 1 Inhaler 6  
 tamsulosin (FLOMAX) 0.4 mg capsule Take 1 Cap by mouth daily. 30 Cap 3 Orders Placed This Encounter  valsartan (DIOVAN) 80 mg tablet Sig: Take 1 Tab by mouth daily. Indications: hypertension Dispense:  90 Tab Refill:  1  simvastatin (ZOCOR) 20 mg tablet Sig: Take 1 Tab by mouth nightly. Dispense:  90 Tab Refill:  1  dilTIAZem CD (CARDIZEM CD) 240 mg ER capsule Sig: Take 1 Cap by mouth daily. Indications: hypertension Dispense:  30 Cap Refill:  1 If you have questions, please do not hesitate to call me. I look forward to following  Rosie Gauthier along with you. Sincerely, Sophie Wolf MD

## 2017-08-29 NOTE — PATIENT INSTRUCTIONS
After the recommended changes have been made in blood pressure medicines, patient advised to keep BP/HR(pulse rate) chart twice daily and bring us results in next 2 weeks or so. Patient may send the results via \"My Chart\" if desired. Please rest for 5-10 minutes before checking blood pressure  Medications Discontinued During This Encounter   Medication Reason    amLODIPine (NORVASC) 5 mg tablet Alternate Therapy    valsartan (DIOVAN) 160 mg tablet Reorder    simvastatin (ZOCOR) 20 mg tablet Reorder       Orders Placed This Encounter    valsartan (DIOVAN) 80 mg tablet     Sig: Take 1 Tab by mouth daily. Indications: hypertension     Dispense:  90 Tab     Refill:  1    simvastatin (ZOCOR) 20 mg tablet     Sig: Take 1 Tab by mouth nightly. Dispense:  90 Tab     Refill:  1    dilTIAZem CD (CARDIZEM CD) 240 mg ER capsule     Sig: Take 1 Cap by mouth daily. Indications: hypertension     Dispense:  30 Cap     Refill:  1          Body Mass Index: Care Instructions  Your Care Instructions    Body mass index (BMI) can help you see if your weight is raising your risk for health problems. It uses a formula to compare how much you weigh with how tall you are. · A BMI lower than 18.5 is considered underweight. · A BMI between 18.5 and 24.9 is considered healthy. · A BMI between 25 and 29.9 is considered overweight. A BMI of 30 or higher is considered obese. If your BMI is in the normal range, it means that you have a lower risk for weight-related health problems. If your BMI is in the overweight or obese range, you may be at increased risk for weight-related health problems, such as high blood pressure, heart disease, stroke, arthritis or joint pain, and diabetes. If your BMI is in the underweight range, you may be at increased risk for health problems such as fatigue, lower protection (immunity) against illness, muscle loss, bone loss, hair loss, and hormone problems.   BMI is just one measure of your risk for weight-related health problems. You may be at higher risk for health problems if you are not active, you eat an unhealthy diet, or you drink too much alcohol or use tobacco products. Follow-up care is a key part of your treatment and safety. Be sure to make and go to all appointments, and call your doctor if you are having problems. It's also a good idea to know your test results and keep a list of the medicines you take. How can you care for yourself at home? · Practice healthy eating habits. This includes eating plenty of fruits, vegetables, whole grains, lean protein, and low-fat dairy. · If your doctor recommends it, get more exercise. Walking is a good choice. Bit by bit, increase the amount you walk every day. Try for at least 30 minutes on most days of the week. · Do not smoke. Smoking can increase your risk for health problems. If you need help quitting, talk to your doctor about stop-smoking programs and medicines. These can increase your chances of quitting for good. · Limit alcohol to 2 drinks a day for men and 1 drink a day for women. Too much alcohol can cause health problems. If you have a BMI higher than 25  · Your doctor may do other tests to check your risk for weight-related health problems. This may include measuring the distance around your waist. A waist measurement of more than 40 inches in men or 35 inches in women can increase the risk of weight-related health problems. · Talk with your doctor about steps you can take to stay healthy or improve your health. You may need to make lifestyle changes to lose weight and stay healthy, such as changing your diet and getting regular exercise. If you have a BMI lower than 18.5  · Your doctor may do other tests to check your risk for health problems. · Talk with your doctor about steps you can take to stay healthy or improve your health.  You may need to make lifestyle changes to gain or maintain weight and stay healthy, such as getting more healthy foods in your diet and doing exercises to build muscle. Where can you learn more? Go to http://bailey-cristian.info/. Enter S176 in the search box to learn more about \"Body Mass Index: Care Instructions. \"  Current as of: January 23, 2017  Content Version: 11.3  © 3960-7621 [employer]. Care instructions adapted under license by [employer] (which disclaims liability or warranty for this information). If you have questions about a medical condition or this instruction, always ask your healthcare professional. [address] any warranty or liability for your use of this information.

## 2017-08-30 DIAGNOSIS — E78.2 MIXED HYPERLIPIDEMIA: Primary | ICD-10-CM

## 2017-09-07 DIAGNOSIS — I10 HTN (HYPERTENSION), BENIGN: Primary | ICD-10-CM

## 2017-09-07 RX ORDER — ACETAMINOPHEN 500 MG
1 TABLET ORAL DAILY
Qty: 1 KIT | Refills: 0 | Status: SHIPPED | OUTPATIENT
Start: 2017-09-07 | End: 2017-09-07 | Stop reason: SDUPTHER

## 2017-09-07 RX ORDER — ACETAMINOPHEN 500 MG
1 TABLET ORAL DAILY
Qty: 1 KIT | Refills: 0 | Status: SHIPPED | OUTPATIENT
Start: 2017-09-07 | End: 2017-11-09

## 2017-09-11 ENCOUNTER — OFFICE VISIT (OUTPATIENT)
Dept: PAIN MANAGEMENT | Age: 57
End: 2017-09-11

## 2017-09-11 VITALS
BODY MASS INDEX: 40.73 KG/M2 | DIASTOLIC BLOOD PRESSURE: 83 MMHG | HEART RATE: 73 BPM | WEIGHT: 275 LBS | TEMPERATURE: 98.1 F | HEIGHT: 69 IN | RESPIRATION RATE: 18 BRPM | SYSTOLIC BLOOD PRESSURE: 150 MMHG

## 2017-09-11 DIAGNOSIS — M15.9 PRIMARY OSTEOARTHRITIS INVOLVING MULTIPLE JOINTS: Primary | ICD-10-CM

## 2017-09-11 DIAGNOSIS — M77.8 ENTHESOPATHY OF WRIST: ICD-10-CM

## 2017-09-11 DIAGNOSIS — Z79.899 ENCOUNTER FOR LONG-TERM (CURRENT) DRUG USE: ICD-10-CM

## 2017-09-11 DIAGNOSIS — M25.512 PAIN IN JOINT OF LEFT SHOULDER: ICD-10-CM

## 2017-09-11 DIAGNOSIS — G56.22 CUBITAL TUNNEL SYNDROME, LEFT: ICD-10-CM

## 2017-09-11 DIAGNOSIS — F45.42 PAIN DISORDER WITH PSYCHOLOGICAL COMPONENT: ICD-10-CM

## 2017-09-11 DIAGNOSIS — G89.4 CHRONIC PAIN SYNDROME: ICD-10-CM

## 2017-09-11 DIAGNOSIS — M51.37 DEGENERATION OF LUMBAR OR LUMBOSACRAL INTERVERTEBRAL DISC: ICD-10-CM

## 2017-09-11 DIAGNOSIS — M47.817 LUMBOSACRAL SPONDYLOSIS WITHOUT MYELOPATHY: ICD-10-CM

## 2017-09-11 DIAGNOSIS — M54.2 CERVICALGIA: ICD-10-CM

## 2017-09-11 DIAGNOSIS — M77.8: ICD-10-CM

## 2017-09-11 DIAGNOSIS — E66.01 MORBID OBESITY DUE TO EXCESS CALORIES (HCC): ICD-10-CM

## 2017-09-11 DIAGNOSIS — M54.16 LUMBAR NEURITIS: ICD-10-CM

## 2017-09-11 DIAGNOSIS — M54.42 MIDLINE LOW BACK PAIN WITH LEFT-SIDED SCIATICA, UNSPECIFIED CHRONICITY: ICD-10-CM

## 2017-09-11 DIAGNOSIS — Z79.899 ENCOUNTER FOR LONG-TERM (CURRENT) USE OF HIGH-RISK MEDICATION: ICD-10-CM

## 2017-09-11 DIAGNOSIS — M54.12 CERVICAL RADICULOPATHY: ICD-10-CM

## 2017-09-11 LAB
ALCOHOL UR POC: NORMAL
AMPHETAMINES UR POC: NORMAL
BARBITURATES UR POC: NORMAL
BENZODIAZEPINES UR POC: NORMAL
BUPRENORPHINE UR POC: NORMAL
CANNABINOIDS UR POC: NORMAL
CARISOPRODOL UR POC: NORMAL
COCAINE UR POC: NORMAL
FENTANYL UR POC: NORMAL
MDMA/ECSTASY UR POC: NORMAL
METHADONE UR POC: NORMAL
METHAMPHETAMINE UR POC: NORMAL
METHYLPHENIDATE UR POC: NORMAL
OPIATES UR POC: NORMAL
OXYCODONE UR POC: NORMAL
PHENCYCLIDINE UR POC: NORMAL
PROPOXYPHENE UR POC: NORMAL
TRAMADOL UR POC: NORMAL
TRICYCLICS UR POC: NORMAL

## 2017-09-11 RX ORDER — GABAPENTIN 300 MG/1
300 CAPSULE ORAL 3 TIMES DAILY
Qty: 90 CAP | Refills: 5 | Status: SHIPPED | OUTPATIENT
Start: 2017-09-11

## 2017-09-11 RX ORDER — OXYCODONE AND ACETAMINOPHEN 10; 325 MG/1; MG/1
1 TABLET ORAL
Qty: 120 TAB | Refills: 0 | Status: SHIPPED | OUTPATIENT
Start: 2017-09-14 | End: 2017-09-11 | Stop reason: SDUPTHER

## 2017-09-11 RX ORDER — OXYCODONE AND ACETAMINOPHEN 10; 325 MG/1; MG/1
1 TABLET ORAL
Qty: 120 TAB | Refills: 0 | Status: SHIPPED | OUTPATIENT
Start: 2017-12-10 | End: 2017-11-09

## 2017-09-11 RX ORDER — OXYCODONE AND ACETAMINOPHEN 10; 325 MG/1; MG/1
1 TABLET ORAL
Qty: 120 TAB | Refills: 0 | Status: SHIPPED | OUTPATIENT
Start: 2017-10-13 | End: 2017-09-11 | Stop reason: SDUPTHER

## 2017-09-11 RX ORDER — OXYCODONE AND ACETAMINOPHEN 10; 325 MG/1; MG/1
1 TABLET ORAL
Qty: 120 TAB | Refills: 0 | Status: SHIPPED | OUTPATIENT
Start: 2017-11-11 | End: 2017-09-11 | Stop reason: SDUPTHER

## 2017-09-11 NOTE — MR AVS SNAPSHOT
Visit Information Date & Time Provider Department Dept. Phone Encounter #  
 9/11/2017  9:20 AM Jose A Lindsay PA-C [facility] [phone] [id number] Follow-up Instructions Return in about 4 months (around 1/11/2018). Follow-up and Disposition History Your Appointments 9/28/2017  9:30 AM  
Office Visit with Shayy Porter MD  
[facility]) Appt Note: checkup [address] [facility]. [place] [address] [id number]  
[phone] [address] [id number]  
  
    
 11/9/2017 11:00 AM  
ESTABLISHED PATIENT with Saravanan Jiang MD  
[facility]) Appt Note: 3 months [address] [facility], [address] [place] [id number] [address] [place] [address], [address] [place] [address] Upcoming Health Maintenance Date Due Hepatitis C Screening 1960 HEMOGLOBIN A1C Q6M 1960 FOOT EXAM Q1 3/19/1969 MICROALBUMIN Q1 3/19/1969 EYE EXAM RETINAL OR DILATED Q1 3/19/1969 Pneumococcal 19-64 Medium Risk (1 of 1 - PPSV23) 3/19/1978 DTaP/Tdap/Td series (1 - Tdap) 3/19/1980 COLONOSCOPY 3/19/2014 LIPID PANEL Q1 5/16/2017 INFLUENZA AGE 9 TO ADULT 8/1/2017 Allergies as of 9/11/2017  Review Complete On: 9/11/2017 By: Kaleb Garcia LPN Severity Noted Reaction Type Reactions Seafood Medium 04/01/2015   Systemic Hives \"deviled-crabs\"  Does fine with all other seafood Aspirin  03/05/2014   Systemic Hives Other reaction(s): facial swelling Tomato  04/28/2014   Systemic Hives Current Immunizations  Never Reviewed Name Date Influenza Vaccine 11/11/2014 Not reviewed this visit You Were Diagnosed With   
  
 Codes Comments Primary osteoarthritis involving multiple joints    -  Primary ICD-10-CM: M15.0 ICD-9-CM: 715.09   
 Cubital tunnel syndrome, left     ICD-10-CM: I24.00 
ICD-9-CM: 354.2 Cervical radiculopathy     ICD-10-CM: M54.12 
ICD-9-CM: 723.4 Lumbar neuritis     ICD-10-CM: M54.16 
ICD-9-CM: 724.4 Cervicalgia     ICD-10-CM: M54.2 ICD-9-CM: 723.1 Chronic pain syndrome     ICD-10-CM: G89.4 ICD-9-CM: 338.4 Morbid obesity due to excess calories (HCC)     ICD-10-CM: E66.01 
ICD-9-CM: 278.01 Pain disorder with psychological component     ICD-10-CM: F45.41 
ICD-9-CM: 307.80 Encounter for long-term (current) drug use     ICD-10-CM: Z79.899 ICD-9-CM: V58.69 Pain in joint of left shoulder     ICD-10-CM: M25.512 ICD-9-CM: 719.41 Lumbosacral spondylosis without myelopathy     ICD-10-CM: B17.929 ICD-9-CM: 721.3 Degeneration of lumbar or lumbosacral intervertebral disc     ICD-10-CM: M51.37 
ICD-9-CM: 722.52 Midline low back pain with left-sided sciatica, unspecified chronicity     ICD-10-CM: M54.42 
ICD-9-CM: 724.3 Enthesopathy of wrist     ICD-10-CM: M77.8 ICD-9-CM: 726.4 Shoulder enthesopathy, left     ICD-10-CM: M75.82 ICD-9-CM: 726.10 Vitals BP Pulse Temp Resp Height(growth percentile) Weight(growth percentile) 150/83 73 98.1 °F (36.7 °C) 18 5' 9\" (1.753 m) 275 lb (124.7 kg) BMI Smoking Status 40.61 kg/m2 Current Every Day Smoker Vitals History BMI and BSA Data Body Mass Index Body Surface Area  
 40.61 kg/m 2 2.46 m 2 Preferred Pharmacy Pharmacy Name Phone [facility] [address] [place] [address] [phone] Your Updated Medication List  
  
   
This list is accurate as of: 9/11/17 10:20 AM.  Always use your most recent med list.  
  
  
  
  
 albuterol 90 mcg/actuation inhaler Commonly known as:  PROVENTIL HFA, VENTOLIN HFA, PROAIR HFA  
2 Puffs. ammonium lactate 12 % lotion Commonly known as:  LAC-HYDRIN Azelastine 0.15 % (205.5 mcg) nasal spray Commonly known as:  ASTEPRO Blood Pressure Monitor Kit 1 Device by Does Not Apply route daily. cyclobenzaprine 5 mg tablet Commonly known as:  FLEXERIL Take 5 mg by mouth three (3) times daily as needed for Muscle Spasm(s). Takes 1 to 2 tablets  
  
 diclofenac 1 % Gel Commonly known as:  VOLTAREN Apply 2 g to affected area four (4) times daily. Apply to painful knees, shoulder, and wrist as needed  
  
 dilTIAZem  mg ER capsule Commonly known as:  CARDIZEM CD Take 1 Cap by mouth daily. Indications: hypertension  
  
 escitalopram oxalate 20 mg tablet Commonly known as:  Celesta Prost Take 20 mg by mouth daily. finasteride 5 mg tablet Commonly known as:  PROSCAR Take 1 Tab by mouth daily. Indications: SYMPTOMATIC BENIGN PROSTATIC HYPERPLASIA  
  
 fluticasone 50 mcg/actuation nasal spray Commonly known as:  Breanne Mealy Two squirts both nostrils at bedtime  
  
 fluticasone-salmeterol 250-50 mcg/dose diskus inhaler Commonly known as:  ADVAIR Take 1 Puff by inhalation every twelve (12) hours. Indications: INTRINSIC ASTHMA  
  
 gabapentin 300 mg capsule Commonly known as:  NEURONTIN Take 1 Cap by mouth three (3) times daily. For nerve pain. 1 cap qhs for 1 wk, then increase to 1 cap BID for 1 wk, then increase to 1 cap TID  
  
 montelukast 10 mg tablet Commonly known as:  SINGULAIR  
TAKE 1 TABLET BY MOUTH EVERY DAY NovoLOG Mix 70-30 FlexPen 100 unit/mL (70-30) Inpn Generic drug:  insulin aspart protamine/insulin aspart  
  
 oxyCODONE-acetaminophen  mg per tablet Commonly known as:  PERCOCET Take 1 Tab by mouth four (4) times daily as needed for Pain for up to 30 days. Max Daily Amount: 4 Tabs. for chronic, severe, refractory pain  Indications: Pain Start taking on:  12/10/2017 polyethylene glycol 17 gram/dose powder Commonly known as:  Orysia [place] 17 g as needed. prazosin 5 mg capsule Commonly known as:  MINIPRESS  
 Take 1 Cap by mouth nightly. simvastatin 20 mg tablet Commonly known as:  ZOCOR Take 1 Tab by mouth nightly. tamsulosin 0.4 mg capsule Commonly known as:  FLOMAX Take 1 Cap by mouth daily. topiramate 200 mg tablet Commonly known as:  TOPAMAX Take  by mouth two (2) times a day. Indications: MIGRAINE PREVENTION  
  
 traZODone 150 mg tablet Commonly known as:  Noreene David Take 150 mg by mouth nightly. * trifluoperazine 10 mg tablet Commonly known as:  STELAZINE Indications: SCHIZOPHRENIA  
  
 * trifluoperazine 5 mg tablet Commonly known as:  STELAZINE  
TAKE 1 TABLET BY MOUTH AT BEDTIME TAKE WITH 10 MG TABLET = 15 MG TOTAL DOSE AT BEDTIME  
  
 valsartan 80 mg tablet Commonly known as:  DIOVAN Take 1 Tab by mouth daily. Indications: hypertension VITAMIN D3 1,000 unit Cap Generic drug:  cholecalciferol Take 1,000 Units by mouth daily. Indications: PREVENTION OF VITAMIN D DEFICIENCY  
  
 ziprasidone 80 mg capsule Commonly known as:  GEODON  
daily as needed. * Notice: This list has 2 medication(s) that are the same as other medications prescribed for you. Read the directions carefully, and ask your doctor or other care provider to review them with you. Prescriptions Printed Refills  
 gabapentin (NEURONTIN) 300 mg capsule 5 Sig: Take 1 Cap by mouth three (3) times daily. For nerve pain. 1 cap qhs for 1 wk, then increase to 1 cap BID for 1 wk, then increase to 1 cap TID Class: Print Route: Oral  
 oxyCODONE-acetaminophen (PERCOCET)  mg per tablet 0 Starting on: 12/10/2017 Sig: Take 1 Tab by mouth four (4) times daily as needed for Pain for up to 30 days. Max Daily Amount: 4 Tabs. for chronic, severe, refractory pain  Indications: Pain Class: Print Route: Oral  
  
Follow-up Instructions Return in about 4 months (around 1/11/2018). To-Do List   
 09/12/2017 9:00 AM  
 Appointment with [address] at [address] [place] [address] ([phone]) Patient Instructions Plan: 
Continue same medications as prescribed for chronic pain Dr. Imelda Mcduffie may prescribe any pain medications deemed necessary to take postoperatively. These may be taken safely WITH our medications as needed. Follow up in 4 months or sooner if needed Regular exercise and attention to emotional health and diet remain the most effective ways to treat chronic pain of all kinds You may contact me with questions or concerns through [address] Introducing \[facility] [place] [facility]\"" & HEALTH SERVICES! Cindy Lee introduces [employer] patient portal. Now you can access parts of your medical record, email your doctor's office, and request medication refills online. 1. In your internet browser, go to [email or web address] 2. Click on the First Time User? Click Here link in the Sign In box. You will see the New Member Sign Up page. 3. Enter your [employer] Access Code exactly as it appears below. You will not need to use this code after youve completed the sign-up process. If you do not sign up before the expiration date, you must request a new code. · [employer] Access Code: [email or web address] Expires: 12/10/2017 10:20 AM 
 
4. Enter the last four digits of your Social Security Number (xxxx) and Date of Birth (mm/dd/yyyy) as indicated and click Submit. You will be taken to the next sign-up page. 5. Create a [employer] ID. This will be your [employer] login ID and cannot be changed, so think of one that is secure and easy to remember. 6. Create a [employer] password. You can change your password at any time. 7. Enter your Password Reset Question and Answer. This can be used at a later time if you forget your password. 8. Enter your e-mail address. You will receive e-mail notification when new information is available in [address] 9. Click Sign Up. You can now view and download portions of your medical record. 10. Click the Download Summary menu link to download a portable copy of your medical information. If you have questions, please visit the Frequently Asked Questions section of the [employer] website. Remember, [employer] is NOT to be used for urgent needs. For medical emergencies, dial 911. Now available from your iPhone and Android! Please provide this summary of care documentation to your next provider. Your primary care clinician is listed as RAMAKRISHNA GROSSMAN. If you have any questions after today's visit, please call [phone].

## 2017-09-11 NOTE — PROGRESS NOTES
Nursing Notes    Patient presents to the office today in follow-up. Patient rates his pain at 8/10 on the numerical pain scale. Reviewed medications with counts as follows:    Rx Date filled Qty Dispensed Pill Count Last Dose Short   Percocet 10/325 mg 08/16/17 120 19 today no                                   POC UDS was performed in office today. Any new labs or imaging since last appointment? NO    Have you been to an emergency room (ER) or urgent care clinic since your last visit? NO            Have you been hospitalized since your last visit? NO     If yes, where, when, and reason for visit? Have you seen or consulted any other health care providers outside of the [address] [place] [address]  since your last visit? YES     If yes, where, when, and reason for visit? Urology, pcp    . HM deferred to pcp.
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
## 2017-09-15 ENCOUNTER — TELEPHONE (OUTPATIENT)
Dept: PAIN MANAGEMENT | Age: 57
End: 2017-09-15

## 2017-09-20 ENCOUNTER — TELEPHONE (OUTPATIENT)
Dept: CARDIOLOGY CLINIC | Age: 57
End: 2017-09-20

## 2017-09-20 NOTE — TELEPHONE ENCOUNTER
Per Dr Jay Whitt from paper message to contact patient to see if patient is having muscle pain and if so hold zocor for now and show Dr Lamont Bray. Left message on patients machine to call the office to verify muscle pain.

## 2017-09-21 NOTE — TELEPHONE ENCOUNTER
Called and spoke with patient concerning muscle pain, patient states having some muscle pain. Per Dr. Chuyita Mera hold zocor for now and show Dr. Dangelo Finn. Lab in folder for Dr. Dangelo Finn to advise.

## 2017-09-22 DIAGNOSIS — I10 HTN (HYPERTENSION), BENIGN: ICD-10-CM

## 2017-09-22 RX ORDER — PRAZOSIN HYDROCHLORIDE 5 MG/1
5 CAPSULE ORAL
Qty: 90 CAP | Refills: 3 | Status: SHIPPED | OUTPATIENT
Start: 2017-09-22 | End: 2018-10-23 | Stop reason: SDUPTHER

## 2017-09-22 RX ORDER — PRAVASTATIN SODIUM 40 MG/1
40 TABLET ORAL
COMMUNITY
End: 2021-03-11 | Stop reason: SDUPTHER

## 2017-09-22 RX ORDER — DILTIAZEM HYDROCHLORIDE 240 MG/1
240 CAPSULE, COATED, EXTENDED RELEASE ORAL DAILY
Qty: 90 CAP | Refills: 3 | Status: SHIPPED | OUTPATIENT
Start: 2017-09-22 | End: 2017-11-03 | Stop reason: ALTCHOICE

## 2017-09-22 NOTE — TELEPHONE ENCOUNTER
Patient called back stating that he is not taking Zocor he is taking Pravastatin instead. Patient is still having a little muscle cramps. Please Advise.

## 2017-09-26 ENCOUNTER — TELEPHONE (OUTPATIENT)
Dept: CARDIOLOGY CLINIC | Age: 57
End: 2017-09-26

## 2017-09-26 NOTE — TELEPHONE ENCOUNTER
Patient takes Oxycodone for chronic pain and the pharmacy states there are contraindications with Diltiazem, please advise

## 2017-09-27 NOTE — TELEPHONE ENCOUNTER
Called and advised patient to continue Diltiazem and contact prescribing Physician for Oxycodone to decrease dose. Patient states understanding.

## 2017-10-02 ENCOUNTER — TELEPHONE (OUTPATIENT)
Dept: CARDIOLOGY CLINIC | Age: 57
End: 2017-10-02

## 2017-10-02 DIAGNOSIS — I25.119 ATHEROSCLEROSIS OF NATIVE CORONARY ARTERY WITH ANGINA PECTORIS, UNSPECIFIED WHETHER NATIVE OR TRANSPLANTED HEART (HCC): Primary | ICD-10-CM

## 2017-10-02 NOTE — TELEPHONE ENCOUNTER
Per Dr. Jessica Chi to call patient to see how his muscle cramping is. Per Larissa KEY (paper message)    Patient states that since stopping pravastatin his muscle cramping is getting better. He states that he still has some cramping no where near as bad.

## 2017-10-03 NOTE — TELEPHONE ENCOUNTER
Called and spoke with patient its okay to hold statins for now. Repeat lipids in 6 weeks. He voices understanding and acceptance of this advice and will call back if any further questions or concerns. Lab slip mailed to patient.

## 2017-10-06 DIAGNOSIS — I10 HTN (HYPERTENSION), BENIGN: ICD-10-CM

## 2017-10-06 RX ORDER — VALSARTAN 80 MG/1
80 TABLET ORAL DAILY
Qty: 90 TAB | Refills: 1 | Status: SHIPPED | OUTPATIENT
Start: 2017-10-06 | End: 2017-11-03 | Stop reason: SDUPTHER

## 2017-10-18 ENCOUNTER — TELEPHONE (OUTPATIENT)
Dept: PAIN MANAGEMENT | Age: 57
End: 2017-10-18

## 2017-10-18 NOTE — TELEPHONE ENCOUNTER
Patient called the office requesting a call back. I called the patient and there was no answer I left a message for him to call us back. I provided the phone number for the patient.

## 2017-10-22 DIAGNOSIS — I10 HTN (HYPERTENSION), BENIGN: ICD-10-CM

## 2017-10-24 RX ORDER — DILTIAZEM HYDROCHLORIDE 240 MG/1
CAPSULE, EXTENDED RELEASE ORAL
Qty: 30 CAP | Refills: 1 | Status: SHIPPED | OUTPATIENT
Start: 2017-10-24 | End: 2017-10-27 | Stop reason: ALTCHOICE

## 2017-10-27 ENCOUNTER — TELEPHONE (OUTPATIENT)
Dept: CARDIOLOGY CLINIC | Age: 57
End: 2017-10-27

## 2017-10-27 NOTE — TELEPHONE ENCOUNTER
Mr Maribell Sellers came into the office today to discuss his medication. He believes ever since he started diltiazem it has negative effects on his body, such as \"muscle spasms\" and \"bone cracking\". Mr Maribell Sellers states he is still taking the medication as prescribed, but would like to be changed to something different. Please advise.

## 2017-10-27 NOTE — TELEPHONE ENCOUNTER
Spoke to patient on the phone.  He will d/c diltiazem as of today and take amlodpine 5mg    Patient agreed to take blood pressure and HR everyday and call me with the reading on Friday

## 2017-11-03 ENCOUNTER — TELEPHONE (OUTPATIENT)
Dept: CARDIOLOGY CLINIC | Age: 57
End: 2017-11-03

## 2017-11-03 DIAGNOSIS — I10 HTN (HYPERTENSION), BENIGN: ICD-10-CM

## 2017-11-03 DIAGNOSIS — I10 HTN (HYPERTENSION), BENIGN: Primary | ICD-10-CM

## 2017-11-03 RX ORDER — AMLODIPINE BESYLATE 5 MG/1
5 TABLET ORAL DAILY
COMMUNITY
End: 2017-11-03 | Stop reason: SDUPTHER

## 2017-11-03 RX ORDER — VALSARTAN 160 MG/1
160 TABLET ORAL DAILY
Qty: 90 TAB | Refills: 1 | Status: SHIPPED | OUTPATIENT
Start: 2017-11-03 | End: 2018-05-07

## 2017-11-03 RX ORDER — AMLODIPINE BESYLATE 10 MG/1
10 TABLET ORAL DAILY
Qty: 90 TAB | Refills: 1 | Status: SHIPPED | OUTPATIENT
Start: 2017-11-03 | End: 2019-07-17 | Stop reason: SDUPTHER

## 2017-11-03 NOTE — TELEPHONE ENCOUNTER
Increase Norvasc to 10 and Diovan to 160 mg daily.   I sent in the prescriptions to [facility] pharmacy

## 2017-11-03 NOTE — TELEPHONE ENCOUNTER
Spoke with patient regarding BP, patient is to take Norvasc 10 mg and Diovan to 160 mg daily, prescriptions was sent to [facility] pharmacy. He voices understanding and acceptance of this advice and will call back if any further questions or concerns.

## 2017-11-03 NOTE — TELEPHONE ENCOUNTER
10/28/17 BP-188/94  10/29/17 BP-156/89  10/30/17 BP-159/82  10/31/17 BP-159/79  11/1/17 BP-152/90  11/2/17 BP- 166/88

## 2017-11-09 ENCOUNTER — OFFICE VISIT (OUTPATIENT)
Dept: CARDIOLOGY CLINIC | Age: 57
End: 2017-11-09

## 2017-11-09 VITALS
HEIGHT: 69 IN | SYSTOLIC BLOOD PRESSURE: 149 MMHG | BODY MASS INDEX: 40.73 KG/M2 | HEART RATE: 78 BPM | DIASTOLIC BLOOD PRESSURE: 90 MMHG | WEIGHT: 275 LBS

## 2017-11-09 DIAGNOSIS — F17.200 TOBACCO DEPENDENCE: ICD-10-CM

## 2017-11-09 DIAGNOSIS — I82.401 ACUTE DEEP VEIN THROMBOSIS (DVT) OF RIGHT LOWER EXTREMITY, UNSPECIFIED VEIN (HCC): ICD-10-CM

## 2017-11-09 DIAGNOSIS — E78.2 MIXED HYPERLIPIDEMIA: Primary | ICD-10-CM

## 2017-11-09 DIAGNOSIS — I10 HTN (HYPERTENSION), BENIGN: ICD-10-CM

## 2017-11-09 RX ORDER — HYDROCHLOROTHIAZIDE 12.5 MG/1
12.5 TABLET ORAL DAILY
Qty: 30 TAB | Refills: 3 | Status: SHIPPED | OUTPATIENT
Start: 2017-11-09 | End: 2018-02-01 | Stop reason: SDUPTHER

## 2017-11-09 NOTE — LETTER
Juliann Found 1960 
 
11/9/2017 Dear MD Tess Silva MD Anthon Rusk, MD 
 
I had the pleasure of evaluating  Mr. Sage Lyon in office today. Below are the relevant portions of my assessment and plan of care. ICD-10-CM ICD-9-CM 1. Mixed hyperlipidemia E78.2 272.2   
 11/16 LDL67,  
2. HTN (hypertension), benign I10 401.1 hydroCHLOROthiazide (HYDRODIURIL) 12.5 mg tablet METABOLIC PANEL, BASIC  
 57/18 add hctz; 3. Tobacco dependence F17.200 305.1 Patient advised to stop smoking to reduce future cardiovascular events. 4. Acute deep vein thrombosis (DVT) of right lower extremity, unspecified vein (HCC) I82.401 453.40 rivaroxaban (XARELTO) 15 mg tab tablet Recurrent in 10/17- Rx per PCP 
pending w/u with [facility] Current Outpatient Prescriptions Medication Sig Dispense Refill  rivaroxaban (XARELTO) 15 mg tab tablet Take 15 mg by mouth two (2) times daily (with meals).  hydroCHLOROthiazide (HYDRODIURIL) 12.5 mg tablet Take 1 Tab by mouth daily. 30 Tab 3  
 amLODIPine (NORVASC) 10 mg tablet Take 1 Tab by mouth daily. 90 Tab 1  valsartan (DIOVAN) 160 mg tablet Take 1 Tab by mouth daily. Indications: hypertension 90 Tab 1  pravastatin (PRAVACHOL) 40 mg tablet Take 40 mg by mouth nightly.  prazosin (MINIPRESS) 5 mg capsule Take 1 Cap by mouth nightly. 90 Cap 3  
 oxyCODONE-acetaminophen (PERCOCET) 5-325 mg per tablet Take 1 Tab by mouth every four (4) hours as needed for Pain. Max Daily Amount: 6 Tabs. 12 Tab 0  
 gabapentin (NEURONTIN) 300 mg capsule Take 1 Cap by mouth three (3) times daily. For nerve pain. 1 cap qhs for 1 wk, then increase to 1 cap BID for 1 wk, then increase to 1 cap TID 90 Cap 5  
 montelukast (SINGULAIR) 10 mg tablet TAKE 1 TABLET BY MOUTH EVERY DAY  5  
 diclofenac (VOLTAREN) 1 % gel Apply 2 g to affected area four (4) times daily.  Apply to painful knees, shoulder, and wrist as needed 300 g 5  
  trifluoperazine (STELAZINE) 5 mg tablet TAKE 1 TABLET BY MOUTH AT BEDTIME TAKE WITH 10 MG TABLET = 15 MG TOTAL DOSE AT BEDTIME  2  
 trifluoperazine (STELAZINE) 10 mg tablet 10 mg nightly. Indications: SCHIZOPHRENIA  2  
 NOVOLOG MIX 70-30 FLEXPEN 100 unit/mL (70-30) inpn Indications: pt adjusts daily  3  
 albuterol (PROVENTIL HFA, VENTOLIN HFA, PROAIR HFA) 90 mcg/actuation inhaler 2 Puffs every four (4) hours as needed.  ammonium lactate (LAC-HYDRIN) 12 % lotion Apply  to affected area as needed (feet).  Azelastine (ASTEPRO) 0.15 % (205.5 mcg) nasal spray 1 [place] by Both Nostrils route.  escitalopram oxalate (LEXAPRO) 20 mg tablet Take 20 mg by mouth daily.  polyethylene glycol (MIRALAX) 17 gram/dose powder 17 g as needed.  ziprasidone (GEODON) 80 mg capsule daily as needed.  tamsulosin (FLOMAX) 0.4 mg capsule Take 1 Cap by mouth daily. 30 Cap 3  
 fluticasone (FLONASE) 50 mcg/actuation nasal spray Two squirts both nostrils at bedtime 1 Bottle 3  cyclobenzaprine (FLEXERIL) 5 mg tablet Take 5 mg by mouth three (3) times daily as needed for Muscle Spasm(s). Takes 1 to 2 tablets  topiramate (TOPAMAX) 200 mg tablet Take  by mouth two (2) times a day. Indications: MIGRAINE PREVENTION    
 traZODone (DESYREL) 150 mg tablet Take 150 mg by mouth nightly. Orders Placed This Encounter  METABOLIC PANEL, BASIC Standing Status:   Future Standing Expiration Date:   2/9/2018  rivaroxaban (XARELTO) 15 mg tab tablet Sig: Take 15 mg by mouth two (2) times daily (with meals).  hydroCHLOROthiazide (HYDRODIURIL) 12.5 mg tablet Sig: Take 1 Tab by mouth daily. Dispense:  30 Tab Refill:  3 If you have questions, please do not hesitate to call me. I look forward to following Mr. Elsa Melo along with you. Sincerely, Asher Davison MD

## 2017-11-09 NOTE — PROGRESS NOTES
HISTORY OF PRESENT ILLNESS  Roya Jacques is a 61 y.o. male. Hypertension   The history is provided by the patient and medical records. This is a chronic problem. Pertinent negatives include no chest pain, no headaches and no shortness of breath. Cholesterol Problem   The history is provided by the medical records. This is a chronic problem. Pertinent negatives include no chest pain, no headaches and no shortness of breath. Hypotension   The history is provided by the medical records and patient (intraop in 7/17; TURP cancelled). Pertinent negatives include no chest pain, no headaches and no shortness of breath. Leg Swelling   The history is provided by the patient. This is a new problem. The current episode started more than 1 week ago (9/16). The problem occurs every several days. The problem has been resolved. Pertinent negatives include no chest pain, no headaches and no shortness of breath. The symptoms are aggravated by standing. The symptoms are relieved by sleep. Dizziness   The history is provided by the patient. This is a recurrent problem. The current episode started more than 1 week ago. The problem occurs rarely. Pertinent negatives include no chest pain, no headaches and no shortness of breath. The symptoms are aggravated by standing. Review of Systems   Constitutional: Negative for chills, diaphoresis, fever, malaise/fatigue and weight loss. HENT: Negative for nosebleeds. Eyes: Negative for discharge. Respiratory: Negative for cough, shortness of breath and wheezing. Cardiovascular: Negative for chest pain, palpitations, orthopnea, claudication, leg swelling and PND. Gastrointestinal: Negative for diarrhea, nausea and vomiting. Genitourinary: Negative for dysuria and hematuria. Musculoskeletal: Negative for joint pain. Skin: Negative for rash. Neurological: Positive for dizziness. Negative for seizures, loss of consciousness and headaches.    Endo/Heme/Allergies: Negative for polydipsia. Does not bruise/bleed easily. Psychiatric/Behavioral: Negative for depression and substance abuse. The patient does not have insomnia. Allergies   Allergen Reactions    Seafood Hives     \"deviled-crabs\"  Does fine with all other seafood    Aspirin Hives     Other reaction(s): facial swelling     Tomato Hives       Past Medical History:   Diagnosis Date    Adverse effect of anesthesia     sx cx due to blood pressure drop    Allergic rhinitis     Asthma     Atherosclerosis of native coronary artery with angina pectoris ([address] 5/14/2015    atypical angina, mild stress abnormality may be artifact nl EF by echo     BPH with obstruction/lower urinary tract symptoms     Bulbous urethral stricture     Chronic epididymitis     Chronic pain     shoulder, neck, knee    Coronary artery disease involving native coronary artery of native heart with angina pectoris ([address] 5/13/2016    chr atypical CP for few seconds only     Diabetes mellitus ([address]     DM (diabetes mellitus) ([address]     DVT (deep venous thrombosis) ([address] 2013    left leg    Emphysema     Essential hypertension     GERD (gastroesophageal reflux disease)     High cholesterol     Hypertension     Impotence     Incontinence     Intermittent urinary stream     Morbid obesity ([address] 4/1/2015    Multiple trauma     LEFT SHOULDER, LEFT WRIST, NECK, AND BACK    Other and unspecified hyperlipidemia 4/1/2015    Paranoid schizophrenia ([address]     Preop cardiovascular exam 11/13/2015    wrist surgery; no significant CP; abnl stress ? artifact cleared with mild risk periop     Sciatica     Severe obesity (BMI >= 40) [facility] 11/13/2015    Sinusitis     Spermatocele of epididymis     Type II or unspecified type diabetes mellitus without mention of complication, not stated as uncontrolled 4/1/2015    Urinary urgency     Weak urinary stream        Family History   Problem Relation Age of Onset    Stroke Mother 79    Heart Disease Mother     Diabetes Father     Cancer Brother     Heart Attack Neg Hx        Social History   Substance Use Topics    Smoking status: Current Every Day Smoker     Packs/day: 0.25     Years: 32.00     Types: Cigarettes     Start date: 8/1/1976    Smokeless tobacco: Former User      Comment: per patient stated 1 or 2 a day ready to stop    Alcohol use No        Current Outpatient Prescriptions   Medication Sig    rivaroxaban (XARELTO) 15 mg tab tablet Take 15 mg by mouth two (2) times daily (with meals).  amLODIPine (NORVASC) 10 mg tablet Take 1 Tab by mouth daily.  valsartan (DIOVAN) 160 mg tablet Take 1 Tab by mouth daily. Indications: hypertension    pravastatin (PRAVACHOL) 40 mg tablet Take 40 mg by mouth nightly.  prazosin (MINIPRESS) 5 mg capsule Take 1 Cap by mouth nightly.  oxyCODONE-acetaminophen (PERCOCET) 5-325 mg per tablet Take 1 Tab by mouth every four (4) hours as needed for Pain. Max Daily Amount: 6 Tabs.  gabapentin (NEURONTIN) 300 mg capsule Take 1 Cap by mouth three (3) times daily. For nerve pain. 1 cap qhs for 1 wk, then increase to 1 cap BID for 1 wk, then increase to 1 cap TID    montelukast (SINGULAIR) 10 mg tablet TAKE 1 TABLET BY MOUTH EVERY DAY    diclofenac (VOLTAREN) 1 % gel Apply 2 g to affected area four (4) times daily. Apply to painful knees, shoulder, and wrist as needed    trifluoperazine (STELAZINE) 5 mg tablet TAKE 1 TABLET BY MOUTH AT BEDTIME TAKE WITH 10 MG TABLET = 15 MG TOTAL DOSE AT BEDTIME    trifluoperazine (STELAZINE) 10 mg tablet 10 mg nightly. Indications: SCHIZOPHRENIA    NOVOLOG MIX 70-30 FLEXPEN 100 unit/mL (70-30) inpn Indications: pt adjusts daily    albuterol (PROVENTIL HFA, VENTOLIN HFA, PROAIR HFA) 90 mcg/actuation inhaler 2 Puffs every four (4) hours as needed.  ammonium lactate (LAC-HYDRIN) 12 % lotion Apply  to affected area as needed (feet).     Azelastine (ASTEPRO) 0.15 % (205.5 mcg) nasal spray 1 [place] by Both Nostrils route.    escitalopram oxalate (LEXAPRO) 20 mg tablet Take 20 mg by mouth daily.  polyethylene glycol (MIRALAX) 17 gram/dose powder 17 g as needed.  ziprasidone (GEODON) 80 mg capsule daily as needed.  tamsulosin (FLOMAX) 0.4 mg capsule Take 1 Cap by mouth daily.  fluticasone (FLONASE) 50 mcg/actuation nasal spray Two squirts both nostrils at bedtime    cyclobenzaprine (FLEXERIL) 5 mg tablet Take 5 mg by mouth three (3) times daily as needed for Muscle Spasm(s). Takes 1 to 2 tablets    topiramate (TOPAMAX) 200 mg tablet Take  by mouth two (2) times a day. Indications: MIGRAINE PREVENTION    traZODone (DESYREL) 150 mg tablet Take 150 mg by mouth nightly. No current facility-administered medications for this visit. Past Surgical History:   Procedure Laterality Date    HX CARPAL TUNNEL RELEASE Left     HX HEENT  2017    sinus surgery    HX OTHER SURGICAL  2008    sinus    HX OTHER SURGICAL  2009    stress test    HX OTHER SURGICAL  03/31/2016    EPIDURAL INJECTIONS    HX SHOULDER ARTHROSCOPY Left     HX TURP  05/20/2016    Dr. Inez Haynes HX TURP  07/22/2016    [place] HX UROLOGICAL  09/12/2017    [facility], Cysto, direct visualized internal urethrotomy, Dr. Barry Greene       Diagnostic Studies:  CARDIOLOGY STUDIES 4/8/2015 4/3/2015   Exercise Nuclear Stress Test Result - sev HTN response, 39%EF, antbasal/apical ischemia? artifact from arm   Echocardiogram - Complete Result 55%EF, mild DD/dil LA -   Some recent data might be hidden   7/17 Nuc Stress  IMPRESSION:     1. No evidence of ischemia. 2.  Ejection fraction calculated at 33%    7/17 ECHO  1. Normal LV size and systolic function. EF ~ 55%  2. Grade I diastolic dysfunction. 3. No significant valvular pathology. Visit Vitals    /90    Pulse 78    Ht 5' 9\" (1.753 m)    Wt 124.7 kg (275 lb)    BMI 40.61 kg/m2       Mr. Kaur Nguyen has a reminder for a \"due or due soon\" health maintenance.  I have asked that he contact his primary care provider for follow-up on this health maintenance. Physical Exam   Constitutional: He is oriented to person, place, and time. He appears well-developed and well-nourished. No distress. HENT:   Head: Normocephalic and atraumatic. Mouth/Throat: Normal dentition. Eyes: Right eye exhibits no discharge. Left eye exhibits no discharge. No scleral icterus. Neck: Neck supple. No JVD present. Carotid bruit is not present. No thyromegaly present. Cardiovascular: Normal rate, regular rhythm, S1 normal, S2 normal, normal heart sounds and intact distal pulses. Exam reveals no gallop and no friction rub. No murmur heard. Pulmonary/Chest: Effort normal and breath sounds normal. He has no wheezes. He has no rales. Abdominal: Soft. He exhibits no mass. There is no tenderness. Musculoskeletal: He exhibits edema (trace). Lymphadenopathy:        Right cervical: No superficial cervical adenopathy present. Left cervical: No superficial cervical adenopathy present. Neurological: He is alert and oriented to person, place, and time. Skin: Skin is warm and dry. No rash noted. Psychiatric: He has a normal mood and affect. His behavior is normal.       ASSESSMENT and PLAN    Discussed the patient's above normal BMI with him. I have recommended the following interventions: dietary management education, guidance, and counseling . The BMI follow up plan is as follows: BMI is out of normal parameters and plan is as follows: I have counseled this patient on diet and exercise regimens          Diagnoses and all orders for this visit:    1. Mixed hyperlipidemia  Comments:  11/16 LDL67,    2. HTN (hypertension), benign  Comments:  11/17 add hctz;   Orders:  -     hydroCHLOROthiazide (HYDRODIURIL) 12.5 mg tablet; Take 1 Tab by mouth daily.  -     METABOLIC PANEL, BASIC; Future    3.  Tobacco dependence  Comments:  Patient advised to stop smoking to reduce future cardiovascular events. 4. Acute deep vein thrombosis (DVT) of right lower extremity, unspecified vein (HCC)  Comments:  Recurrent in 10/17- Rx per PCP  pending w[facility]u with [facility]        Pertinent laboratory and test data reviewed and discussed with patient. See patient instructions also for other medical advice given    Medications Discontinued During This Encounter   Medication Reason    Blood Pressure Monitor kit Other    finasteride (PROSCAR) 5 mg tablet Discontinued by Another Clinician    fluticasone-salmeterol (ADVAIR) 250-50 mcg/dose diskus inhaler Not A Current Medication    oxyCODONE-acetaminophen (PERCOCET)  mg per tablet Not A Current Medication    simvastatin (ZOCOR) 20 mg tablet Alternate Therapy    cholecalciferol (VITAMIN D3) 1,000 unit cap Not A Current Medication       Follow-up Disposition:  Return in about 6 months (around 5/9/2018), or if symptoms worsen or fail to improve.

## 2017-11-09 NOTE — MR AVS SNAPSHOT
Visit Information Date & Time Provider Department Dept. Phone Encounter #  
 11/9/2017 11:00 AM Blane Perez MD [facility] [address] [place] [address] [id number] Follow-up Instructions Return in about 6 months (around 5/9/2018), or if symptoms worsen or fail to improve. Your Appointments 1/12/2018  8:40 AM  
Follow Up with Carl Navarro PA-C [address] [place] [address] Pain Management (JESSICA SCHEDULING) Appt Note: Return in about 4 months (around 1/11/2018). ; kirby w/provider [address] [facility] [address]  
[phone] [address]  
  
    
 5/2/2018  9:00 AM  
ESTABLISHED PATIENT with Dominique Noble MD  
[facility]) Appt Note: 6 month f/u  
 [address] [phone]  
  
    
 5/7/2018  9:00 AM  
ESTABLISHED PATIENT with Blane Perez MD  
[facility]) Appt Note: 300 [facility], [address] [facility] [id number], [address] Upcoming Health Maintenance Date Due Hepatitis C Screening 1960 HEMOGLOBIN A1C Q6M 1960 FOOT EXAM Q1 3/19/1969 MICROALBUMIN Q1 3/19/1969 EYE EXAM RETINAL OR DILATED Q1 3/19/1969 Pneumococcal 19-64 Medium Risk (1 of 1 - PPSV23) 3/19/1978 DTaP/Tdap/Td series (1 - Tdap) 3/19/1980 COLONOSCOPY 3/19/2014 LIPID PANEL Q1 5/16/2017 Influenza Age 5 to Adult 8/1/2017 Allergies as of 11/9/2017  Review Complete On: 11/9/2017 By: Blane Perez MD  
  
 Severity Noted Reaction Type Reactions Seafood Medium 04/01/2015   Systemic Hives \"deviled-crabs\"  Does fine with all other seafood Aspirin  03/05/2014   Systemic Hives Other reaction(s): facial swelling Tomato  04/28/2014   Systemic Hives Current Immunizations  Never Reviewed Name Date Influenza Vaccine 11/11/2014 Not reviewed this visit You Were Diagnosed With   
  
 Codes Comments Mixed hyperlipidemia    -  Primary ICD-10-CM: Z44.6 ICD-9-CM: 272.2 11/16 LDL67,  
 HTN (hypertension), benign     ICD-10-CM: I10 
ICD-9-CM: 401.1 11/17 add hctz;   
 Tobacco dependence     ICD-10-CM: F17.200 ICD-9-CM: 305.1 Patient advised to stop smoking to reduce future cardiovascular events. Acute deep vein thrombosis (DVT) of right lower extremity, unspecified vein (HCC)     ICD-10-CM: I82.401 ICD-9-CM: 453.40 Recurrent in 10/17- Rx per PCP 
pending w/u with [facility] Vitals BP Pulse Height(growth percentile) Weight(growth percentile) BMI Smoking Status 149/90 78 5' 9\" (1.753 m) 275 lb (124.7 kg) 40.61 kg/m2 Current Every Day Smoker Vitals History BMI and BSA Data Body Mass Index Body Surface Area  
 40.61 kg/m 2 2.46 m 2 Preferred Pharmacy Pharmacy Name Phone [facility] [address] [place] [address] [phone] Your Updated Medication List  
  
   
This list is accurate as of: 11/9/17 12:26 PM.  Always use your most recent med list.  
  
  
  
  
 albuterol 90 mcg/actuation inhaler Commonly known as:  PROVENTIL HFA, VENTOLIN HFA, PROAIR HFA  
2 Puffs every four (4) hours as needed. amLODIPine 10 mg tablet Commonly known as:  Napoleon Conine Take 1 Tab by mouth daily. ammonium lactate 12 % lotion Commonly known as:  LAC-HYDRIN Apply  to affected area as needed (feet). Azelastine 0.15 % (205.5 mcg) nasal spray Commonly known as:  ASTEPRO  
1 [place] by Both Nostrils route. cyclobenzaprine 5 mg tablet Commonly known as:  FLEXERIL Take 5 mg by mouth three (3) times daily as needed for Muscle Spasm(s). Takes 1 to 2 tablets  
  
 diclofenac 1 % Gel Commonly known as:  VOLTAREN  
 Apply 2 g to affected area four (4) times daily. Apply to painful knees, shoulder, and wrist as needed  
  
 escitalopram oxalate 20 mg tablet Commonly known as:  Celesta Lana Take 20 mg by mouth daily. fluticasone 50 mcg/actuation nasal spray Commonly known as:  [place] Two squirts both nostrils at bedtime  
  
 gabapentin 300 mg capsule Commonly known as:  NEURONTIN Take 1 Cap by mouth three (3) times daily. For nerve pain. 1 cap qhs for 1 wk, then increase to 1 cap BID for 1 wk, then increase to 1 cap TID  
  
 hydroCHLOROthiazide 12.5 mg tablet Commonly known as:  HYDRODIURIL Take 1 Tab by mouth daily. montelukast 10 mg tablet Commonly known as:  SINGULAIR  
TAKE 1 TABLET BY MOUTH EVERY DAY NovoLOG Mix 70-30 FlexPen 100 unit/mL (70-30) Inpn Generic drug:  insulin aspart protamine/insulin aspart Indications: pt adjusts daily  
  
 oxyCODONE-acetaminophen 5-325 mg per tablet Commonly known as:  PERCOCET Take 1 Tab by mouth every four (4) hours as needed for Pain. Max Daily Amount: 6 Tabs. polyethylene glycol 17 gram/dose powder Commonly known as:  Waymond Darrick 17 g as needed. pravastatin 40 mg tablet Commonly known as:  PRAVACHOL Take 40 mg by mouth nightly. prazosin 5 mg capsule Commonly known as:  MINIPRESS Take 1 Cap by mouth nightly. tamsulosin 0.4 mg capsule Commonly known as:  FLOMAX Take 1 Cap by mouth daily. topiramate 200 mg tablet Commonly known as:  TOPAMAX Take  by mouth two (2) times a day. Indications: MIGRAINE PREVENTION  
  
 traZODone 150 mg tablet Commonly known as:  Chastity Maciel Take 150 mg by mouth nightly. * trifluoperazine 10 mg tablet Commonly known as:  STELAZINE 10 mg nightly. Indications: SCHIZOPHRENIA  
  
 * trifluoperazine 5 mg tablet Commonly known as:  STELAZINE  
TAKE 1 TABLET BY MOUTH AT BEDTIME TAKE WITH 10 MG TABLET = 15 MG TOTAL DOSE AT BEDTIME  
  
 valsartan 160 mg tablet Commonly known as:  DIOVAN Take 1 Tab by mouth daily. Indications: hypertension XARELTO 15 mg Tab tablet Generic drug:  rivaroxaban Take 15 mg by mouth two (2) times daily (with meals). ziprasidone 80 mg capsule Commonly known as:  GEODON  
daily as needed. * Notice: This list has 2 medication(s) that are the same as other medications prescribed for you. Read the directions carefully, and ask your doctor or other care provider to review them with you. Prescriptions Sent to Pharmacy Refills  
 hydroCHLOROthiazide (HYDRODIURIL) 12.5 mg tablet 3 Sig: Take 1 Tab by mouth daily. Class: Normal  
 Pharmacy: [address] [place] [address] #: [phone] Route: Oral  
  
Follow-up Instructions Return in about 6 months (around 5/9/2018), or if symptoms worsen or fail to improve. To-Do List   
 Around 12/04/2017 Lab:  METABOLIC PANEL, BASIC Patient Instructions After the recommended changes have been made in blood pressure medicines, patient advised to keep BP/HR(pulse rate) chart twice daily and bring us results in next 1 weeks or so. Patient may send the results via \"My Chart\" if desired. Please rest for 5-10 minutes before checking blood pressure Medications Discontinued During This Encounter Medication Reason  Blood Pressure Monitor kit Other  finasteride (PROSCAR) 5 mg tablet Discontinued by Another Clinician  fluticasone-salmeterol (ADVAIR) 250-50 mcg/dose diskus inhaler Not A Current Medication  oxyCODONE-acetaminophen (PERCOCET)  mg per tablet Not A Current Medication  simvastatin (ZOCOR) 20 mg tablet Alternate Therapy  cholecalciferol (VITAMIN D3) 1,000 unit cap Not A Current Medication Orders Placed This Encounter  METABOLIC PANEL, BASIC Standing Status:   Future Standing Expiration Date:   2/9/2018  hydroCHLOROthiazide (HYDRODIURIL) 12.5 mg tablet Sig: Take 1 Tab by mouth daily. Dispense:  30 Tab Refill:  3 Stopping Smoking: Care Instructions Your Care Instructions Cigarette smokers crave the nicotine in cigarettes. Giving it up is much harder than simply changing a habit. Your body has to stop craving the nicotine. It is hard to quit, but you can do it. There are many tools that people use to quit smoking. You may find that combining tools works best for you. There are several steps to quitting. First you get ready to quit. Then you get support to help you. After that, you learn new skills and behaviors to become a nonsmoker. For many people, a necessary step is getting and using medicine. Your doctor will help you set up the plan that best meets your needs. You may want to attend a smoking cessation program to help you quit smoking. When you choose a program, look for one that has proven success. Ask your doctor for ideas. You will greatly increase your chances of success if you take medicine as well as get counseling or join a cessation program. 
Some of the changes you feel when you first quit tobacco are uncomfortable. Your body will miss the nicotine at first, and you may feel short-tempered and grumpy. You may have trouble sleeping or concentrating. Medicine can help you deal with these symptoms. You may struggle with changing your smoking habits and rituals. The last step is the tricky one: Be prepared for the smoking urge to continue for a time. This is a lot to deal with, but keep at it. You will feel better. Follow-up care is a key part of your treatment and safety. Be sure to make and go to all appointments, and call your doctor if you are having problems. It's also a good idea to know your test results and keep a list of the medicines you take. How can you care for yourself at home? · Ask your family, friends, and coworkers for support. You have a better chance of quitting if you have help and support. · Join a support group, such as Nicotine Anonymous, for people who are trying to quit smoking. · Consider signing up for a smoking cessation program, such as the American Lung Association's Freedom from Smoking program. 
· Set a quit date. Pick your date carefully so that it is not right in the middle of a big deadline or stressful time. Once you quit, do not even take a puff. Get rid of all ashtrays and lighters after your last cigarette. Clean your house and your clothes so that they do not smell of smoke. · Learn how to be a nonsmoker. Think about ways you can avoid those things that make you reach for a cigarette. ¨ Avoid situations that put you at greatest risk for smoking. For some people, it is hard to have a drink with friends without smoking. For others, they might skip a coffee break with coworkers who smoke. ¨ Change your daily routine. Take a different route to work or eat a meal in a different place. · Cut down on stress. Calm yourself or release tension by doing an activity you enjoy, such as reading a book, taking a hot bath, or gardening. · Talk to your doctor or pharmacist about nicotine replacement therapy, which replaces the nicotine in your body. You still get nicotine but you do not use tobacco. Nicotine replacement products help you slowly reduce the amount of nicotine you need. These products come in several forms, many of them available over-the-counter: ¨ Nicotine patches ¨ Nicotine gum and lozenges ¨ Nicotine inhaler · Ask your doctor about bupropion (Wellbutrin) or varenicline (Chantix), which are prescription medicines. They do not contain nicotine. They help you by reducing withdrawal symptoms, such as stress and anxiety. · Some people find hypnosis, acupuncture, and massage helpful for ending the smoking habit. · Eat a healthy diet and get regular exercise. Having healthy habits will help your body move past its craving for nicotine. · Be prepared to keep trying. Most people are not successful the first few times they try to quit. Do not get mad at yourself if you smoke again. Make a list of things you learned and think about when you want to try again, such as next week, next month, or next year. Where can you learn more? Go to http://bailey-cristian.info/. Enter Y637 in the search box to learn more about \"Stopping Smoking: Care Instructions. \" Current as of: March 20, 2017 Content Version: 11.4 © 8097-0993 [employer]. Care instructions adapted under license by [employer] (which disclaims liability or warranty for this information). If you have questions about a medical condition or this instruction, always ask your healthcare professional. [address] any warranty or liability for your use of this information. Introducing [facility] [place] [facility]! [place] introduces Cool[employer]ure patient portal. Now you can access parts of your medical record, email your doctor's office, and request medication refills online. 1. In your internet browser, go to [email or web address] 2. Click on the First Time User? Click Here link in the Sign In box. You will see the New Member Sign Up page. 3. Enter your Coolture Access Code exactly as it appears below. You will not need to use this code after youve completed the sign-up process. If you do not sign up before the expiration date, you must request a new code. · Coolture Access Code: [email or web address] Expires: 12/10/2017  9:20 AM 
 
4. Enter the last four digits of your Social Security Number (xxxx) and Date of Birth (mm/dd/yyyy) as indicated and click Submit. You will be taken to the next sign-up page. 5. Create a Coolture ID. This will be your Coolture login ID and cannot be changed, so think of one that is secure and easy to remember. 6. Create a Coolture password. You can change your password at any time. 7. Enter your Password Reset Question and Answer. This can be used at a later time if you forget your password. 8. Enter your e-mail address. You will receive e-mail notification when new information is available in [address] 9. Click Sign Up. You can now view and download portions of your medical record. 10. Click the Download Summary menu link to download a portable copy of your medical information. If you have questions, please visit the Frequently Asked Questions section of the [employer] website. Remember, [employer] is NOT to be used for urgent needs. For medical emergencies, dial 911. Now available from your iPhone and Android! Please provide this summary of care documentation to your next provider. Your primary care clinician is listed as RAMAKRISHNA GROSSMAN. If you have any questions after today's visit, please call [phone].

## 2017-11-09 NOTE — PATIENT INSTRUCTIONS
After the recommended changes have been made in blood pressure medicines, patient advised to keep BP/HR(pulse rate) chart twice daily and bring us results in next 1 weeks or so. Patient may send the results via \"My Chart\" if desired. Please rest for 5-10 minutes before checking blood pressure    Medications Discontinued During This Encounter   Medication Reason    Blood Pressure Monitor kit Other    finasteride (PROSCAR) 5 mg tablet Discontinued by Another Clinician    fluticasone-salmeterol (ADVAIR) 250-50 mcg/dose diskus inhaler Not A Current Medication    oxyCODONE-acetaminophen (PERCOCET)  mg per tablet Not A Current Medication    simvastatin (ZOCOR) 20 mg tablet Alternate Therapy    cholecalciferol (VITAMIN D3) 1,000 unit cap Not A Current Medication       Orders Placed This Encounter    METABOLIC PANEL, BASIC     Standing Status:   Future     Standing Expiration Date:   2/9/2018    hydroCHLOROthiazide (HYDRODIURIL) 12.5 mg tablet     Sig: Take 1 Tab by mouth daily. Dispense:  30 Tab     Refill:  3            Stopping Smoking: Care Instructions  Your Care Instructions    Cigarette smokers crave the nicotine in cigarettes. Giving it up is much harder than simply changing a habit. Your body has to stop craving the nicotine. It is hard to quit, but you can do it. There are many tools that people use to quit smoking. You may find that combining tools works best for you. There are several steps to quitting. First you get ready to quit. Then you get support to help you. After that, you learn new skills and behaviors to become a nonsmoker. For many people, a necessary step is getting and using medicine. Your doctor will help you set up the plan that best meets your needs. You may want to attend a smoking cessation program to help you quit smoking. When you choose a program, look for one that has proven success. Ask your doctor for ideas.  You will greatly increase your chances of success if you take medicine as well as get counseling or join a cessation program.  Some of the changes you feel when you first quit tobacco are uncomfortable. Your body will miss the nicotine at first, and you may feel short-tempered and grumpy. You may have trouble sleeping or concentrating. Medicine can help you deal with these symptoms. You may struggle with changing your smoking habits and rituals. The last step is the tricky one: Be prepared for the smoking urge to continue for a time. This is a lot to deal with, but keep at it. You will feel better. Follow-up care is a key part of your treatment and safety. Be sure to make and go to all appointments, and call your doctor if you are having problems. It's also a good idea to know your test results and keep a list of the medicines you take. How can you care for yourself at home? · Ask your family, friends, and coworkers for support. You have a better chance of quitting if you have help and support. · Join a support group, such as Nicotine Anonymous, for people who are trying to quit smoking. · Consider signing up for a smoking cessation program, such as the American Lung Association's Freedom from Smoking program.  · Set a quit date. Pick your date carefully so that it is not right in the middle of a big deadline or stressful time. Once you quit, do not even take a puff. Get rid of all ashtrays and lighters after your last cigarette. Clean your house and your clothes so that they do not smell of smoke. · Learn how to be a nonsmoker. Think about ways you can avoid those things that make you reach for a cigarette. ¨ Avoid situations that put you at greatest risk for smoking. For some people, it is hard to have a drink with friends without smoking. For others, they might skip a coffee break with coworkers who smoke. ¨ Change your daily routine. Take a different route to work or eat a meal in a different place. · Cut down on stress.  Calm yourself or release tension by doing an activity you enjoy, such as reading a book, taking a hot bath, or gardening. · Talk to your doctor or pharmacist about nicotine replacement therapy, which replaces the nicotine in your body. You still get nicotine but you do not use tobacco. Nicotine replacement products help you slowly reduce the amount of nicotine you need. These products come in several forms, many of them available over-the-counter:  ¨ Nicotine patches  ¨ Nicotine gum and lozenges  ¨ Nicotine inhaler  · Ask your doctor about bupropion (Wellbutrin) or varenicline (Chantix), which are prescription medicines. They do not contain nicotine. They help you by reducing withdrawal symptoms, such as stress and anxiety. · Some people find hypnosis, acupuncture, and massage helpful for ending the smoking habit. · Eat a healthy diet and get regular exercise. Having healthy habits will help your body move past its craving for nicotine. · Be prepared to keep trying. Most people are not successful the first few times they try to quit. Do not get mad at yourself if you smoke again. Make a list of things you learned and think about when you want to try again, such as next week, next month, or next year. Where can you learn more? Go to http://bailey-cristian.info/. Enter L469 in the search box to learn more about \"Stopping Smoking: Care Instructions. \"  Current as of: March 20, 2017  Content Version: 11.4  © 9992-9023 Healthwise, Incorporated. Care instructions adapted under license by [employer] (which disclaims liability or warranty for this information). If you have questions about a medical condition or this instruction, always ask your healthcare professional. [place] [address] any warranty or liability for your use of this information.

## 2017-11-09 NOTE — PROGRESS NOTES
1. Have you been to the ER, urgent care clinic since your last visit? Hospitalized since your last visit? NO    2. Have you seen or consulted any other health care providers outside of the [address] [place] [address] since your last visit? Include any pap smears or colon screening. Yes Where: PCP Routine     3. Since your last visit, have you had any of the following symptoms? shortness of breath. 4.  Have you had any blood work, X-rays or cardiac testing? Yes Where: PCP     Requested: NO     In [facility]: YES    5. Where do you normally have your labs drawn? [facility]    6. Do you need any refills today?    NO

## 2017-11-13 DIAGNOSIS — E78.2 MIXED HYPERLIPIDEMIA: ICD-10-CM

## 2017-11-17 DIAGNOSIS — I25.119 ATHEROSCLEROSIS OF NATIVE CORONARY ARTERY WITH ANGINA PECTORIS, UNSPECIFIED WHETHER NATIVE OR TRANSPLANTED HEART (HCC): ICD-10-CM

## 2017-12-26 DIAGNOSIS — I10 HTN (HYPERTENSION), BENIGN: ICD-10-CM

## 2017-12-26 RX ORDER — DILTIAZEM HYDROCHLORIDE 240 MG/1
CAPSULE, EXTENDED RELEASE ORAL
Qty: 30 CAP | Refills: 5 | OUTPATIENT
Start: 2017-12-26

## 2018-01-12 ENCOUNTER — OFFICE VISIT (OUTPATIENT)
Dept: PAIN MANAGEMENT | Age: 58
End: 2018-01-12

## 2018-01-12 VITALS
RESPIRATION RATE: 18 BRPM | HEIGHT: 69 IN | BODY MASS INDEX: 40.73 KG/M2 | TEMPERATURE: 98.7 F | SYSTOLIC BLOOD PRESSURE: 132 MMHG | HEART RATE: 79 BPM | DIASTOLIC BLOOD PRESSURE: 80 MMHG | WEIGHT: 275 LBS

## 2018-01-12 DIAGNOSIS — M47.817 LUMBOSACRAL SPONDYLOSIS WITHOUT MYELOPATHY: Primary | ICD-10-CM

## 2018-01-12 DIAGNOSIS — G56.22 CUBITAL TUNNEL SYNDROME ON LEFT: ICD-10-CM

## 2018-01-12 DIAGNOSIS — G89.4 CHRONIC PAIN SYNDROME: ICD-10-CM

## 2018-01-12 DIAGNOSIS — M54.42 MIDLINE LOW BACK PAIN WITH LEFT-SIDED SCIATICA, UNSPECIFIED CHRONICITY: ICD-10-CM

## 2018-01-12 DIAGNOSIS — M51.37 DEGENERATION OF LUMBAR OR LUMBOSACRAL INTERVERTEBRAL DISC: ICD-10-CM

## 2018-01-12 DIAGNOSIS — I82.401 ACUTE DEEP VEIN THROMBOSIS (DVT) OF RIGHT LOWER EXTREMITY, UNSPECIFIED VEIN (HCC): ICD-10-CM

## 2018-01-12 DIAGNOSIS — Z79.899 ENCOUNTER FOR LONG-TERM (CURRENT) DRUG USE: ICD-10-CM

## 2018-01-12 DIAGNOSIS — G56.02 CARPAL TUNNEL SYNDROME OF LEFT WRIST: ICD-10-CM

## 2018-01-12 DIAGNOSIS — M54.12 CERVICAL RADICULOPATHY: ICD-10-CM

## 2018-01-12 DIAGNOSIS — M54.2 CERVICALGIA: ICD-10-CM

## 2018-01-12 DIAGNOSIS — M15.9 PRIMARY OSTEOARTHRITIS INVOLVING MULTIPLE JOINTS: ICD-10-CM

## 2018-01-12 PROBLEM — E11.40 TYPE 2 DIABETES MELLITUS WITH DIABETIC NEUROPATHY (HCC): Status: ACTIVE | Noted: 2018-01-12

## 2018-01-12 RX ORDER — OXYCODONE AND ACETAMINOPHEN 10; 325 MG/1; MG/1
1 TABLET ORAL
Qty: 120 TAB | Refills: 0 | Status: SHIPPED | OUTPATIENT
Start: 2018-01-12 | End: 2018-03-28 | Stop reason: SDUPTHER

## 2018-01-12 RX ORDER — OXYCODONE AND ACETAMINOPHEN 10; 325 MG/1; MG/1
1 TABLET ORAL
Qty: 120 TAB | Refills: 0 | Status: SHIPPED | OUTPATIENT
Start: 2018-03-10 | End: 2018-04-09

## 2018-01-12 RX ORDER — OXYCODONE AND ACETAMINOPHEN 10; 325 MG/1; MG/1
1 TABLET ORAL
Qty: 120 TAB | Refills: 0 | Status: SHIPPED | OUTPATIENT
Start: 2018-02-11 | End: 2018-03-28 | Stop reason: SDUPTHER

## 2018-01-12 NOTE — PROGRESS NOTES
Nursing Notes    Patient presents to the office today in follow-up. Patient rates his pain at 7/10 on the numerical pain scale. Reviewed medications with counts as follows:    Patient did not bring empty medication bottle(s) Percocet  mg filled 12/10/17  for this office visit; advised to bring in all medication bottles, empty or otherwise, for all office visits per [facility] policy. Patient stated that he did not know he was suppose to bring his empty pill bottle. Patient understand that he suppose to bring all medication to every appointment. POC UDS was not performed in office today    Any new labs or imaging since last appointment? NO    Have you been to an emergency room (ER) or urgent care clinic since your last visit? NO            Have you been hospitalized since your last visit? NO     If yes, where, when, and reason for visit? Have you seen or consulted any other health care providers outside of the [address] [place] [address]  since your last visit? NO     If yes, where, when, and reason for visit? HM deferred to pcp.
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
## 2018-01-12 NOTE — MR AVS SNAPSHOT
Visit Information Date & Time Provider Department Dept. Phone Encounter #  
 1/12/2018  8:40 AM [facility]  Follow-up Instructions Return in about 3 months (around 4/12/2018). Your Appointments 5/2/2018  9:00 AM  
ESTABLISHED PATIENT with Tung Canada MD  
[facility]. Jamie Brice [address] [place] [address]) Appt Note: 6 month f/u  
 [address] [id number]  
  
    
 5/7/2018  9:00 AM  
ESTABLISHED PATIENT with Mariposa Santana MD  
[facility]) Appt Note: [address] [facility], [address] [facility] [address]  
[facility], [address] [place] [address] Upcoming Health Maintenance Date Due Hepatitis C Screening 1960 HEMOGLOBIN A1C Q6M 1960 FOOT EXAM Q1 3/19/1969 MICROALBUMIN Q1 3/19/1969 EYE EXAM RETINAL OR DILATED Q1 3/19/1969 Pneumococcal 19-64 Medium Risk (1 of 1 - PPSV23) 3/19/1978 DTaP/Tdap/Td series (1 - Tdap) 3/19/1980 COLONOSCOPY 3/19/2014 LIPID PANEL Q1 5/16/2017 Influenza Age 5 to Adult 8/1/2017 Allergies as of 1/12/2018  Review Complete On: 1/12/2018 By: Misty Sanchez PA-C Severity Noted Reaction Type Reactions Seafood Medium 04/01/2015   Systemic Hives \"deviled-crabs\"  Does fine with all other seafood Aspirin  03/05/2014   Systemic Hives Other reaction(s): facial swelling Tomato  04/28/2014   Systemic Hives Current Immunizations  Never Reviewed Name Date Influenza Vaccine 11/11/2014 Not reviewed this visit You Were Diagnosed With   
  
 Codes Comments Lumbosacral spondylosis without myelopathy    -  Primary ICD-10-CM: Y54.007 ICD-9-CM: 721.3  Midline low back pain with left-sided sciatica, unspecified chronicity ICD-10-CM: M54.42 
ICD-9-CM: 724.3 Primary osteoarthritis involving multiple joints     ICD-10-CM: M15.0 ICD-9-CM: 715.09 Degeneration of lumbar or lumbosacral intervertebral disc     ICD-10-CM: M51.37 
ICD-9-CM: 722.52 Cervical radiculopathy     ICD-10-CM: M54.12 
ICD-9-CM: 723.4 Cubital tunnel syndrome on left     ICD-10-CM: G56.22 
ICD-9-CM: 354.2 Carpal tunnel syndrome of left wrist     ICD-10-CM: G56.02 
ICD-9-CM: 354.0 Cervicalgia     ICD-10-CM: M54.2 ICD-9-CM: 723.1 Chronic pain syndrome     ICD-10-CM: G89.4 ICD-9-CM: 338.4 Encounter for long-term (current) drug use     ICD-10-CM: Z79.899 ICD-9-CM: V58.69 Acute deep vein thrombosis (DVT) of right lower extremity, unspecified vein (HCC)     ICD-10-CM: I82.401 ICD-9-CM: 453.40 Recurrent in 10/17- Rx per PCP 
pending w/u with [facility] Vitals BP Pulse Temp Resp Height(growth percentile) Weight(growth percentile) 132/80 (BP 1 Location: Right arm, BP Patient Position: Sitting) 79 98.7 °F (37.1 °C) (Oral) 18 5' 9\" (1.753 m) 275 lb (124.7 kg) BMI Smoking Status 40.61 kg/m2 Current Every Day Smoker BMI and BSA Data Body Mass Index Body Surface Area  
 40.61 kg/m 2 2.46 m 2 Preferred Pharmacy Pharmacy Name Phone [facility] [address] [address] [facility] [phone] Your Updated Medication List  
  
   
This list is accurate as of: 1/12/18  9:22 AM.  Always use your most recent med list.  
  
  
  
  
 albuterol 90 mcg/actuation inhaler Commonly known as:  PROVENTIL HFA, VENTOLIN HFA, PROAIR HFA  
2 Puffs every four (4) hours as needed. amLODIPine 10 mg tablet Commonly known as:  Maribeth Gregorio Take 1 Tab by mouth daily. ammonium lactate 12 % lotion Commonly known as:  LAC-HYDRIN Apply  to affected area as needed (feet). Azelastine 0.15 % (205.5 mcg) nasal spray Commonly known as:  ASTEPRO  
 1 [place] by Both Nostrils route. cyclobenzaprine 5 mg tablet Commonly known as:  FLEXERIL Take 5 mg by mouth three (3) times daily as needed for Muscle Spasm(s). Takes 1 to 2 tablets  
  
 diclofenac 1 % Gel Commonly known as:  VOLTAREN Apply 2 g to affected area four (4) times daily. Apply to painful knees, shoulder, and wrist as needed  
  
 escitalopram oxalate 20 mg tablet Commonly known as:  Roman Fossa Take 20 mg by mouth daily. fluticasone 50 mcg/actuation nasal spray Commonly known as:  Musa Wilkinser Two squirts both nostrils at bedtime  
  
 gabapentin 300 mg capsule Commonly known as:  NEURONTIN Take 1 Cap by mouth three (3) times daily. For nerve pain. 1 cap qhs for 1 wk, then increase to 1 cap BID for 1 wk, then increase to 1 cap TID  
  
 hydroCHLOROthiazide 12.5 mg tablet Commonly known as:  HYDRODIURIL Take 1 Tab by mouth daily. montelukast 10 mg tablet Commonly known as:  SINGULAIR  
TAKE 1 TABLET BY MOUTH EVERY DAY NovoLOG Mix 70-30 FlexPen 100 unit/mL (70-30) Inpn Generic drug:  insulin aspart protamine/insulin aspart Indications: pt adjusts daily * oxyCODONE-acetaminophen 5-325 mg per tablet Commonly known as:  PERCOCET Take 1 Tab by mouth every four (4) hours as needed for Pain. Max Daily Amount: 6 Tabs. * oxyCODONE-acetaminophen  mg per tablet Commonly known as:  PERCOCET Take 1 Tab by mouth four (4) times daily as needed for Pain for up to 30 days. Max Daily Amount: 4 Tabs. for chronic, severe, refractory pain  Indications: Pain * oxyCODONE-acetaminophen  mg per tablet Commonly known as:  PERCOCET Take 1 Tab by mouth four (4) times daily as needed for Pain for up to 30 days. Max Daily Amount: 4 Tabs. for chronic, severe, refractory pain  Indications: Pain Start taking on:  2/11/2018 * oxyCODONE-acetaminophen  mg per tablet Commonly known as:  PERCOCET  
 Take 1 Tab by mouth four (4) times daily as needed for Pain for up to 30 days. Max Daily Amount: 4 Tabs. for chronic, severe, refractory pain  Indications: Pain Start taking on:  3/10/2018  
  
 polyethylene glycol 17 gram/dose powder Commonly known as:  Suzen Rude 17 g as needed. pravastatin 40 mg tablet Commonly known as:  PRAVACHOL Take 40 mg by mouth nightly. prazosin 5 mg capsule Commonly known as:  MINIPRESS Take 1 Cap by mouth nightly. tamsulosin 0.4 mg capsule Commonly known as:  FLOMAX Take 1 Cap by mouth daily. topiramate 200 mg tablet Commonly known as:  TOPAMAX Take  by mouth two (2) times a day. Indications: MIGRAINE PREVENTION  
  
 traZODone 150 mg tablet Commonly known as:  Loletta Geoffrey Take 150 mg by mouth nightly. * trifluoperazine 10 mg tablet Commonly known as:  STELAZINE 10 mg nightly. Indications: SCHIZOPHRENIA  
  
 * trifluoperazine 5 mg tablet Commonly known as:  STELAZINE  
TAKE 1 TABLET BY MOUTH AT BEDTIME TAKE WITH 10 MG TABLET = 15 MG TOTAL DOSE AT BEDTIME  
  
 valsartan 160 mg tablet Commonly known as:  DIOVAN Take 1 Tab by mouth daily. Indications: hypertension XARELTO 15 mg Tab tablet Generic drug:  rivaroxaban Take 15 mg by mouth two (2) times daily (with meals). ziprasidone 80 mg capsule Commonly known as:  GEODON  
daily as needed. * Notice: This list has 6 medication(s) that are the same as other medications prescribed for you. Read the directions carefully, and ask your doctor or other care provider to review them with you. Prescriptions Printed Refills  
 oxyCODONE-acetaminophen (PERCOCET)  mg per tablet 0 Starting on: 3/10/2018 Sig: Take 1 Tab by mouth four (4) times daily as needed for Pain for up to 30 days. Max Daily Amount: 4 Tabs. for chronic, severe, refractory pain  Indications: Pain Class: Print  Route: Oral  
 oxyCODONE-acetaminophen (PERCOCET)  mg per tablet 0 Starting on: 2/11/2018 Sig: Take 1 Tab by mouth four (4) times daily as needed for Pain for up to 30 days. Max Daily Amount: 4 Tabs. for chronic, severe, refractory pain  Indications: Pain Class: Print Route: Oral  
 oxyCODONE-acetaminophen (PERCOCET)  mg per tablet 0 Sig: Take 1 Tab by mouth four (4) times daily as needed for Pain for up to 30 days. Max Daily Amount: 4 Tabs. for chronic, severe, refractory pain  Indications: Pain Class: Print Route: Oral  
  
Follow-up Instructions Return in about 3 months (around 4/12/2018). Introducing \[facility] [place] [facility]\"" & HEALTH SERVICES! Therese Apodaca introduces [employer] patient portal. Now you can access parts of your medical record, email your doctor's office, and request medication refills online. 1. In your internet browser, go to [email or web address] 2. Click on the First Time User? Click Here link in the Sign In box. You will see the New Member Sign Up page. 3. Enter your [employer] Access Code exactly as it appears below. You will not need to use this code after youve completed the sign-up process. If you do not sign up before the expiration date, you must request a new code. · [employer] Access Code: [email or web address] Expires: 4/12/2018  9:22 AM 
 
4. Enter the last four digits of your Social Security Number (xxxx) and Date of Birth (mm/dd/yyyy) as indicated and click Submit. You will be taken to the next sign-up page. 5. Create a [employer] ID. This will be your [employer] login ID and cannot be changed, so think of one that is secure and easy to remember. 6. Create a [employer] password. You can change your password at any time. 7. Enter your Password Reset Question and Answer. This can be used at a later time if you forget your password. 8. Enter your e-mail address. You will receive e-mail notification when new information is available in [address] 9. Click Sign Up. You can now view and download portions of your medical record. 10. Click the Download Summary menu link to download a portable copy of your medical information. If you have questions, please visit the Frequently Asked Questions section of the [employer] website. Remember, [employer] is NOT to be used for urgent needs. For medical emergencies, dial 911. Now available from your iPhone and Android! Please provide this summary of care documentation to your next provider. Your primary care clinician is listed as RAMAKRISHNA GROSSMAN. If you have any questions after today's visit, please call [phone].

## 2018-02-01 DIAGNOSIS — I10 HTN (HYPERTENSION), BENIGN: ICD-10-CM

## 2018-02-01 RX ORDER — HYDROCHLOROTHIAZIDE 12.5 MG/1
12.5 TABLET ORAL DAILY
Qty: 30 TAB | Refills: 3 | Status: SHIPPED | OUTPATIENT
Start: 2018-02-01 | End: 2018-02-05 | Stop reason: SDUPTHER

## 2018-02-05 DIAGNOSIS — I10 HTN (HYPERTENSION), BENIGN: ICD-10-CM

## 2018-02-05 RX ORDER — HYDROCHLOROTHIAZIDE 12.5 MG/1
12.5 TABLET ORAL DAILY
Qty: 90 TAB | Refills: 3 | Status: SHIPPED | OUTPATIENT
Start: 2018-02-05 | End: 2018-05-07 | Stop reason: ALTCHOICE

## 2018-03-28 ENCOUNTER — OFFICE VISIT (OUTPATIENT)
Dept: PAIN MANAGEMENT | Age: 58
End: 2018-03-28

## 2018-03-28 VITALS
BODY MASS INDEX: 40.73 KG/M2 | DIASTOLIC BLOOD PRESSURE: 82 MMHG | RESPIRATION RATE: 16 BRPM | WEIGHT: 275 LBS | HEIGHT: 69 IN | TEMPERATURE: 97.4 F | HEART RATE: 76 BPM | SYSTOLIC BLOOD PRESSURE: 145 MMHG

## 2018-03-28 DIAGNOSIS — M47.817 LUMBOSACRAL SPONDYLOSIS WITHOUT MYELOPATHY: ICD-10-CM

## 2018-03-28 DIAGNOSIS — M15.9 PRIMARY OSTEOARTHRITIS INVOLVING MULTIPLE JOINTS: ICD-10-CM

## 2018-03-28 DIAGNOSIS — M54.42 MIDLINE LOW BACK PAIN WITH LEFT-SIDED SCIATICA, UNSPECIFIED CHRONICITY: ICD-10-CM

## 2018-03-28 DIAGNOSIS — M54.2 CERVICALGIA: ICD-10-CM

## 2018-03-28 DIAGNOSIS — M51.37 DEGENERATION OF LUMBAR OR LUMBOSACRAL INTERVERTEBRAL DISC: Primary | ICD-10-CM

## 2018-03-28 DIAGNOSIS — G89.4 CHRONIC PAIN SYNDROME: ICD-10-CM

## 2018-03-28 RX ORDER — OXYCODONE AND ACETAMINOPHEN 10; 325 MG/1; MG/1
1 TABLET ORAL
Qty: 120 TAB | Refills: 0 | Status: SHIPPED | OUTPATIENT
Start: 2018-05-07 | End: 2018-05-04

## 2018-03-28 RX ORDER — OXYCODONE AND ACETAMINOPHEN 10; 325 MG/1; MG/1
1 TABLET ORAL
Qty: 120 TAB | Refills: 0 | Status: SHIPPED | OUTPATIENT
Start: 2018-04-08 | End: 2018-05-04

## 2018-03-28 RX ORDER — OXYCODONE AND ACETAMINOPHEN 10; 325 MG/1; MG/1
1 TABLET ORAL
Qty: 120 TAB | Refills: 0 | Status: SHIPPED | OUTPATIENT
Start: 2018-06-05 | End: 2018-06-13 | Stop reason: SDUPTHER

## 2018-03-28 NOTE — MR AVS SNAPSHOT
Jacinda [facility] [place] [facility] [address] [facility] [address] 
[phone] Patient: Kalyn Brambila MRN: [id number] LDI:[id number] Visit Information Date & Time Provider Department Dept. Phone Encounter #  
 3/28/2018  9:00 AM Lina Hicks [facility] [phone] Your Appointments 5/2/2018  9:00 AM  
ESTABLISHED PATIENT with Greyson Toledo MD  
[facility] Yuniel [address] [place] [address]) Appt Note: 6 month f/u  
 [address] [facility] [address] [place] [address] [phone]  
  
    
 5/7/2018  9:00 AM  
ESTABLISHED PATIENT with Jodi Lake MD  
[facility]) Appt Note: [address] [facility], [address] [facility] [address]  
[address] [facility] [place] [address] Upcoming Health Maintenance Date Due Hepatitis C Screening 1960 HEMOGLOBIN A1C Q6M 1960 FOOT EXAM Q1 3/19/1969 MICROALBUMIN Q1 3/19/1969 EYE EXAM RETINAL OR DILATED Q1 3/19/1969 Pneumococcal 19-64 Medium Risk (1 of 1 - PPSV23) 3/19/1978 DTaP/Tdap/Td series (1 - Tdap) 3/19/1980 COLONOSCOPY 3/19/2014 LIPID PANEL Q1 5/16/2017 Influenza Age 5 to Adult 8/1/2017 MEDICARE YEARLY EXAM 3/14/2018 Allergies as of 3/28/2018  Review Complete On: 3/28/2018 By: Amy Brennan PA-C Severity Noted Reaction Type Reactions Seafood Medium 04/01/2015   Systemic Hives \"deviled-crabs\"  Does fine with all other seafood Aspirin  03/05/2014   Systemic Hives Other reaction(s): facial swelling Tomato  04/28/2014   Systemic Hives Current Immunizations  Never Reviewed Name Date Influenza Vaccine 11/11/2014 Not reviewed this visit You Were Diagnosed With   
  
 Codes Comments  Degeneration of lumbar or lumbosacral intervertebral disc    -  Primary ICD-10-CM: M51.37 
ICD-9-CM: 722.52 Chronic pain syndrome     ICD-10-CM: G89.4 ICD-9-CM: 338.4 Lumbosacral spondylosis without myelopathy     ICD-10-CM: Z67.558 ICD-9-CM: 721.3 Primary osteoarthritis involving multiple joints     ICD-10-CM: M15.0 ICD-9-CM: 715.09 Midline low back pain with left-sided sciatica, unspecified chronicity     ICD-10-CM: M54.42 
ICD-9-CM: 724.3 Cervicalgia     ICD-10-CM: M54.2 ICD-9-CM: 723.1 Vitals BP Pulse Temp Resp Height(growth percentile) Weight(growth percentile) 145/82 (BP 1 Location: Right arm, BP Patient Position: Sitting) 76 97.4 °F (36.3 °C) 16 5' 9\" (1.753 m) 275 lb (124.7 kg) BMI Smoking Status 40.61 kg/m2 Current Every Day Smoker BMI and BSA Data Body Mass Index Body Surface Area  
 40.61 kg/m 2 2.46 m 2 Preferred Pharmacy Pharmacy Name Phone [facility] [address] [phone] Your Updated Medication List  
  
   
This list is accurate as of 3/28/18 10:15 AM.  Always use your most recent med list.  
  
  
  
  
 albuterol 90 mcg/actuation inhaler Commonly known as:  PROVENTIL HFA, VENTOLIN HFA, PROAIR HFA  
2 Puffs every four (4) hours as needed. amLODIPine 10 mg tablet Commonly known as:  [place] Manual Take 1 Tab by mouth daily. ammonium lactate 12 % lotion Commonly known as:  LAC-HYDRIN Apply  to affected area as needed (feet). Azelastine 0.15 % (205.5 mcg) nasal spray Commonly known as:  ASTEPRO  
1 [place] by Both Nostrils route. cyclobenzaprine 5 mg tablet Commonly known as:  FLEXERIL Take 5 mg by mouth three (3) times daily as needed for Muscle Spasm(s). Takes 1 to 2 tablets  
  
 diclofenac 1 % Gel Commonly known as:  VOLTAREN Apply 2 g to affected area four (4) times daily. Apply to painful knees, shoulder, and wrist as needed  
  
 escitalopram oxalate 20 mg tablet Commonly known as:  Dre Villalobos Take 20 mg by mouth daily. fluticasone 50 mcg/actuation nasal spray Commonly known as:  [place] Two squirts both nostrils at bedtime  
  
 gabapentin 300 mg capsule Commonly known as:  NEURONTIN Take 1 Cap by mouth three (3) times daily. For nerve pain. 1 cap qhs for 1 wk, then increase to 1 cap BID for 1 wk, then increase to 1 cap TID  
  
 hydroCHLOROthiazide 12.5 mg tablet Commonly known as:  HYDRODIURIL Take 1 Tab by mouth daily. montelukast 10 mg tablet Commonly known as:  SINGULAIR  
TAKE 1 TABLET BY MOUTH EVERY DAY NovoLOG Mix 70-30FlexPen U-100 100 unit/mL (70-30) Inpn Generic drug:  insulin aspart protamine/insulin aspart Indications: pt adjusts daily * oxyCODONE-acetaminophen 5-325 mg per tablet Commonly known as:  PERCOCET Take 1 Tab by mouth every four (4) hours as needed for Pain. Max Daily Amount: 6 Tabs. * oxyCODONE-acetaminophen  mg per tablet Commonly known as:  PERCOCET Take 1 Tab by mouth four (4) times daily as needed for Pain for up to 30 days. Max Daily Amount: 4 Tabs. for chronic, severe, refractory pain  Indications: Pain * oxyCODONE-acetaminophen  mg per tablet Commonly known as:  PERCOCET Take 1 Tab by mouth four (4) times daily as needed for Pain for up to 30 days. Max Daily Amount: 4 Tabs. for chronic, severe, refractory pain  Indications: Pain Start taking on:  4/8/2018 * oxyCODONE-acetaminophen  mg per tablet Commonly known as:  PERCOCET Take 1 Tab by mouth four (4) times daily as needed for Pain for up to 30 days. Max Daily Amount: 4 Tabs. for chronic, severe, refractory pain  Indications: Pain Start taking on:  5/7/2018 * oxyCODONE-acetaminophen  mg per tablet Commonly known as:  PERCOCET Take 1 Tab by mouth four (4) times daily as needed for Pain for up to 30 days. Max Daily Amount: 4 Tabs. for chronic, severe, refractory pain  Indications: Pain Start taking on:  6/5/2018  
  
 polyethylene glycol 17 gram/dose powder Commonly known as:  [place] Leonel 17 g as needed. pravastatin 40 mg tablet Commonly known as:  PRAVACHOL Take 40 mg by mouth nightly. prazosin 5 mg capsule Commonly known as:  MINIPRESS Take 1 Cap by mouth nightly. tamsulosin 0.4 mg capsule Commonly known as:  FLOMAX Take 1 Cap by mouth daily. topiramate 200 mg tablet Commonly known as:  TOPAMAX Take  by mouth two (2) times a day. Indications: MIGRAINE PREVENTION  
  
 traZODone 150 mg tablet Commonly known as:  [place]ette Grate Take 150 mg by mouth nightly. * trifluoperazine 10 mg tablet Commonly known as:  STELAZINE 10 mg nightly. Indications: SCHIZOPHRENIA  
  
 * trifluoperazine 5 mg tablet Commonly known as:  STELAZINE  
TAKE 1 TABLET BY MOUTH AT BEDTIME TAKE WITH 10 MG TABLET = 15 MG TOTAL DOSE AT BEDTIME  
  
 valsartan 160 mg tablet Commonly known as:  DIOVAN Take 1 Tab by mouth daily. Indications: hypertension XARELTO 15 mg Tab tablet Generic drug:  rivaroxaban Take 15 mg by mouth two (2) times daily (with meals). ziprasidone 80 mg capsule Commonly known as:  GEODON  
daily as needed. * Notice: This list has 7 medication(s) that are the same as other medications prescribed for you. Read the directions carefully, and ask your doctor or other care provider to review them with you. Prescriptions Printed Refills  
 oxyCODONE-acetaminophen (PERCOCET)  mg per tablet 0 Starting on: 6/5/2018 Sig: Take 1 Tab by mouth four (4) times daily as needed for Pain for up to 30 days. Max Daily Amount: 4 Tabs. for chronic, severe, refractory pain  Indications: Pain Class: Print Route: Oral  
 oxyCODONE-acetaminophen (PERCOCET)  mg per tablet 0 Starting on: 5/7/2018  Sig: Take 1 Tab by mouth four (4) times daily as needed for Pain for up to 30 days. Max Daily Amount: 4 Tabs. for chronic, severe, refractory pain  Indications: Pain Class: Print Route: Oral  
 oxyCODONE-acetaminophen (PERCOCET)  mg per tablet 0 Starting on: 4/8/2018 Sig: Take 1 Tab by mouth four (4) times daily as needed for Pain for up to 30 days. Max Daily Amount: 4 Tabs. for chronic, severe, refractory pain  Indications: Pain Class: Print Route: Oral  
  
We Performed the Following AMB SUPPLY ORDER [[id number] Custom] Comments:  
 Patient need cane for balance and stability. Introducing hospitals & HEALTH SERVICES! Radha Angie introduces [employer] patient portal. Now you can access parts of your medical record, email your doctor's office, and request medication refills online. 1. In your internet browser, go to [email or web address]. [email or web address] 2. Click on the First Time User? Click Here link in the Sign In box. You will see the New Member Sign Up page. 3. Enter your [employer] Access Code exactly as it appears below. You will not need to use this code after youve completed the sign-up process. If you do not sign up before the expiration date, you must request a new code. · [employer] Access Code: [email or web address] Expires: 4/12/2018 10:22 AM 
 
4. Enter the last four digits of your Social Security Number (xxxx) and Date of Birth (mm/dd/yyyy) as indicated and click Submit. You will be taken to the next sign-up page. 5. Create a [employer] ID. This will be your [employer] login ID and cannot be changed, so think of one that is secure and easy to remember. 6. Create a [employer] password. You can change your password at any time. 7. Enter your Password Reset Question and Answer. This can be used at a later time if you forget your password. 8. Enter your e-mail address. You will receive e-mail notification when new information is available in [address]. 9. Click Sign Up. You can now view and download portions of your medical record. 10. Click the Download Summary menu link to download a portable copy of your medical information. If you have questions, please visit the Frequently Asked Questions section of the [employer] website. Remember, [employer] is NOT to be used for urgent needs. For medical emergencies, dial 911. Now available from your iPhone and Android! Please provide this summary of care documentation to your next provider. Your primary care clinician is listed as RAMAKRISHNA GROSSMAN. If you have any questions after today's visit, please call [phone].

## 2018-03-28 NOTE — PROGRESS NOTES
Nursing Notes    Patient presents to the office today in follow-up. Patient rates his pain at 8/10 on the numerical pain scale. Reviewed medications with counts as follows:    Rx Date filled Qty Dispensed Pill Count Last Dose Short   Percocet 10 mg 03/10/18 120 55 This am  no         Comments: Patient is here today for a follow up appt today he states he has a pain level of 8 today   He states he was in the ER at [facility] for constipation he states labs were taken and he was given abx  He states he had an inflammed bladder     POC UDS was performed in office today per verbal order per KO    Any new labs or imaging since last appointment? YES    Have you been to an emergency room (ER) or urgent care clinic since your last visit? YES       SPA     Have you been hospitalized since your last visit? NO     If yes, where, when, and reason for visit? Have you seen or consulted any other health care providers outside of the [address] [place] [address]  since your last visit? YES [facility] and Physical therapy      If yes, where, when, and reason for visit? HM deferred to pcp.
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
## 2018-05-07 ENCOUNTER — OFFICE VISIT (OUTPATIENT)
Dept: CARDIOLOGY CLINIC | Age: 58
End: 2018-05-07

## 2018-05-07 VITALS
BODY MASS INDEX: 40.88 KG/M2 | HEART RATE: 82 BPM | SYSTOLIC BLOOD PRESSURE: 130 MMHG | HEIGHT: 69 IN | DIASTOLIC BLOOD PRESSURE: 65 MMHG | WEIGHT: 276 LBS

## 2018-05-07 DIAGNOSIS — I25.10 CORONARY ARTERY DISEASE INVOLVING NATIVE CORONARY ARTERY OF NATIVE HEART WITHOUT ANGINA PECTORIS: ICD-10-CM

## 2018-05-07 DIAGNOSIS — I10 HTN (HYPERTENSION), BENIGN: Primary | ICD-10-CM

## 2018-05-07 DIAGNOSIS — E66.01 SEVERE OBESITY (BMI >= 40) (HCC): ICD-10-CM

## 2018-05-07 DIAGNOSIS — F17.200 TOBACCO DEPENDENCE: ICD-10-CM

## 2018-05-07 DIAGNOSIS — E78.2 MIXED HYPERLIPIDEMIA: ICD-10-CM

## 2018-05-07 RX ORDER — VALSARTAN AND HYDROCHLOROTHIAZIDE 320; 25 MG/1; MG/1
1 TABLET, FILM COATED ORAL DAILY
COMMUNITY
End: 2018-10-31

## 2018-05-07 NOTE — MR AVS SNAPSHOT
[address], [address] [place] [id number] 
[phone] Patient: Andrea Guerra MRN: [id number] TCK:9/36/4701 Visit Information Date & Time Provider Department Dept. Phone Encounter #  
 5/7/2018  9:00 AM Lamar Bedoya MD [facility] [address] [place] [address] [id number] Follow-up Instructions Return in about 1 year (around 5/7/2019), or if symptoms worsen or fail to improve. Your Appointments 5/8/2019  9:30 AM  
ESTABLISHED PATIENT with Andrea Apodaca MD  
[facility]. Jmaie Brice [id number] [facility]) Appt Note: 1 YR F/U  
 [address] [address]  
[phone]  
  
   
 [place] [id number] Upcoming Health Maintenance Date Due Hepatitis C Screening 1960 HEMOGLOBIN A1C Q6M 1960 FOOT EXAM Q1 3/19/1969 MICROALBUMIN Q1 3/19/1969 EYE EXAM RETINAL OR DILATED Q1 3/19/1969 Pneumococcal 19-64 Medium Risk (1 of 1 - PPSV23) 3/19/1978 DTaP/Tdap/Td series (1 - Tdap) 3/19/1980 COLONOSCOPY 3/19/2014 LIPID PANEL Q1 5/16/2017 MEDICARE YEARLY EXAM 3/14/2018 Influenza Age 5 to Adult 8/1/2018 Allergies as of 5/7/2018  Review Complete On: 5/7/2018 By: Lamar Bedoya MD  
  
 Severity Noted Reaction Type Reactions Seafood Medium 04/01/2015   Systemic Hives \"deviled-crabs\"  Does fine with all other seafood Aspirin  03/05/2014   Systemic Hives Other reaction(s): facial swelling Tomato  04/28/2014   Systemic Hives Current Immunizations  Never Reviewed Name Date Influenza Vaccine 11/11/2014 Not reviewed this visit You Were Diagnosed With   
  
 Codes Comments HTN (hypertension), benign    -  Primary ICD-10-CM: I10 
ICD-9-CM: 401.1 5/18 controlled;   
 Coronary artery disease involving native coronary artery of native heart without angina pectoris     ICD-10-CM: I25.10 ICD-9-CM: 414.01 7/17 unlikely CAD; normal stress test; 11/16 stable symptoms Mixed hyperlipidemia     ICD-10-CM: E78.2 ICD-9-CM: 272.2 6/17 [id number], ; 11/16 VYF14, ; 5/16 [id number], ;  
  
 Tobacco dependence     ICD-10-CM: F60.910 ICD-9-CM: 305.1 Severe obesity (BMI >= 40) (HCC)     ICD-10-CM: E66.01 
ICD-9-CM: 278.01 Vitals BP Pulse Height(growth percentile) Weight(growth percentile) BMI Smoking Status 130/65 82 5' 9\" (1.753 m) 276 lb (125.2 kg) 40.76 kg/m2 Current Every Day Smoker Vitals History BMI and BSA Data Body Mass Index Body Surface Area 40.76 kg/m 2 2.47 m 2 Preferred Pharmacy Pharmacy Name Phone [facility][id number] [address] [phone] Your Updated Medication List  
  
   
This list is accurate as of 5/7/18 10:28 AM.  Always use your most recent med list.  
  
  
  
  
 albuterol 90 mcg/actuation inhaler Commonly known as:  PROVENTIL HFA, VENTOLIN HFA, PROAIR HFA  
2 Puffs every four (4) hours as needed. amantadine HCl 100 mg capsule Commonly known as:  SYMMETREL  
TAKE 1 CAPSULE BY MOUTH TWICE DAILY FOR STIFFNESS OR TREMOR  
  
 amLODIPine 10 mg tablet Commonly known as:  [place] Bars Take 1 Tab by mouth daily. ammonium lactate 12 % lotion Commonly known as:  LAC-HYDRIN Apply  to affected area as needed (feet). Azelastine 0.15 % (205.5 mcg) nasal spray Commonly known as:  ASTEPRO  
1 Downs by Both Nostrils route. cromolyn 4 % ophthalmic solution Commonly known as:  OPTICROM INSTILL 1 DROP INTO AFFECTED EYES 4 TIMES PER DAY  
  
 cyclobenzaprine 5 mg tablet Commonly known as:  FLEXERIL Take 5 mg by mouth three (3) times daily as needed for Muscle Spasm(s). Takes 1 to 2 tablets  
  
 dilTIAZem  mg ER capsule Commonly known as:  CARDIZEM CD  
TAKE 1 CAP BY MOUTH DAILY. INDICATIONS: HYPERTENSION  
  
 docusate sodium 100 mg capsule Commonly known as:  COLACE  
TAKE 2 CAPSULES BY MOUTH AT BEDTIME AND 1 CAP IN THE MORNING IF NEEDED FOR CONSTIPATION  
  
 escitalopram oxalate 20 mg tablet Commonly known as:  Lizzette Mealing Take 20 mg by mouth daily. FLECTOR 1.3 % Pt12 Generic drug:  diclofenac APPLY 1 PATCH TO LEFT SHOULDER EVERY 12 HOURS AS NEEDED  
  
 fluticasone 50 mcg/actuation nasal spray Commonly known as:  Ankita Moreno Two squirts both nostrils at bedtime  
  
 gabapentin 300 mg capsule Commonly known as:  NEURONTIN Take 1 Cap by mouth three (3) times daily. For nerve pain. 1 cap qhs for 1 wk, then increase to 1 cap BID for 1 wk, then increase to 1 cap TID LINZESS 145 mcg Cap capsule Generic drug:  linaclotide TAKE 1 CAPSULE(S) EVERY DAY BY ORAL ROUTE.  
  
 montelukast 10 mg tablet Commonly known as:  SINGULAIR  
TAKE 1 TABLET BY MOUTH EVERY DAY NovoLOG Mix 70-30FlexPen U-100 100 unit/mL (70-30) Inpn Generic drug:  insulin aspart protamine/insulin aspart Indications: pt adjusts daily  
  
 nystatin topical cream  
Commonly known as:  MYCOSTATIN  
APPLY TO GROIN 2 TIMES DAILY AS NEEDED. ONETOUCH ULTRA TEST strip Generic drug:  glucose blood VI test strips USED TO CHECK BLOOD SUGAR TWICE A DAY- DX: E11.9  
  
 oxyCODONE-acetaminophen  mg per tablet Commonly known as:  PERCOCET Take 1 Tab by mouth four (4) times daily as needed for Pain for up to 30 days. Max Daily Amount: 4 Tabs. for chronic, severe, refractory pain  Indications: Pain Start taking on:  6/5/2018  
  
 polyethylene glycol 17 gram/dose powder Commonly known as:  Marcellina Dull 17 g as needed. pravastatin 40 mg tablet Commonly known as:  PRAVACHOL Take 40 mg by mouth nightly. prazosin 5 mg capsule Commonly known as:  MINIPRESS Take 1 Cap by mouth nightly. topiramate 200 mg tablet Commonly known as:  TOPAMAX Take  by mouth two (2) times a day.  Indications: MIGRAINE PREVENTION  
  
 traZODone 150 mg tablet Commonly known as:  Sujey Demetria Take 150 mg by mouth nightly. * trifluoperazine 10 mg tablet Commonly known as:  STELAZINE 10 mg nightly. Indications: SCHIZOPHRENIA  
  
 * trifluoperazine 5 mg tablet Commonly known as:  STELAZINE  
TAKE 1 TABLET BY MOUTH AT BEDTIME TAKE WITH 10 MG TABLET = 15 MG TOTAL DOSE AT BEDTIME  
  
 valsartan-hydroCHLOROthiazide 320-25 mg per tablet Commonly known as:  DIOVAN-HCT Take 1 Tab by mouth daily. XARELTO 15 mg Tab tablet Generic drug:  rivaroxaban Take 10 mg by mouth daily (with breakfast). ziprasidone 80 mg capsule Commonly known as:  GEODON  
daily as needed. * Notice: This list has 2 medication(s) that are the same as other medications prescribed for you. Read the directions carefully, and ask your doctor or other care provider to review them with you. Follow-up Instructions Return in about 1 year (around 5/7/2019), or if symptoms worsen or fail to improve. Patient Instructions Medications Discontinued During This Encounter Medication Reason  benzonatate (TESSALON) 200 mg capsule Therapy Completed  valsartan-hydroCHLOROthiazide (DIOVAN-HCT) 320-25 mg per tablet Duplicate Order  valsartan (DIOVAN) 160 mg tablet Not A Current Medication  hydroCHLOROthiazide (HYDRODIURIL) 12.5 mg tablet Alternate Therapy  diclofenac (VOLTAREN) 1 % gel Not A Current Medication Body Mass Index: Care Instructions Your Care Instructions Body mass index (BMI) can help you see if your weight is raising your risk for health problems. It uses a formula to compare how much you weigh with how tall you are. · A BMI lower than 18.5 is considered underweight. · A BMI between 18.5 and 24.9 is considered healthy. · A BMI between 25 and 29.9 is considered overweight. A BMI of 30 or higher is considered obese.  
If your BMI is in the normal range, it means that you have a lower risk for weight-related health problems. If your BMI is in the overweight or obese range, you may be at increased risk for weight-related health problems, such as high blood pressure, heart disease, stroke, arthritis or joint pain, and diabetes. If your BMI is in the underweight range, you may be at increased risk for health problems such as fatigue, lower protection (immunity) against illness, muscle loss, bone loss, hair loss, and hormone problems. BMI is just one measure of your risk for weight-related health problems. You may be at higher risk for health problems if you are not active, you eat an unhealthy diet, or you drink too much alcohol or use tobacco products. Follow-up care is a key part of your treatment and safety. Be sure to make and go to all appointments, and call your doctor if you are having problems. It's also a good idea to know your test results and keep a list of the medicines you take. How can you care for yourself at home? · Practice healthy eating habits. This includes eating plenty of fruits, vegetables, whole grains, lean protein, and low-fat dairy. · If your doctor recommends it, get more exercise. Walking is a good choice. Bit by bit, increase the amount you walk every day. Try for at least 30 minutes on most days of the week. · Do not smoke. Smoking can increase your risk for health problems. If you need help quitting, talk to your doctor about stop-smoking programs and medicines. These can increase your chances of quitting for good. · Limit alcohol to 2 drinks a day for men and 1 drink a day for women. Too much alcohol can cause health problems. If you have a BMI higher than 25 · Your doctor may do other tests to check your risk for weight-related health problems. This may include measuring the distance around your waist. A waist measurement of more than 40 inches in men or 35 inches in women can increase the risk of weight-related health problems. · Talk with your doctor about steps you can take to stay healthy or improve your health. You may need to make lifestyle changes to lose weight and stay healthy, such as changing your diet and getting regular exercise. If you have a BMI lower than 18.5 · Your doctor may do other tests to check your risk for health problems. · Talk with your doctor about steps you can take to stay healthy or improve your health. You may need to make lifestyle changes to gain or maintain weight and stay healthy, such as getting more healthy foods in your diet and doing exercises to build muscle. Where can you learn more? Go to http://bailey-cristian.info/. Enter S176 in the search box to learn more about \"Body Mass Index: Care Instructions. \" Current as of: October 13, 2016 Content Version: 11.4 © 1946-0234 Ring. Care instructions adapted under license by [employer] (which disclaims liability or warranty for this information). If you have questions about a medical condition or this instruction, always ask your healthcare professional. [address] any warranty or liability for your use of this information. Introducing [facility]! Jayson Anand introduces Viewpoints patient portal. Now you can access parts of your medical record, email your doctor's office, and request medication refills online. 1. In your internet browser, go to [email or web address] 2. Click on the First Time User? Click Here link in the Sign In box. You will see the New Member Sign Up page. 3. Enter your Viewpoints Access Code exactly as it appears below. You will not need to use this code after youve completed the sign-up process. If you do not sign up before the expiration date, you must request a new code. · Viewpoints Access Code: [email or web address] Expires: 8/2/2018 11:15 AM 
 
4.  Enter the last four digits of your Social Security Number (xxxx) and Date of Birth (mm/dd/yyyy) as indicated and click Submit. You will be taken to the next sign-up page. 5. Create a [employer] ID. This will be your [employer] login ID and cannot be changed, so think of one that is secure and easy to remember. 6. Create a [employer] password. You can change your password at any time. 7. Enter your Password Reset Question and Answer. This can be used at a later time if you forget your password. 8. Enter your e-mail address. You will receive e-mail notification when new information is available in [address] 9. Click Sign Up. You can now view and download portions of your medical record. 10. Click the Download Summary menu link to download a portable copy of your medical information. If you have questions, please visit the Frequently Asked Questions section of the [employer] website. Remember, [employer] is NOT to be used for urgent needs. For medical emergencies, dial 911. Now available from your iPhone and Android! Please provide this summary of care documentation to your next provider. Your primary care clinician is listed as RAMAKRISHNA GROSSMAN. If you have any questions after today's visit, please call [phone].

## 2018-05-07 NOTE — PROGRESS NOTES
Patient didn't bring medications, verbally reviewed    1. Have you been to the ER, urgent care clinic since your last visit? Hospitalized since your last visit? Yes When: 5/18 Where: [facility] Reason for visit: CP    2. Have you seen or consulted any other health care providers outside of the [facility] since your last visit? Include any pap smears or colon screening. Yes Where: PCP Routine     3. Since your last visit, have you had any of the following symptoms? chest pains, shortness of breath and dizziness. 4.  Have you had any blood work, X-rays or cardiac testing? Yes Where: [facility]     Requested: NO     In [facility]: YES    5. Where do you normally have your labs drawn? [facility]    6. Do you need any refills today?    No

## 2018-05-07 NOTE — PROGRESS NOTES
HISTORY OF PRESENT ILLNESS  Liberty Cartagena is a 60 y.o. male. Hypertension   The history is provided by the patient and medical records. This is a chronic problem. Pertinent negatives include no chest pain, no headaches and no shortness of breath. Cholesterol Problem   The history is provided by the medical records. This is a chronic problem. Pertinent negatives include no chest pain, no headaches and no shortness of breath. Hypotension   The history is provided by the medical records and patient (intraop in 7/17; TURP cancelled). Pertinent negatives include no chest pain, no headaches and no shortness of breath. Dizziness   The history is provided by the patient. This is a recurrent problem. The current episode started more than 1 week ago. The problem occurs rarely. The problem has not changed since onset. Pertinent negatives include no chest pain, no headaches and no shortness of breath. The symptoms are aggravated by standing. Review of Systems   Constitutional: Negative for chills, diaphoresis, fever, malaise/fatigue and weight loss. HENT: Negative for nosebleeds. Eyes: Negative for discharge. Respiratory: Negative for cough, shortness of breath and wheezing. Cardiovascular: Negative for chest pain, palpitations, orthopnea, claudication, leg swelling and PND. Gastrointestinal: Negative for diarrhea, nausea and vomiting. Genitourinary: Negative for dysuria and hematuria. Musculoskeletal: Negative for joint pain. Skin: Negative for rash. Neurological: Positive for dizziness. Negative for seizures, loss of consciousness and headaches. Endo/Heme/Allergies: Negative for polydipsia. Does not bruise/bleed easily. Psychiatric/Behavioral: Negative for depression and substance abuse. The patient does not have insomnia.       Allergies   Allergen Reactions    Seafood Hives     \"deviled-crabs\"  Does fine with all other seafood    Aspirin Hives     Other reaction(s): facial swelling     Tomato Hives       Past Medical History:   Diagnosis Date    Adverse effect of anesthesia     sx cx due to blood pressure drop    Allergic rhinitis     Asthma     Atherosclerosis of native coronary artery with angina pectoris ([address] 5/14/2015    atypical angina, mild stress abnormality may be artifact nl EF by echo     Benign non-nodular prostatic hyperplasia with lower urinary tract symptoms     BPH with obstruction/lower urinary tract symptoms     Bulbous urethral stricture     Chronic epididymitis     Chronic pain     shoulder, neck, knee    Coronary artery disease involving native coronary artery of native heart with angina pectoris ([address] 5/13/2016    chr atypical CP for few seconds only     Diabetes mellitus ([address]     DM (diabetes mellitus) ([address]     DVT (deep venous thrombosis) ([address] 2013    left leg    Emphysema     Epididymal cyst     Essential hypertension     GERD (gastroesophageal reflux disease)     High cholesterol     Hypertension     Impotence     Incontinence     Intermittent urinary stream     Lower urinary tract symptoms (LUTS)     Morbid obesity ([address] 4/1/2015    Multiple trauma     LEFT SHOULDER, LEFT WRIST, NECK, AND BACK    Other and unspecified hyperlipidemia 4/1/2015    Paranoid schizophrenia ([address]     Preop cardiovascular exam 11/13/2015    wrist surgery; no significant CP; abnl stress ? artifact cleared with mild risk periop     Sciatica     Severe obesity (BMI >= 40) [facility]) 11/13/2015    Sinusitis     Spermatocele of epididymis     Type II or unspecified type diabetes mellitus without mention of complication, not stated as uncontrolled 4/1/2015    Urethral stricture     Urinary urgency     Urine leukocytes     Weak urinary stream        Family History   Problem Relation Age of Onset    Stroke Mother 79    Heart Disease Mother     Diabetes Father     Cancer Brother     Heart Attack Neg Hx        Social History   Substance Use Topics    Smoking status: Current Every Day Smoker     Packs/day: 0.25     Years: 32.00     Types: Cigarettes     Start date: 8/1/1976    Smokeless tobacco: Former User      Comment: per patient stated 1 or 2 a day ready to stop    Alcohol use No        Current Outpatient Prescriptions   Medication Sig    valsartan-hydroCHLOROthiazide (DIOVAN-HCT) 320-25 mg per tablet Take 1 Tab by mouth daily.  amantadine HCl (SYMMETREL) 100 mg capsule TAKE 1 CAPSULE BY MOUTH TWICE DAILY FOR STIFFNESS OR TREMOR    ONETOUCH ULTRA TEST strip USED TO CHECK BLOOD SUGAR TWICE A DAY- DX: E11.9    cromolyn (OPTICROM) 4 % ophthalmic solution INSTILL 1 DROP INTO AFFECTED EYES 4 TIMES PER DAY    FLECTOR 1.3 % pt12 APPLY 1 PATCH TO LEFT SHOULDER EVERY 12 HOURS AS NEEDED    dilTIAZem CD (CARDIZEM CD) 240 mg ER capsule TAKE 1 CAP BY MOUTH DAILY. INDICATIONS: HYPERTENSION    docusate sodium (COLACE) 100 mg capsule TAKE 2 CAPSULES BY MOUTH AT BEDTIME AND 1 CAP IN THE MORNING IF NEEDED FOR CONSTIPATION    LINZESS 145 mcg cap capsule TAKE 1 CAPSULE(S) EVERY DAY BY ORAL ROUTE.  nystatin (MYCOSTATIN) topical cream APPLY TO GROIN 2 TIMES DAILY AS NEEDED.  [START ON 6/5/2018] oxyCODONE-acetaminophen (PERCOCET)  mg per tablet Take 1 Tab by mouth four (4) times daily as needed for Pain for up to 30 days. Max Daily Amount: 4 Tabs. for chronic, severe, refractory pain  Indications: Pain    rivaroxaban (XARELTO) 15 mg tab tablet Take 10 mg by mouth daily (with breakfast).  amLODIPine (NORVASC) 10 mg tablet Take 1 Tab by mouth daily.  pravastatin (PRAVACHOL) 40 mg tablet Take 40 mg by mouth nightly.  prazosin (MINIPRESS) 5 mg capsule Take 1 Cap by mouth nightly.  gabapentin (NEURONTIN) 300 mg capsule Take 1 Cap by mouth three (3) times daily. For nerve pain.  1 cap qhs for 1 wk, then increase to 1 cap BID for 1 wk, then increase to 1 cap TID    montelukast (SINGULAIR) 10 mg tablet TAKE 1 TABLET BY MOUTH EVERY DAY    trifluoperazine (STELAZINE) 5 mg tablet TAKE 1 TABLET BY MOUTH AT BEDTIME TAKE WITH 10 MG TABLET = 15 MG TOTAL DOSE AT BEDTIME    trifluoperazine (STELAZINE) 10 mg tablet 10 mg nightly. Indications: SCHIZOPHRENIA    NOVOLOG MIX 70-30 FLEXPEN 100 unit/mL (70-30) inpn Indications: pt adjusts daily    albuterol (PROVENTIL HFA, VENTOLIN HFA, PROAIR HFA) 90 mcg/actuation inhaler 2 Puffs every four (4) hours as needed.  ammonium lactate (LAC-HYDRIN) 12 % lotion Apply  to affected area as needed (feet).  Azelastine (ASTEPRO) 0.15 % (205.5 mcg) nasal spray 1 [place] by Both Nostrils route.  escitalopram oxalate (LEXAPRO) 20 mg tablet Take 20 mg by mouth daily.  polyethylene glycol (MIRALAX) 17 gram/dose powder 17 g as needed.  ziprasidone (GEODON) 80 mg capsule daily as needed.  fluticasone (FLONASE) 50 mcg/actuation nasal spray Two squirts both nostrils at bedtime    cyclobenzaprine (FLEXERIL) 5 mg tablet Take 5 mg by mouth three (3) times daily as needed for Muscle Spasm(s). Takes 1 to 2 tablets    topiramate (TOPAMAX) 200 mg tablet Take  by mouth two (2) times a day. Indications: MIGRAINE PREVENTION    traZODone (DESYREL) 150 mg tablet Take 150 mg by mouth nightly. No current facility-administered medications for this visit. Past Surgical History:   Procedure Laterality Date    HX CARPAL TUNNEL RELEASE Left     HX HEENT  2017    sinus surgery    HX OTHER SURGICAL  2008    sinus    HX OTHER SURGICAL  2009    stress test    HX OTHER SURGICAL  03/31/2016    EPIDURAL INJECTIONS    HX SHOULDER ARTHROSCOPY Left     [facility]  09/12/2017    [address], Cysto, direct visualized internal urethrotomy, Dr. Jett Rolon       Diagnostic Studies:  CARDIOLOGY STUDIES 4/8/2015 4/3/2015   Exercise Nuclear Stress Test Result - sev HTN response, 39%EF, antbasal/apical ischemia? artifact from arm   Echocardiogram - Complete Result 55%EF, mild DD/dil LA -   Some recent data might be hidden   7/17 Nuc Stress  IMPRESSION:     1. No evidence of ischemia. 2.  Ejection fraction calculated at 33%    7/17 ECHO  1. Normal LV size and systolic function. EF ~ 55%  2. Grade I diastolic dysfunction. 3. No significant valvular pathology. Visit Vitals    /65    Pulse 82    Ht 5' 9\" (1.753 m)    Wt 276 lb (125.2 kg)    BMI 40.76 kg/m2       Mr. Atiya Brown has a reminder for a \"due or due soon\" health maintenance. I have asked that he contact his primary care provider for follow-up on this health maintenance. Physical Exam   Constitutional: He is oriented to person, place, and time. He appears well-developed and well-nourished. No distress. HENT:   Head: Normocephalic and atraumatic. Mouth/Throat: Normal dentition. Eyes: Right eye exhibits no discharge. Left eye exhibits no discharge. No scleral icterus. Neck: Neck supple. No JVD present. Carotid bruit is not present. No thyromegaly present. Cardiovascular: Normal rate, regular rhythm, S1 normal, S2 normal, normal heart sounds and intact distal pulses. Exam reveals no gallop and no friction rub. No murmur heard. Pulmonary/Chest: Effort normal and breath sounds normal. He has no wheezes. He has no rales. Abdominal: Soft. He exhibits no mass. There is no tenderness. Musculoskeletal: He exhibits edema (trace). Lymphadenopathy:        Right cervical: No superficial cervical adenopathy present. Left cervical: No superficial cervical adenopathy present. Neurological: He is alert and oriented to person, place, and time. Skin: Skin is warm and dry. No rash noted. Psychiatric: He has a normal mood and affect. His behavior is normal.       ASSESSMENT and PLAN    Discussed the patient's above normal BMI with him. I have recommended the following interventions: dietary management education, guidance, and counseling .   The BMI follow up plan is as follows: BMI is out of normal parameters and plan is as follows: I have counseled this patient on diet and exercise regimens    Patient advised to stop smoking to reduce future cardiovascular events. Diagnoses and all orders for this visit:    1. HTN (hypertension), benign  Comments:  5/18 controlled;     2. Coronary artery disease involving native coronary artery of native heart without angina pectoris  Comments:  7/17 unlikely CAD; normal stress test; 11/16 stable symptoms      3. Mixed hyperlipidemia  Comments:  6/17 RFD54, ; 11/16 CJI48, ; 5/16 [id number], ;       4. Tobacco dependence    5. Severe obesity (BMI >= 40) ([facility])        Pertinent laboratory and test data reviewed and discussed with patient. See patient instructions also for other medical advice given    Medications Discontinued During This Encounter   Medication Reason    benzonatate (TESSALON) 200 mg capsule Therapy Completed    valsartan-hydroCHLOROthiazide (DIOVAN-HCT) 320-25 mg per tablet Duplicate Order    valsartan (DIOVAN) 160 mg tablet Not A Current Medication    hydroCHLOROthiazide (HYDRODIURIL) 12.5 mg tablet Alternate Therapy    diclofenac (VOLTAREN) 1 % gel Not A Current Medication       Follow-up Disposition:  Return in about 1 year (around 5/7/2019), or if symptoms worsen or fail to improve.

## 2018-05-07 NOTE — LETTER
Bhakti Fried 1960 
 
5/7/2018 Dear Bernarda Low MD 
 
I had the pleasure of evaluating  Mr. Toan Colvin in office today. Below are the relevant portions of my assessment and plan of care. ICD-10-CM ICD-9-CM 1. HTN (hypertension), benign I10 401.1 5/18 controlled; 2. Coronary artery disease involving native coronary artery of native heart without angina pectoris I25.10 414.01   
 7/17 unlikely CAD; normal stress test; 11/16 stable symptoms 3. Mixed hyperlipidemia E78.2 272.2 6/17 OCX01, ; 11/16 EVM71, ; 5/16 HAG326, ;  
  
4. Tobacco dependence F17.200 305.1 5. Severe obesity (BMI >= 40) ([facility]) E66.01 278.01   
 
 
Current Outpatient Prescriptions Medication Sig Dispense Refill  valsartan-hydroCHLOROthiazide (DIOVAN-HCT) 320-25 mg per tablet Take 1 Tab by mouth daily.  amantadine HCl (SYMMETREL) 100 mg capsule TAKE 1 CAPSULE BY MOUTH TWICE DAILY FOR STIFFNESS OR TREMOR  2  
 ONETOUCH ULTRA TEST strip USED TO CHECK BLOOD SUGAR TWICE A DAY- DX: E11.9  3  
 cromolyn (OPTICROM) 4 % ophthalmic solution INSTILL 1 DROP INTO AFFECTED EYES 4 TIMES PER DAY  3  
 FLECTOR 1.3 % pt12 APPLY 1 PATCH TO LEFT SHOULDER EVERY 12 HOURS AS NEEDED  4  
 dilTIAZem CD (CARDIZEM CD) 240 mg ER capsule TAKE 1 CAP BY MOUTH DAILY. INDICATIONS: HYPERTENSION  3  
 docusate sodium (COLACE) 100 mg capsule TAKE 2 CAPSULES BY MOUTH AT BEDTIME AND 1 CAP IN THE MORNING IF NEEDED FOR CONSTIPATION  2  
 LINZESS 145 mcg cap capsule TAKE 1 CAPSULE(S) EVERY DAY BY ORAL ROUTE.  2  
 nystatin (MYCOSTATIN) topical cream APPLY TO GROIN 2 TIMES DAILY AS NEEDED. 2  
 [START ON 6/5/2018] oxyCODONE-acetaminophen (PERCOCET)  mg per tablet Take 1 Tab by mouth four (4) times daily as needed for Pain for up to 30 days. Max Daily Amount: 4 Tabs. for chronic, severe, refractory pain  Indications: Pain 120 Tab 0  
 rivaroxaban (XARELTO) 15 mg tab tablet Take 10 mg by mouth daily (with breakfast).  amLODIPine (NORVASC) 10 mg tablet Take 1 Tab by mouth daily. 90 Tab 1  pravastatin (PRAVACHOL) 40 mg tablet Take 40 mg by mouth nightly.  prazosin (MINIPRESS) 5 mg capsule Take 1 Cap by mouth nightly. 90 Cap 3  
 gabapentin (NEURONTIN) 300 mg capsule Take 1 Cap by mouth three (3) times daily. For nerve pain. 1 cap qhs for 1 wk, then increase to 1 cap BID for 1 wk, then increase to 1 cap TID 90 Cap 5  
 montelukast (SINGULAIR) 10 mg tablet TAKE 1 TABLET BY MOUTH EVERY DAY  5  
 trifluoperazine (STELAZINE) 5 mg tablet TAKE 1 TABLET BY MOUTH AT BEDTIME TAKE WITH 10 MG TABLET = 15 MG TOTAL DOSE AT BEDTIME  2  
 trifluoperazine (STELAZINE) 10 mg tablet 10 mg nightly. Indications: SCHIZOPHRENIA  2  
 NOVOLOG MIX 70-30 FLEXPEN 100 unit/mL (70-30) inpn Indications: pt adjusts daily  3  
 albuterol (PROVENTIL HFA, VENTOLIN HFA, PROAIR HFA) 90 mcg/actuation inhaler 2 Puffs every four (4) hours as needed.  ammonium lactate (LAC-HYDRIN) 12 % lotion Apply  to affected area as needed (feet).  Azelastine (ASTEPRO) 0.15 % (205.5 mcg) nasal spray 1 [place] by Both Nostrils route.  escitalopram oxalate (LEXAPRO) 20 mg tablet Take 20 mg by mouth daily.  polyethylene glycol (MIRALAX) 17 gram/dose powder 17 g as needed.  ziprasidone (GEODON) 80 mg capsule daily as needed.  fluticasone (FLONASE) 50 mcg/actuation nasal spray Two squirts both nostrils at bedtime 1 Bottle 3  cyclobenzaprine (FLEXERIL) 5 mg tablet Take 5 mg by mouth three (3) times daily as needed for Muscle Spasm(s). Takes 1 to 2 tablets  topiramate (TOPAMAX) 200 mg tablet Take  by mouth two (2) times a day. Indications: MIGRAINE PREVENTION    
 traZODone (DESYREL) 150 mg tablet Take 150 mg by mouth nightly. Orders Placed This Encounter  valsartan-hydroCHLOROthiazide (DIOVAN-HCT) 320-25 mg per tablet Sig: Take 1 Tab by mouth daily. If you have questions, please do not hesitate to call me. I look forward to following Mr. Tatiana Koroma along with you. Sincerely, Charisse Mcconnell MD

## 2018-05-07 NOTE — PATIENT INSTRUCTIONS
Medications Discontinued During This Encounter   Medication Reason    benzonatate (TESSALON) 200 mg capsule Therapy Completed    valsartan-hydroCHLOROthiazide (DIOVAN-HCT) 320-25 mg per tablet Duplicate Order    valsartan (DIOVAN) 160 mg tablet Not A Current Medication    hydroCHLOROthiazide (HYDRODIURIL) 12.5 mg tablet Alternate Therapy    diclofenac (VOLTAREN) 1 % gel Not A Current Medication          Body Mass Index: Care Instructions  Your Care Instructions    Body mass index (BMI) can help you see if your weight is raising your risk for health problems. It uses a formula to compare how much you weigh with how tall you are. · A BMI lower than 18.5 is considered underweight. · A BMI between 18.5 and 24.9 is considered healthy. · A BMI between 25 and 29.9 is considered overweight. A BMI of 30 or higher is considered obese. If your BMI is in the normal range, it means that you have a lower risk for weight-related health problems. If your BMI is in the overweight or obese range, you may be at increased risk for weight-related health problems, such as high blood pressure, heart disease, stroke, arthritis or joint pain, and diabetes. If your BMI is in the underweight range, you may be at increased risk for health problems such as fatigue, lower protection (immunity) against illness, muscle loss, bone loss, hair loss, and hormone problems. BMI is just one measure of your risk for weight-related health problems. You may be at higher risk for health problems if you are not active, you eat an unhealthy diet, or you drink too much alcohol or use tobacco products. Follow-up care is a key part of your treatment and safety. Be sure to make and go to all appointments, and call your doctor if you are having problems. It's also a good idea to know your test results and keep a list of the medicines you take. How can you care for yourself at home? · Practice healthy eating habits.  This includes eating plenty of fruits, vegetables, whole grains, lean protein, and low-fat dairy. · If your doctor recommends it, get more exercise. Walking is a good choice. Bit by bit, increase the amount you walk every day. Try for at least 30 minutes on most days of the week. · Do not smoke. Smoking can increase your risk for health problems. If you need help quitting, talk to your doctor about stop-smoking programs and medicines. These can increase your chances of quitting for good. · Limit alcohol to 2 drinks a day for men and 1 drink a day for women. Too much alcohol can cause health problems. If you have a BMI higher than 25  · Your doctor may do other tests to check your risk for weight-related health problems. This may include measuring the distance around your waist. A waist measurement of more than 40 inches in men or 35 inches in women can increase the risk of weight-related health problems. · Talk with your doctor about steps you can take to stay healthy or improve your health. You may need to make lifestyle changes to lose weight and stay healthy, such as changing your diet and getting regular exercise. If you have a BMI lower than 18.5  · Your doctor may do other tests to check your risk for health problems. · Talk with your doctor about steps you can take to stay healthy or improve your health. You may need to make lifestyle changes to gain or maintain weight and stay healthy, such as getting more healthy foods in your diet and doing exercises to build muscle. Where can you learn more? Go to http://bailey-cristian.info/. Enter S176 in the search box to learn more about \"Body Mass Index: Care Instructions. \"  Current as of: October 13, 2016  Content Version: 11.4  © 0757-3251 [employer]. Care instructions adapted under license by [employer] (which disclaims liability or warranty for this information).  If you have questions about a medical condition or this instruction, always ask your healthcare professional. [place] [address] any warranty or liability for your use of this information.

## 2018-06-13 ENCOUNTER — OFFICE VISIT (OUTPATIENT)
Dept: PAIN MANAGEMENT | Age: 58
End: 2018-06-13

## 2018-06-13 VITALS
SYSTOLIC BLOOD PRESSURE: 132 MMHG | DIASTOLIC BLOOD PRESSURE: 75 MMHG | HEIGHT: 69 IN | HEART RATE: 74 BPM | TEMPERATURE: 97.4 F | BODY MASS INDEX: 40.29 KG/M2 | WEIGHT: 272 LBS

## 2018-06-13 DIAGNOSIS — M47.817 LUMBOSACRAL SPONDYLOSIS WITHOUT MYELOPATHY: ICD-10-CM

## 2018-06-13 DIAGNOSIS — M51.37 DEGENERATION OF LUMBAR OR LUMBOSACRAL INTERVERTEBRAL DISC: ICD-10-CM

## 2018-06-13 DIAGNOSIS — M25.512 PAIN IN JOINT OF LEFT SHOULDER: ICD-10-CM

## 2018-06-13 DIAGNOSIS — G89.4 CHRONIC PAIN SYNDROME: ICD-10-CM

## 2018-06-13 DIAGNOSIS — M54.12 CERVICAL RADICULOPATHY: ICD-10-CM

## 2018-06-13 DIAGNOSIS — M54.16 LUMBAR NEURITIS: ICD-10-CM

## 2018-06-13 DIAGNOSIS — M15.9 PRIMARY OSTEOARTHRITIS INVOLVING MULTIPLE JOINTS: ICD-10-CM

## 2018-06-13 DIAGNOSIS — E66.01 SEVERE OBESITY (BMI >= 40) (HCC): ICD-10-CM

## 2018-06-13 DIAGNOSIS — M54.2 CERVICALGIA: Primary | ICD-10-CM

## 2018-06-13 DIAGNOSIS — M54.42 MIDLINE LOW BACK PAIN WITH LEFT-SIDED SCIATICA, UNSPECIFIED CHRONICITY: ICD-10-CM

## 2018-06-13 RX ORDER — OXYCODONE AND ACETAMINOPHEN 10; 325 MG/1; MG/1
1 TABLET ORAL
Qty: 120 TAB | Refills: 0 | Status: SHIPPED | OUTPATIENT
Start: 2018-07-03 | End: 2018-08-01 | Stop reason: SDUPTHER

## 2018-06-13 RX ORDER — ARM BRACE
1 EACH MISCELLANEOUS DAILY
Qty: 1 EACH | Refills: 0 | Status: SHIPPED | OUTPATIENT
Start: 2018-06-13

## 2018-06-13 NOTE — PROGRESS NOTES
Nursing Notes    Patient presents to the office today in follow-up. Patient rates his pain at 8/10 on the numerical pain scale. Reviewed medications with counts as follows:    Rx Date filled Qty Dispensed Pill Count Last Dose Short   Oxycodone 10-325mg 06/05/18 120 87 today no                                  Comments:     POC UDS was not performed in office today    Any new labs or imaging since last appointment? yes  lab    Have you been to an emergency room (ER) or urgent care clinic since your last visit? YES ,kavon HAN chest pain           Have you been hospitalized since your last visit? YES  [facility]   If yes, where, when, and reason for visit? Have you seen or consulted any other health care providers outside of the [facility]  since your last visit? YES     If yes, where, when, and reason for visit? Mr. Diggs Dad has a reminder for a \"due or due soon\" health maintenance. I have asked that he contact his primary care provider for follow-up on this health maintenance.
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
## 2018-06-25 ENCOUNTER — HOSPITAL ENCOUNTER (OUTPATIENT)
Dept: GENERAL RADIOLOGY | Age: 58
Discharge: HOME | End: 2018-06-25
Payer: MEDICARE

## 2018-06-25 DIAGNOSIS — R07.81 RIB PAIN: ICD-10-CM

## 2018-06-25 PROCEDURE — 71100 X-RAY EXAM RIBS UNI 2 VIEWS: CPT

## 2018-08-01 ENCOUNTER — CLINICAL SUPPORT (OUTPATIENT)
Dept: PAIN MANAGEMENT | Age: 58
End: 2018-08-01

## 2018-08-01 VITALS
HEART RATE: 79 BPM | HEIGHT: 69 IN | DIASTOLIC BLOOD PRESSURE: 89 MMHG | SYSTOLIC BLOOD PRESSURE: 153 MMHG | TEMPERATURE: 97.5 F | WEIGHT: 272 LBS | RESPIRATION RATE: 18 BRPM | BODY MASS INDEX: 40.29 KG/M2

## 2018-08-01 DIAGNOSIS — G89.4 CHRONIC PAIN SYNDROME: Primary | ICD-10-CM

## 2018-08-01 NOTE — PROGRESS NOTES
Nursing Notes  Patient took /89. Patient asymptomatic. Patient took BP medication today. Patient presents to the office today in follow-up. Patient rates his pain at 9/10 on the numerical pain scale. Reviewed medications with counts as follows:    Rx Date filled Qty Dispensed Pill Count Last Dose Short   Percocet  mg tab 7/3/18 120 0 today No                                          Comments:     POC UDS was not performed in office today    Any new labs or imaging since last appointment? Have you been to an emergency room (ER) or urgent care clinic since your last visit? YES, ER            Have you been hospitalized since your last visit? NO     If yes, where, when, and reason for visit? Have you seen or consulted any other health care providers outside of the [address] [place] [address]  since your last visit? [address], Pain Management, and PCP. If yes, where, when, and reason for visit? Mr. Sammy Wagner has a reminder for a \"due or due soon\" health maintenance. I have asked that he contact his primary care provider for follow-up on this health maintenance. PHQ over the last two weeks 8/1/2018   PHQ Not Done -   Little interest or pleasure in doing things Not at all   Feeling down, depressed, irritable, or hopeless Not at all   Total Score PHQ 2 0     Patient identity verified using 2 patient identifiers. Group education topic covered:  Nurse Visit    Questions regarding group topic? None     Aberrances present on  report pulled? ( If so, please describe.)  no    Patient contact number: [phone]    What percentage of relief does the pain medication(s) provide? (Between 0-100) Patient reports 40-50 % pain relief. Are you able to perform your daily tasks when taking your medication? (Such as: Personal Hygiene, Cooking, Cleaning) : yes    Are you having any side effects from your medication? (Ex; nausea, vomiting,constipation) Some constipation.   What relieves the side effects? Patient takes Cleda Clore. Questions regarding one one one discussion?  None

## 2018-08-17 NOTE — TELEPHONE ENCOUNTER
Brendon Hicks from [address] [facility] called on behalf of the patient, [place] requesting a refill for Gabapentin 300 mg cap. Last prescription was written by Radha Celeste PA-C on 9/11/17 with 5 refills. [facility] shows last fill date as 5/15/18. Patient was last seen by Valdez Salguero on 6/13/18, but came for a nurse visit on 8/1/18.  Patient has a follow up appointment scheduled with RAZ Salazar.

## 2018-08-29 ENCOUNTER — TELEPHONE (OUTPATIENT)
Dept: PAIN MANAGEMENT | Age: 58
End: 2018-08-29

## 2018-08-29 DIAGNOSIS — M47.817 LUMBOSACRAL SPONDYLOSIS WITHOUT MYELOPATHY: ICD-10-CM

## 2018-08-29 DIAGNOSIS — G89.4 CHRONIC PAIN SYNDROME: ICD-10-CM

## 2018-08-29 DIAGNOSIS — M54.42 MIDLINE LOW BACK PAIN WITH LEFT-SIDED SCIATICA, UNSPECIFIED CHRONICITY: ICD-10-CM

## 2018-08-29 DIAGNOSIS — M51.37 DEGENERATION OF LUMBAR OR LUMBOSACRAL INTERVERTEBRAL DISC: ICD-10-CM

## 2018-08-29 RX ORDER — OXYCODONE AND ACETAMINOPHEN 10; 325 MG/1; MG/1
1 TABLET ORAL
Qty: 105 TAB | Refills: 0 | Status: SHIPPED | OUTPATIENT
Start: 2018-08-29 | End: 2018-09-12 | Stop reason: SDUPTHER

## 2018-08-29 NOTE — TELEPHONE ENCOUNTER
Patient stated he call last week and this week for a medication refill(percocet 10mg and oxycodone    Patient stated his medication will end 08/30/18    50-60% relief  Yes able to perform  No side effects

## 2018-08-30 DIAGNOSIS — I10 HTN (HYPERTENSION), BENIGN: ICD-10-CM

## 2018-08-30 RX ORDER — VALSARTAN AND HYDROCHLOROTHIAZIDE 320; 25 MG/1; MG/1
TABLET, FILM COATED ORAL
Qty: 90 TAB | Refills: 1 | Status: SHIPPED | OUTPATIENT
Start: 2018-08-30 | End: 2018-12-10 | Stop reason: SDUPTHER

## 2018-09-11 ENCOUNTER — TELEPHONE (OUTPATIENT)
Dept: PAIN MANAGEMENT | Age: 58
End: 2018-09-11

## 2018-09-11 NOTE — TELEPHONE ENCOUNTER
Contacted patient regarding his upcoming appt with our office on , offered for patient to  his prescription tomorrow between 10am-1pm due to the impending weather. Patient agrees to present in office with valid photo ID and understands he will be contacted at a later date to reschedule his appointment. The patient did relay that he is working on transitioning his care to Dr. Dima Blackwell, but will be sure to notify of us of any changes. Name,  Verified  Phone number   Oxycodone provides 50-60% relief, he has limited function but is working to improve that with PT fir his neck and back. He does not have any side effects from his medication. Patient also reports he would like to discuss similar/alternative product to Flector patches at his next visit. These were very effective for him in the past but not covered under his insurance.

## 2018-09-18 DIAGNOSIS — I25.10 ATHEROSCLEROSIS OF NATIVE CORONARY ARTERY WITHOUT ANGINA PECTORIS, UNSPECIFIED WHETHER NATIVE OR TRANSPLANTED HEART: Primary | ICD-10-CM

## 2018-09-18 RX ORDER — POTASSIUM CHLORIDE 750 MG/1
10 TABLET, EXTENDED RELEASE ORAL DAILY
Qty: 30 TAB | Refills: 3 | Status: SHIPPED | OUTPATIENT
Start: 2018-09-18 | End: 2018-12-10 | Stop reason: SDUPTHER

## 2018-09-18 NOTE — TELEPHONE ENCOUNTER
Paper message to add kcl 10 meq a day and BMP in 1 week. Patient was called and made aware and He voices understanding and acceptance of this advice and will call back if any further questions or concerns. Requested Prescriptions     Pending Prescriptions Disp Refills    potassium chloride (KLOR-CON) 10 mEq tablet 30 Tab 3     Sig: Take 1 Tab by mouth daily.

## 2018-09-19 DIAGNOSIS — I10 HTN (HYPERTENSION), BENIGN: ICD-10-CM

## 2018-09-28 ENCOUNTER — TELEPHONE (OUTPATIENT)
Dept: PAIN MANAGEMENT | Age: 58
End: 2018-09-28

## 2018-09-28 NOTE — TELEPHONE ENCOUNTER
Patient called the triage line and stated that he was accepted by [facility]. He wants to know if he should still keep his appt. I will call the patient and inform him that he needs to keep his appt. There was no answer. I left a message for him to keep the appt.

## 2018-10-02 DIAGNOSIS — I25.10 ATHEROSCLEROSIS OF NATIVE CORONARY ARTERY WITHOUT ANGINA PECTORIS, UNSPECIFIED WHETHER NATIVE OR TRANSPLANTED HEART: ICD-10-CM

## 2018-10-23 DIAGNOSIS — I10 HTN (HYPERTENSION), BENIGN: ICD-10-CM

## 2018-10-24 RX ORDER — PRAZOSIN HYDROCHLORIDE 5 MG/1
5 CAPSULE ORAL
Qty: 90 CAP | Refills: 3 | Status: SHIPPED | OUTPATIENT
Start: 2018-10-24 | End: 2018-10-31

## 2018-10-31 PROBLEM — K21.9 GASTRIC REFLUX: Status: ACTIVE | Noted: 2018-10-31

## 2018-10-31 PROBLEM — M67.40 GANGLION CYST: Status: ACTIVE | Noted: 2018-10-31

## 2018-10-31 PROBLEM — K59.00 CONSTIPATION: Status: ACTIVE | Noted: 2018-10-31

## 2018-10-31 PROBLEM — F39 MOOD DISORDER (HCC): Status: ACTIVE | Noted: 2017-09-06

## 2018-10-31 PROBLEM — L85.3 DRY SKIN: Status: ACTIVE | Noted: 2018-10-31

## 2018-10-31 PROBLEM — J30.9 ALLERGIC RHINITIS: Status: ACTIVE | Noted: 2018-10-31

## 2018-10-31 PROBLEM — J32.9 CHRONIC RECURRENT SINUSITIS: Status: ACTIVE | Noted: 2017-12-05

## 2018-10-31 PROBLEM — M25.519 SHOULDER PAIN: Status: ACTIVE | Noted: 2018-10-31

## 2018-10-31 PROBLEM — N52.9 IMPOTENCE: Status: ACTIVE | Noted: 2018-10-31

## 2018-10-31 PROBLEM — M25.539 PAIN IN WRIST: Status: ACTIVE | Noted: 2018-10-31

## 2018-10-31 PROBLEM — F20.9 SCHIZOPHRENIA (HCC): Status: ACTIVE | Noted: 2018-02-23

## 2018-10-31 PROBLEM — S06.0XAA BRAIN CONCUSSION: Status: ACTIVE | Noted: 2018-10-31

## 2018-10-31 PROBLEM — E55.9 VITAMIN D DEFICIENCY: Status: ACTIVE | Noted: 2018-10-31

## 2018-11-02 NOTE — PROGRESS NOTES
Tried to reach patient several at both numbers in the computer  to let him know not to take the ditropan per . Costochondritis, acute

## 2018-11-28 DIAGNOSIS — I10 HTN (HYPERTENSION), BENIGN: ICD-10-CM

## 2018-11-28 RX ORDER — VALSARTAN AND HYDROCHLOROTHIAZIDE 320; 25 MG/1; MG/1
TABLET, FILM COATED ORAL
Qty: 90 TAB | Refills: 3 | OUTPATIENT
Start: 2018-11-28

## 2018-12-07 DIAGNOSIS — I10 HTN (HYPERTENSION), BENIGN: ICD-10-CM

## 2018-12-07 RX ORDER — VALSARTAN AND HYDROCHLOROTHIAZIDE 320; 25 MG/1; MG/1
1 TABLET, FILM COATED ORAL DAILY
Qty: 90 TAB | Refills: 1 | OUTPATIENT
Start: 2018-12-07

## 2018-12-07 NOTE — TELEPHONE ENCOUNTER
Requested Prescriptions     Pending Prescriptions Disp Refills    valsartan-hydroCHLOROthiazide (DIOVAN-HCT) 320-25 mg per tablet 90 Tab 1     Sig: Take 1 Tab by mouth daily.

## 2018-12-10 DIAGNOSIS — I25.10 ATHEROSCLEROSIS OF NATIVE CORONARY ARTERY WITHOUT ANGINA PECTORIS, UNSPECIFIED WHETHER NATIVE OR TRANSPLANTED HEART: ICD-10-CM

## 2018-12-10 RX ORDER — POTASSIUM CHLORIDE 750 MG/1
10 TABLET, EXTENDED RELEASE ORAL DAILY
Qty: 90 TAB | Refills: 3 | Status: SHIPPED | OUTPATIENT
Start: 2018-12-10 | End: 2019-11-06 | Stop reason: SDUPTHER

## 2018-12-10 RX ORDER — VALSARTAN AND HYDROCHLOROTHIAZIDE 320; 25 MG/1; MG/1
1 TABLET, FILM COATED ORAL DAILY
Qty: 90 TAB | Refills: 1 | Status: SHIPPED | OUTPATIENT
Start: 2018-12-10 | End: 2019-01-25 | Stop reason: ALTCHOICE

## 2018-12-18 ENCOUNTER — TELEPHONE (OUTPATIENT)
Dept: CARDIOLOGY CLINIC | Age: 58
End: 2018-12-18

## 2018-12-18 NOTE — LETTER
2018 Dear Cornell Garcia: 
 
Re: Krystal Rivers : 1960 Mr. Vero Martin is cleared from cardiac view with the understanding that patient will have mild risk for this surgery if no significant chest pain or shortness of breath since his last appointment. If you have any questions or any further assistance is needed please contact our office. Sincerely, Ifeoma Ríos M.D. 
 
cc:  Chavez Sanarbia MD

## 2018-12-18 NOTE — TELEPHONE ENCOUNTER
Cleared from cardiac view with the understanding that patient will have mild risk for this surgery if no significant chest pain or shortness of breath since his last appointment.

## 2019-01-08 ENCOUNTER — TELEPHONE (OUTPATIENT)
Dept: CARDIOLOGY CLINIC | Age: 59
End: 2019-01-08

## 2019-01-08 NOTE — TELEPHONE ENCOUNTER
Urology of [address] [facility] would like to know if patient can be bridged off of Xarelto with Lovenox (or an alternative) prior to Urethra reconstruction procedure on the 15 th.

## 2019-01-08 NOTE — TELEPHONE ENCOUNTER
Patients Xarelto is being managed by Dr. Mariia Pickering with [facility], due to R Leg Thrombosis x2. [phone]

## 2019-01-18 ENCOUNTER — HOSPITAL ENCOUNTER (OUTPATIENT)
Dept: PHYSICAL THERAPY | Age: 59
Discharge: HOME | End: 2019-01-18
Payer: MEDICARE

## 2019-01-18 PROCEDURE — 97161 PT EVAL LOW COMPLEX 20 MIN: CPT

## 2019-01-18 PROCEDURE — 97535 SELF CARE MNGMENT TRAINING: CPT

## 2019-01-18 NOTE — PROGRESS NOTES
PHYSICAL THERAPY - DAILY TREATMENT NOTE    Patient Name: Ivy Marie        Date: 2019  : 1960   yes Patient  Verified  Visit #:   1     Insurance: Payor: [place] MED[place]ARE / Plan: [place] MEDICARE PART A & B / Product Type: Medicare /      In time: 1:05 Out time: 2:00   Total Treatment Time: 55     Medicare/BCBS Time Tracking (below)   Total Timed Codes (min):  55 1:1 Treatment Time:  55     TREATMENT AREA =  Low back pain [M54.5]    SUBJECTIVE  Pain Level (on 0 to 10 scale):  9  / 10   Medication Changes/New allergies or changes in medical history, any new surgeries or procedures?    no  If yes, update Summary List   Subjective Functional Status/Changes:  []  No changes reported     See POC          OBJECTIVE    See exam on chart for details on objective findings       8 min Self Care:  Discussed use of back brace PRN as needed for pain control; avoiding over dependence as this will contribute to disuse atrophy. Pt ed to avoid exacerbating movements (I.e. Excessive lumbar flexion with lower body dressing/retrieving items from floor). Pt ed on neutral spine with sit to stand    Rationale:    to improve the patients ability to self manage in home for ADL's      Billed With/As:   [] TE   [] TA   [] Neuro   [x] Self Care Patient Education: [x] Review HEP    [] Progressed/Changed HEP based on:   [] positioning   [] body mechanics   [] transfers   [] heat/ice application    [x] other: Discussed use of back brace PRN as needed for pain control; avoiding over dependence as this will contribute to disuse atrophy.      Other Objective/Functional Measures:    See POC     Post Treatment Pain Level (on 0 to 10) scale:   9   10     ASSESSMENT  Assessment/Changes in Function:     See POC     []  See Progress Note/Recertification   Patient will continue to benefit from skilled PT services to modify and progress therapeutic interventions, address functional mobility deficits, address ROM deficits, address strength deficits, analyze and address soft tissue restrictions, analyze and cue movement patterns, analyze and modify body mechanics/ergonomics and assess and modify postural abnormalities to attain remaining goals. Progress toward goals / Updated goals:    See POC     PLAN  []  Upgrade activities as tolerated yes Continue plan of care   []  Discharge due to :    []  Other:      Therapist: Rigoberto Kyle, PT    Date: 1/18/2019 Time: 9:10 AM     Future Appointments   Date Time Provider Jimmy Eckert   1/22/2019 11:00 AM NAVIN Valera   1/23/2019  3:30 PM [facility]   1/30/2019  3:30 PM Santhosh Reid PTA [facility]   2/4/2019  3:00 PM [facility]   2/6/2019 10:00 AM MD Mac Tomlinson   2/6/2019 10:30 AM Lyn Johnstonestine [facility]ing [facility]   2/6/2019  3:00 PM Santhosh Reid PTA [facility]   5/20/2019 10:30 AM Lula Springer MD [address] [place] [address]
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
## 2019-01-23 ENCOUNTER — HOSPITAL ENCOUNTER (OUTPATIENT)
Dept: PHYSICAL THERAPY | Age: 59
Discharge: HOME | End: 2019-01-23
Payer: MEDICARE

## 2019-01-23 PROCEDURE — 97113 AQUATIC THERAPY/EXERCISES: CPT

## 2019-01-23 NOTE — PROGRESS NOTES
PHYSICAL THERAPY - DAILY TREATMENT NOTE - AQUATIC THERAPY    Patient Name: Andrea Guerra        Date: 2019  : 1960   YES Patient  Verified  Visit #:     Insurance: Payor: [address] [place] [address] / Plan: [place] PART A & B / Product Type: Medicare /      In time: 330 Out time: 400   Total Treatment Time: 30     Medicare Time Tracking (below)   Total Timed Codes (min):  30 1:1 Treatment Time:  30     TREATMENT AREA =  Low back pain [M54.5]    SUBJECTIVE    Pain Level (on 0 to 10 scale):  8-9  / 10   Medication Changes/New allergies or changes in medical history, any new surgeries or procedures? NO If yes, update Summary List   Subjective Functional Status/Changes:  []  No changes reported     I been through a whole lot of surgeries and I been bed ridden al last year so I knows I need me some exercising. And I been in a lot of pain to where my eyes water. OBJECTIVE      30 min Aquatic Therapy:  [x]  See flow sheet   Rationale:      increase ROM, increase strength, improve coordination and improve balance to improve the patients ability to perform functional mobility activities with decrease c/o symptoms. min Patient Education:  YES  Reviewed HEP   []  Progressed/Changed HEP based on: Other Objective/Functional Measures:    No change in functional measurements today. Patient received HEP for pool exercises which has been reviewed. Patient advised to have HEP laminated to bring to pool when he joins a rec center.      Post Treatment Pain Level (on 0 to 10) scale:   7-8  / 10     ASSESSMENT  Assessment/Changes in Function:     Overall good tolerance to all therx in pool for first treatment session     []  See Progress Note/Recertification   Patient will continue to benefit from skilled PT services to modify and progress therapeutic interventions, address functional mobility deficits, address ROM deficits, address strength deficits and analyze and cue movement patterns to attain remaining goals. Progress toward goals / Updated goals: · Short Term Goals: To be accomplished in  2  weeks:  1. Pt will be educated in appropriate HEP to decrease pain, increase ROM, increase strength and return pt to PLOF.  Progressing  2. Pt will note pain less than or equal to 6/10 at worst to allow increase in functional abilities. 3. Patient will report at least 25% functional improvement for lower body dressing. No change toward 2 and 3 goals today.      PLAN  []  Upgrade activities as tolerated YES Continue plan of care   []  Discharge due to :    []  Other:      Therapist: Troy Cordoba PTA    Date: 1/23/2019 Time: 7:00 AM       Future Appointments   Date Time Provider Jimmy Eckert   1/23/2019  3:30 PM Gabriel Maldonado [facility]   1/30/2019  3:30 PM Александр Torres PTA [facility]   2/4/2019  3:00 PM Gabriel Maldonado [facility]   2/6/2019  3:00 PM Gabriel Maldonado [facility]   5/20/2019 10:30 AM Alyson Hamman, MD [address] [place] [address]

## 2019-01-25 ENCOUNTER — TELEPHONE (OUTPATIENT)
Dept: CARDIOLOGY CLINIC | Age: 59
End: 2019-01-25

## 2019-01-25 DIAGNOSIS — I10 ESSENTIAL HYPERTENSION: Primary | ICD-10-CM

## 2019-01-25 RX ORDER — HYDROCHLOROTHIAZIDE 25 MG/1
25 TABLET ORAL DAILY
Qty: 30 TAB | Refills: 6 | Status: SHIPPED | OUTPATIENT
Start: 2019-01-25 | End: 2019-02-25 | Stop reason: SDUPTHER

## 2019-01-25 RX ORDER — LOSARTAN POTASSIUM 100 MG/1
100 TABLET ORAL DAILY
Qty: 30 TAB | Refills: 6 | Status: SHIPPED | OUTPATIENT
Start: 2019-01-25 | End: 2019-05-20 | Stop reason: SDUPTHER

## 2019-01-25 NOTE — TELEPHONE ENCOUNTER
Rx. Losartan 100mg and HCTZ 25 mg. To monitor and log home b/p and send to office in 1 week after medication changes.

## 2019-02-06 ENCOUNTER — APPOINTMENT (OUTPATIENT)
Dept: PHYSICAL THERAPY | Age: 59
End: 2019-02-06
Payer: MEDICARE

## 2019-02-15 NOTE — PROGRESS NOTES
[address] [place] [address], [address] [place] [address] - Phone: [phone]  Fax: [phone]  CONTINUED PLAN OF CARE/RECERTIFICATION FOR PHYSICAL THERAPY          Patient Name: Mumtaz Martinez : 1960   Medical/Treatment Diagnosis: Low back pain [M54.5]   Onset Date: chronic    Referral Source: Carrie Garcia [facility]): 2019   Prior Hospitalization: See Medical History Provider #: [id number]   Prior Level of Function: Chronic limitations to standing, walking, stairs and sitting   Comorbidities: Arthritis, HTN, concurrent neck pain, h/o L rotator cuff tear, h/o L carpal tunnel release, DM, BMI >30   Medications: Verified on Patient Summary List   Visits from [facility]: 2 Missed Visits: 1     Goal/Measure of Progress Goal Met? 1. Pt will improve FOTO score to >/= 42 to demo a significant improvement in functional activity tolerance. Status at last Eval: 31 Current Status: 39 progressing   2. Pt will achieve >/= +3 GROC in order to promote increased activity tolerance. Status at last Eval:  Current Status: +2 a little better progressing   3. Pt will be independent with long term HEP for self management of condition. Status at last Eval:  Current Status:  no     Key Functional Changes/Progress: Patient was last seen on 19 and has not had treatment for ~ 2 weeks. Patient reported that he had a loss in the family. Patient has had little progression toward goals. · Short Term Goals:  1. Pt will be educated in appropriate HEP to decrease pain, increase ROM, increase strength and return pt to PLOF.  Received HEP on 19 for aquatic exercises  2. Pt will note pain less than or equal to 6/10 at worst to allow increase in functional abilities. No change  3. Patient will report at least 25% functional improvement for lower body dressing.  No change      Problem List: pain affecting function, decrease ROM, decrease strength, impaired gait/ balance, decrease ADL/ functional abilitiies, decrease activity tolerance, decrease flexibility/ joint mobility and decrease transfer abilities   Treatment Plan may include any combination of the following: Therapeutic exercise, Therapeutic activities, Neuromuscular re-education, Physical agent/modality, Gait/balance training, Manual therapy, Aquatic therapy, Patient education, Functional mobility training and Stair training  Patient Goal(s) has been updated and includes:  \"to get better\"    Goals for this certification period include and are to be achieved in   4  weeks:  Continue with above goals. Frequency / Duration:   Patient to be seen   2   times per week for   4    weeks:    Assessments/Recommendations: We would like to continue with aquatic therapy to progress patient further toward goals. Pt has been ed on need for improved compliance with POC to ensure progress and promote goal attainment; he verbalizes understanding and agreement. Please advise. Thank you. Specifics: continue 2x/wk for 4 weeks. If you have any questions/comments please contact us directly at [phone]. Thank you for allowing us to assist in the care of your patient. Therapist Signature: JOSE Small/  EAGLE Erazo Date: 9/43/90   Certification Period:  Reporting Period: 01/18/19 to 4/16/19 1/18/19 - 1/30/19 Time: 6:23 am   NOTE TO PHYSICIAN:  PLEASE COMPLETE THE ORDERS BELOW AND FAX TO   [facility]: [phone]  If you are unable to process this request in 24 hours please contact our office: [phone]    ___ I have read the above report and request that my patient continue as recommended.   ___ I have read the above report and request that my patient continue therapy with the following changes/special instructions: ________________________________________________   ___ I have read the above report and request that my patient be discharged from therapy. Physician Signature:        Date:       Time: today

## 2019-02-19 ENCOUNTER — HOSPITAL ENCOUNTER (OUTPATIENT)
Dept: PHYSICAL THERAPY | Age: 59
Discharge: HOME | End: 2019-02-19
Payer: MEDICARE

## 2019-02-19 PROCEDURE — 97113 AQUATIC THERAPY/EXERCISES: CPT

## 2019-02-19 NOTE — PROGRESS NOTES
PHYSICAL THERAPY - DAILY TREATMENT NOTE - AQUATIC THERAPY    Patient Name: Felipe Alejandro        Date: 2019  : 1960   YES Patient  Verified  Visit #:      of   12  Insurance: Payor: Mirna Venita / Plan: [place] MEDICARE PART A & B / Product Type: Medicare /      In time: 200 Out time: 230   Total Treatment Time: 30     Medicare Time Tracking (below)   Total Timed Codes (min):  30 1:1 Treatment Time:  30     TREATMENT AREA =  Low back pain [M54.5]    SUBJECTIVE    Pain Level (on 0 to 10 scale):  9-10  / 10   Medication Changes/New allergies or changes in medical history, any new surgeries or procedures? NO If yes, update Summary List   Subjective Functional Status/Changes:  []  No changes reported     Been struggling the last few days with there pain. Been trying to do the exercises. Patient has not been to the pool to start exercising. OBJECTIVE      30 min Aquatic Therapy:  [x]  See flow sheet   Rationale:      increase ROM, increase strength, improve coordination and improve balance to improve the patients ability to perform functional mobility activities with decrease c/o symptoms.       min Patient Education:  YES  Reviewed HEP   []  Progressed/Changed HEP based on: Other Objective/Functional Measures:    GROC +2 a little better  FOTO 39. No change in therx as patient has not been in treatment for ~ 2 weeks. Post Treatment Pain Level (on 0 to 10) scale:   6-7  / 10     ASSESSMENT  Assessment/Changes in Function:       No significant change noted in function, continues to report increase of pain with all functional mobility activities. []  See Progress Note/Recertification   Patient will continue to benefit from skilled PT services to modify and progress therapeutic interventions, address functional mobility deficits, address ROM deficits, address strength deficits and analyze and cue movement patterns to attain remaining goals.    Progress toward goals / Updated goals:    See recert.      PLAN  []  Upgrade activities as tolerated YES Continue plan of care   []  Discharge due to :    []  Other:      Therapist: Stacy Nolasco PTA    Date: 2/19/2019 Time: 6:16 AM       Future Appointments   Date Time Provider Jimmy Eckert   2/19/2019  2:00 PM [facility]   2/25/2019  3:30 PM [facility]   2/27/2019  3:30 PM Luke Jenkins PTA [facility]   5/20/2019 10:30 AM Osmany Armas MD [address] [place] [address]

## 2019-02-25 ENCOUNTER — HOSPITAL ENCOUNTER (OUTPATIENT)
Dept: PHYSICAL THERAPY | Age: 59
End: 2019-02-25
Payer: MEDICARE

## 2019-02-25 DIAGNOSIS — I10 ESSENTIAL HYPERTENSION: ICD-10-CM

## 2019-02-25 RX ORDER — HYDROCHLOROTHIAZIDE 25 MG/1
25 TABLET ORAL DAILY
Qty: 90 TAB | Refills: 3 | Status: SHIPPED | OUTPATIENT
Start: 2019-02-25 | End: 2021-03-11 | Stop reason: SDUPTHER

## 2019-02-25 NOTE — TELEPHONE ENCOUNTER
Fax received from the [facility] pharmacy requesting 90 day refill of Hydrodiuril 25 mg tablets.

## 2019-02-27 ENCOUNTER — APPOINTMENT (OUTPATIENT)
Dept: PHYSICAL THERAPY | Age: 59
End: 2019-02-27
Payer: MEDICARE

## 2019-02-27 NOTE — PROGRESS NOTES
[address] [place] [address], [address] [place] [address] - Phone: [phone]  Fax: [phone]  DISCHARGE FOR PHYSICAL THERAPY          Patient Name: Donna Rosario : 1960   Medical/Treatment Diagnosis: Low back pain [M54.5]   Onset Date: chronic    Referral Source: [facility]): 2019   Prior Hospitalization: See Medical History Provider #: [id number]   Prior Level of Function: Chronic limitations to standing, walking, stairs and sitting   Comorbidities: Arthritis, HTN, concurrent neck pain, h/o L rotator cuff tear, h/o L carpal tunnel release, DM, BMI >30   Medications: Verified on Patient Summary List   Visits from [facility]: 4 Missed Visits: 1     Goal/Measure of Progress Goal Met? 1. Pt will improve FOTO score to >/= 42 to demo a significant improvement in functional activity tolerance. Status at last Eval: 31 Current Status: 39 progressing   2. Pt will achieve >/= +3 GROC in order to promote increased activity tolerance. Status at last Eval:  Current Status: +2 a little better progressing   3. Pt will be independent with long term HEP for self management of condition. Status at last Eval:  Current Status:  no     Key Functional Changes/Progress: Patient was last seen on 19 and had one session upon returning on 19. Patient has had little progression toward goals. · Short Term Goals:  1. Pt will be educated in appropriate HEP to decrease pain, increase ROM, increase strength and return pt to PLOF.  Received HEP on 19 for aquatic exercises  2. Pt will note pain less than or equal to 6/10 at worst to allow increase in functional abilities. No change: 8-9/10 pain levels  3. Patient will report at least 25% functional improvement for lower body dressing.  No change    Assessments/Recommendations: Patient called to self discharge stating that he was going to have surgery  If you have any questions/comments please contact us directly at [phone]. Thank you for allowing us to assist in the care of your patient. Therapist Signature: JOSE Damian/   Berkley Goltz, MSPT Date: 2/27/19   Reporting Period: 1/18/19 - 1/30/19 Time: 4:07 pm   NOTE TO PHYSICIAN:  PLEASE COMPLETE THE ORDERS BELOW AND FAX TO   [facility]: [phone]  If you are unable to process this request in 24 hours please contact our office: [phone]    ___ I have read the above report and request that my patient continue as recommended.   ___ I have read the above report and request that my patient continue therapy with the following changes/special instructions: ________________________________________________   ___ I have read the above report and request that my patient be discharged from therapy.      Physician Signature:        Date:       Time:

## 2019-03-04 ENCOUNTER — APPOINTMENT (OUTPATIENT)
Dept: PHYSICAL THERAPY | Age: 59
End: 2019-03-04

## 2019-03-06 ENCOUNTER — APPOINTMENT (OUTPATIENT)
Dept: PHYSICAL THERAPY | Age: 59
End: 2019-03-06

## 2019-05-20 ENCOUNTER — OFFICE VISIT (OUTPATIENT)
Dept: CARDIOLOGY CLINIC | Age: 59
End: 2019-05-20

## 2019-05-20 VITALS
WEIGHT: 286.8 LBS | HEART RATE: 78 BPM | HEIGHT: 69 IN | DIASTOLIC BLOOD PRESSURE: 74 MMHG | SYSTOLIC BLOOD PRESSURE: 134 MMHG | BODY MASS INDEX: 42.48 KG/M2

## 2019-05-20 DIAGNOSIS — I50.32 CHRONIC DIASTOLIC CONGESTIVE HEART FAILURE (HCC): Primary | ICD-10-CM

## 2019-05-20 DIAGNOSIS — F17.200 TOBACCO DEPENDENCE: ICD-10-CM

## 2019-05-20 DIAGNOSIS — E66.01 SEVERE OBESITY (BMI >= 40) (HCC): ICD-10-CM

## 2019-05-20 DIAGNOSIS — I10 ESSENTIAL HYPERTENSION: ICD-10-CM

## 2019-05-20 DIAGNOSIS — E78.2 MIXED HYPERLIPIDEMIA: ICD-10-CM

## 2019-05-20 RX ORDER — LOSARTAN POTASSIUM 100 MG/1
100 TABLET ORAL DAILY
Qty: 30 TAB | Refills: 6 | Status: SHIPPED | OUTPATIENT
Start: 2019-05-20 | End: 2019-11-15 | Stop reason: SDUPTHER

## 2019-05-20 RX ORDER — PRAZOSIN HYDROCHLORIDE 5 MG/1
5 CAPSULE ORAL
COMMUNITY
End: 2019-10-12 | Stop reason: SDUPTHER

## 2019-05-20 NOTE — PROGRESS NOTES
HISTORY OF PRESENT ILLNESS  Anselm Ganser is a 60 y.o. male. Hypertension   The history is provided by the patient and medical records. This is a chronic problem. Associated symptoms include shortness of breath. Pertinent negatives include no chest pain and no headaches. Cholesterol Problem   The history is provided by the medical records. This is a chronic problem. Associated symptoms include shortness of breath. Pertinent negatives include no chest pain and no headaches. Dizziness   The history is provided by the patient. This is a recurrent problem. The current episode started more than 1 week ago. The problem occurs rarely. The problem has not changed since onset. Associated symptoms include shortness of breath. Pertinent negatives include no chest pain and no headaches. The symptoms are aggravated by standing. Shortness of Breath   The history is provided by the patient. This is a new problem. The problem occurs intermittently. The current episode started more than 1 week ago. Pertinent negatives include no fever, no headaches, no cough, no wheezing, no PND, no orthopnea, no chest pain, no vomiting, no rash, no leg swelling and no claudication. The problem's precipitants include exercise (2 flights). Review of Systems   Constitutional: Negative for chills, diaphoresis, fever, malaise/fatigue and weight loss. HENT: Negative for nosebleeds. Eyes: Negative for discharge. Respiratory: Positive for shortness of breath. Negative for cough and wheezing. Cardiovascular: Negative for chest pain, palpitations, orthopnea, claudication, leg swelling and PND. Gastrointestinal: Negative for diarrhea, nausea and vomiting. Genitourinary: Negative for dysuria and hematuria. Musculoskeletal: Negative for joint pain. Skin: Negative for rash. Neurological: Positive for dizziness. Negative for seizures, loss of consciousness and headaches. Endo/Heme/Allergies: Negative for polydipsia.  Does not bruise/bleed easily. Psychiatric/Behavioral: Negative for depression and substance abuse. The patient does not have insomnia. Allergies   Allergen Reactions    Other Food Hives     \"double-crabs\"  Does fine with all other seafood    Seafood Hives     \"deviled-crabs\"  Does fine with all other seafood    Aspirin Hives     Other reaction(s): facial swelling     Simvastatin Other (comments)     Pt states that he is not allergic    Tomato Hives       Past Medical History:   Diagnosis Date    Adverse effect of anesthesia     sx cx due to blood pressure drop    Allergic rhinitis     Asthma     Atherosclerosis of native coronary artery with angina pectoris ([address] 5/14/2015    atypical angina, mild stress abnormality may be artifact nl EF by echo     Benign non-nodular prostatic hyperplasia with lower urinary tract symptoms     BPH with obstruction/lower urinary tract symptoms     Bulbous urethral stricture     Chronic epididymitis     Chronic pain     shoulder, neck, knee    Coronary artery disease involving native coronary artery of native heart with angina pectoris ([address] 5/13/2016    chr atypical CP for few seconds only     Diabetes mellitus ([address]     DM (diabetes mellitus) ([address]     DVT (deep venous thrombosis) ([address] 2013    left leg    Emphysema     Epididymal cyst     Essential hypertension     GERD (gastroesophageal reflux disease)     High cholesterol     Hypertension     Impotence     Incontinence     Intermittent urinary stream     Kidney stone     Lower urinary tract symptoms (LUTS)     Microhematuria     Morbid obesity ([address] 4/1/2015    Multiple trauma     LEFT SHOULDER, LEFT WRIST, NECK, AND BACK    Other and unspecified hyperlipidemia 4/1/2015    Paranoid schizophrenia ([address]     Preop cardiovascular exam 11/13/2015    wrist surgery; no significant CP; abnl stress ? artifact cleared with mild risk periop     Sciatica     Severe obesity (BMI >= 40) ([address] 11/13/2015    Sinusitis     Spermatocele of epididymis     Type II or unspecified type diabetes mellitus without mention of complication, not stated as uncontrolled 4/1/2015    Urethral stricture     Urinary urgency     Urine leukocytes     Weak urinary stream        Family History   Problem Relation Age of Onset    Stroke Mother 79    Heart Disease Mother     Diabetes Father     Cancer Brother     Heart Attack Neg Hx        Social History     Tobacco Use    Smoking status: Current Some Day Smoker     Packs/day: 0.25     Years: 32.00     Pack years: 8.00     Types: Cigarettes     Start date: 8/1/1976    Smokeless tobacco: Former User    Tobacco comment: per patient stated 1 or 2 a day ready to stop   Substance Use Topics    Alcohol use: No     Alcohol/week: 0.0 oz    Drug use: No        Current Outpatient Medications   Medication Sig    prazosin (MINIPRESS) 5 mg capsule Take 5 mg by mouth nightly.  trospium (SANCTURA) 20 mg tablet TAKE 1 TABLET BY MOUTH TWICE A DAY    hydroCHLOROthiazide (HYDRODIURIL) 25 mg tablet Take 1 Tab by mouth daily.  losartan (COZAAR) 100 mg tablet Take 1 Tab by mouth daily.  potassium chloride (KLOR-CON) 10 mEq tablet Take 1 Tab by mouth daily.  cromolyn (OPTICROM) 4 % ophthalmic solution cromolyn 4 % eye drops   INSTILL 1 DROP INTO AFFECTED EYE(S) BY OPHTHALMIC ROUTE 4 TIMES PER DAY    sildenafil, antihypertensive, (REVATIO) 20 mg tablet Take 3-5 tablets one hour prior to sexual activity as needed.     alcohol swabs (ALCOHOL PREP PADS) padm Alcohol Prep Pads   use as directed prior to insulin injection 1-2 times daily    Insulin Needles, Disposable, (BD ULTRA-FINE SHORT PEN NEEDLE) 31 gauge x 5/16\" ndle BD Ultra-Fine Short Pen Needle 31 gauge x 5/16\"   USE TO TEST TWICE DAILY    pen needle, diabetic (COMFORT EZ PEN NEEDLES) 32 gauge x 3/16\" ndle Comfort EZ Pen Needles 32 gauge x 3/16\"    oxyCODONE-acetaminophen (PERCOCET 10)  mg per tablet oxycodone-acetaminophen 7.5 mg-500 mg tablet    glucose blood VI test strips (ONETOUCH ULTRA BLUE TEST STRIP) strip OneTouch Ultra Blue Test Strip   TO CHECK BLOOD SUGAR TWICE A DAY    beclomethasone dipropionate (QNASL) 80 mcg/actuation HFAA QNASL 80 mcg/actuation nasal aerosol spray   SPRAY 2 SPRAYS NASALLY TWO (2) TIMES A DAY.  benztropine (COGENTIN) 0.5 mg tablet benztropine 0.5 mg tablet    citalopram (CELEXA) 40 mg tablet citalopram 40 mg tablet    diclofenac (VOLTAREN) 1 % gel Voltaren 1 % topical gel    fexofenadine (ALLEGRA) 180 mg tablet TAKE 1 TABLET BY MOUTH EVERY DAY IN THE MORNING    insulin lispro  & lisp protamine, human, (HUMALOG MIX 75-25 KWIKPEN) 100 unit/mL (75-25) inpn Humalog Mix 75-25 KwikPen U-100 insulin 100 unit/mL subcutaneous pen    mometasone (NASONEX) 50 mcg/actuation nasal spray Nasonex 50 mcg/actuation Spray    omega-3 acid ethyl esters (LOVAZA) 1 gram capsule omega-3 acid ethyl esters 1 gram capsule   Take 2 capsules by mouth twice daily    neomycin-colist-hydrocortisone-thonzonium (CORTISPORIN-TC) otic suspension 4 mL.  tamsulosin (FLOMAX) 0.4 mg capsule Take 1 Cap by mouth daily (after dinner).  Back Brace misc 1 Each by Does Not Apply route daily. Please order  back brace for help with patient stability and balance  ( [facility] medical supply )    amantadine HCl (SYMMETREL) 100 mg capsule TAKE 1 CAPSULE BY MOUTH TWICE DAILY FOR STIFFNESS OR TREMOR    ONETOUCH ULTRA TEST strip USED TO CHECK BLOOD SUGAR TWICE A DAY- DX: E11.9    dilTIAZem CD (CARDIZEM CD) 240 mg ER capsule TAKE 1 CAP BY MOUTH DAILY. INDICATIONS: HYPERTENSION    LINZESS 145 mcg cap capsule TAKE 1 CAPSULE(S) EVERY DAY BY ORAL ROUTE.  nystatin (MYCOSTATIN) topical cream APPLY TO GROIN 2 TIMES DAILY AS NEEDED.  rivaroxaban (XARELTO) 15 mg tab tablet Take 10 mg by mouth daily (with breakfast).  amLODIPine (NORVASC) 10 mg tablet Take 1 Tab by mouth daily.     pravastatin (PRAVACHOL) 40 mg tablet Take 40 mg by mouth nightly.  gabapentin (NEURONTIN) 300 mg capsule Take 1 Cap by mouth three (3) times daily. For nerve pain. 1 cap qhs for 1 wk, then increase to 1 cap BID for 1 wk, then increase to 1 cap TID    montelukast (SINGULAIR) 10 mg tablet TAKE 1 TABLET BY MOUTH EVERY DAY    trifluoperazine (STELAZINE) 10 mg tablet 10 mg nightly. Indications: SCHIZOPHRENIA    NOVOLOG MIX 70-30 FLEXPEN 100 unit/mL (70-30) inpn Indications: pt adjusts daily    albuterol (PROVENTIL HFA, VENTOLIN HFA, PROAIR HFA) 90 mcg/actuation inhaler 2 Puffs every four (4) hours as needed.  ammonium lactate (LAC-HYDRIN) 12 % lotion Apply  to affected area as needed (feet).  Azelastine (ASTEPRO) 0.15 % (205.5 mcg) nasal spray 1 [place] by Both Nostrils route.  escitalopram oxalate (LEXAPRO) 20 mg tablet Take 20 mg by mouth daily.  polyethylene glycol (MIRALAX) 17 gram/dose powder 17 g as needed.  ziprasidone (GEODON) 80 mg capsule daily as needed.  fluticasone (FLONASE) 50 mcg/actuation nasal spray Two squirts both nostrils at bedtime    cyclobenzaprine (FLEXERIL) 5 mg tablet Take 5 mg by mouth three (3) times daily as needed for Muscle Spasm(s). Takes 1 to 2 tablets    topiramate (TOPAMAX) 200 mg tablet Take  by mouth two (2) times a day. Indications: MIGRAINE PREVENTION     No current facility-administered medications for this visit.          Past Surgical History:   Procedure Laterality Date    HX CARPAL TUNNEL RELEASE Left     HX HEENT  2017    sinus surgery    HX OTHER SURGICAL  2008    sinus    HX OTHER SURGICAL  2009    stress test    HX OTHER SURGICAL  03/31/2016    EPIDURAL INJECTIONS    HX SHOULDER ARTHROSCOPY Left     [facility]  [facility]  09/12/2017    [address], Cysto, direct visualized internal urethrotomy, Dr. Blayne Posadas       Diagnostic Studies:  CARDIOLOGY STUDIES 4/8/2015 4/3/2015   Exercise Nuclear Stress Test Result - sev HTN response, 39%EF, antbasal/apical ischemia? artifact from arm   Echocardiogram - Complete Result 55%EF, mild DD/dil LA -   Some recent data might be hidden   7/17 Nuc Stress  IMPRESSION:     1. No evidence of ischemia. 2.  Ejection fraction calculated at 33%    7/17 ECHO  1. Normal LV size and systolic function. EF ~ 55%  2. Grade I diastolic dysfunction. 3. No significant valvular pathology. Visit Vitals  Ht 5' 9\" (1.753 m)   Wt 130.1 kg (286 lb 12.8 oz)   BMI 42.35 kg/m²       Mr. Marylou Fung has a reminder for a \"due or due soon\" health maintenance. I have asked that he contact his primary care provider for follow-up on this health maintenance. Physical Exam   Constitutional: He is oriented to person, place, and time. He appears well-developed and well-nourished. No distress. HENT:   Head: Normocephalic and atraumatic. Mouth/Throat: Normal dentition. Eyes: Right eye exhibits no discharge. Left eye exhibits no discharge. No scleral icterus. Neck: Neck supple. No JVD present. Carotid bruit is not present. No thyromegaly present. Cardiovascular: Normal rate, regular rhythm, S1 normal, S2 normal, normal heart sounds and intact distal pulses. Exam reveals no gallop and no friction rub. No murmur heard. Pulmonary/Chest: Effort normal and breath sounds normal. He has no wheezes. He has no rales. Abdominal: Soft. He exhibits no mass. There is no tenderness. Musculoskeletal: He exhibits edema (trace). Lymphadenopathy:        Right cervical: No superficial cervical adenopathy present. Left cervical: No superficial cervical adenopathy present. Neurological: He is alert and oriented to person, place, and time. Skin: Skin is warm and dry. No rash noted. Psychiatric: He has a normal mood and affect. His behavior is normal.       ASSESSMENT and PLAN    Discussed the patient's above normal BMI with him.   I have recommended the following interventions: dietary management education, guidance, and counseling . The BMI follow up plan is as follows: BMI is out of normal parameters and plan is as follows: I have counseled this patient on diet and exercise regimens    Patient advised to stop smoking to reduce future cardiovascular events. Has chronic diastolic CHF. Would avoid diuretics due to history of dizziness. Blood pressure seems to be controlled. Diagnoses and all orders for this visit:    1. Chronic diastolic congestive heart failure ([address]    2. Essential hypertension  -     losartan (COZAAR) 100 mg tablet; Take 1 Tab by mouth daily. 3. Mixed hyperlipidemia    4. Tobacco dependence    5. Severe obesity (BMI >= 40) (HCC)        Pertinent laboratory and test data reviewed and discussed with patient. See patient instructions also for other medical advice given    Medications Discontinued During This Encounter   Medication Reason    trospium (SANCTURA) 20 mg tablet Duplicate Order    docusate sodium (COLACE) 100 mg capsule     fluticasone-salmeterol (ADVAIR) 250-50 mcg/dose diskus inhaler     traZODone (DESYREL) 150 mg tablet     losartan (COZAAR) 100 mg tablet Reorder       Follow-up and Dispositions    · Return in about 1 year (around 5/20/2020), or if symptoms worsen or fail to improve, for with ekg.

## 2019-05-20 NOTE — PATIENT INSTRUCTIONS
Medications Discontinued During This Encounter   Medication Reason    trospium (SANCTURA) 20 mg tablet Duplicate Order    docusate sodium (COLACE) 100 mg capsule     fluticasone-salmeterol (ADVAIR) 250-50 mcg/dose diskus inhaler     traZODone (DESYREL) 150 mg tablet     losartan (COZAAR) 100 mg tablet Reorder          Learning About Benefits From Quitting Smoking  How does quitting smoking make you healthier? If you're thinking about quitting smoking, you may have a few reasons to be smoke-free. Your health may be one of them. · When you quit smoking, you lower your risks for cancer, lung disease, heart attack, stroke, blood vessel disease, and blindness from macular degeneration. · When you're smoke-free, you get sick less often, and you heal faster. You are less likely to get colds, flu, bronchitis, and pneumonia. · As a nonsmoker, you may find that your mood is better and you are less stressed. When and how will you feel healthier? Quitting has real health benefits that start from day 1 of being smoke-free. And the longer you stay smoke-free, the healthier you get and the better you feel. The first hours  · After just 20 minutes, your blood pressure and heart rate go down. That means there's less stress on your heart and blood vessels. · Within 12 hours, the level of carbon monoxide in your blood drops back to normal. That makes room for more oxygen. With more oxygen in your body, you may notice that you have more energy than when you smoked. After 2 weeks  · Your lungs start to work better. · Your risk of heart attack starts to drop. After 1 month  · When your lungs are clear, you cough less and breathe deeper, so it's easier to be active. · Your sense of taste and smell return. That means you can enjoy food more than you have since you started smoking. Over the years  · After 1 year, your risk of heart disease is half what it would be if you kept smoking.   · After 5 years, your risk of stroke starts to shrink. Within a few years after that, it's about the same as if you'd never smoked. · After 10 years, your risk of dying from lung cancer is cut by about half. And your risk for many other types of cancer is lower too. How would quitting help others in your life? When you quit smoking, you improve the health of everyone who now breathes in your smoke. · Their heart, lung, and cancer risks drop, much like yours. · They are sick less. For babies and small children, living smoke-free means they're less likely to have ear infections, pneumonia, and bronchitis. · If you're a woman who is or will be pregnant someday, quitting smoking means a healthier . · Children who are close to you are less likely to become adult smokers. Where can you learn more? Go to http://bailey-cristian.info/. Enter [id number] [id number] [id number] 11 in the search box to learn more about \"Learning About Benefits From Quitting Smoking. \"  Current as of: 2018  Content Version: 11.9  © 6086-9289 [employer]. Care instructions adapted under license by [email or web address] (which disclaims liability or warranty for this information). If you have questions about a medical condition or this instruction, always ask your healthcare professional. [address] any warranty or liability for your use of this information. Body Mass Index: Care Instructions  Your Care Instructions    Body mass index (BMI) can help you see if your weight is raising your risk for health problems. It uses a formula to compare how much you weigh with how tall you are. · A BMI lower than 18.5 is considered underweight. · A BMI between 18.5 and 24.9 is considered healthy. · A BMI between 25 and 29.9 is considered overweight. A BMI of 30 or higher is considered obese. If your BMI is in the normal range, it means that you have a lower risk for weight-related health problems.  If your BMI is in the overweight or obese range, you may be at increased risk for weight-related health problems, such as high blood pressure, heart disease, stroke, arthritis or joint pain, and diabetes. If your BMI is in the underweight range, you may be at increased risk for health problems such as fatigue, lower protection (immunity) against illness, muscle loss, bone loss, hair loss, and hormone problems. BMI is just one measure of your risk for weight-related health problems. You may be at higher risk for health problems if you are not active, you eat an unhealthy diet, or you drink too much alcohol or use tobacco products. Follow-up care is a key part of your treatment and safety. Be sure to make and go to all appointments, and call your doctor if you are having problems. It's also a good idea to know your test results and keep a list of the medicines you take. How can you care for yourself at home? · Practice healthy eating habits. This includes eating plenty of fruits, vegetables, whole grains, lean protein, and low-fat dairy. · If your doctor recommends it, get more exercise. Walking is a good choice. Bit by bit, increase the amount you walk every day. Try for at least 30 minutes on most days of the week. · Do not smoke. Smoking can increase your risk for health problems. If you need help quitting, talk to your doctor about stop-smoking programs and medicines. These can increase your chances of quitting for good. · Limit alcohol to 2 drinks a day for men and 1 drink a day for women. Too much alcohol can cause health problems. If you have a BMI higher than 25  · Your doctor may do other tests to check your risk for weight-related health problems. This may include measuring the distance around your waist. A waist measurement of more than 40 inches in men or 35 inches in women can increase the risk of weight-related health problems. · Talk with your doctor about steps you can take to stay healthy or improve your health. You may need to make lifestyle changes to lose weight and stay healthy, such as changing your diet and getting regular exercise. If you have a BMI lower than 18.5  · Your doctor may do other tests to check your risk for health problems. · Talk with your doctor about steps you can take to stay healthy or improve your health. You may need to make lifestyle changes to gain or maintain weight and stay healthy, such as getting more healthy foods in your diet and doing exercises to build muscle. Where can you learn more? Go to http://bailey-cristian.info/. Enter S176 in the search box to learn more about \"Body Mass Index: Care Instructions. \"  Current as of: June 25, 2018  Content Version: 11.9  © 0434-8694 [employer]. Care instructions adapted under license by [employer] (which disclaims liability or warranty for this information). If you have questions about a medical condition or this instruction, always ask your healthcare professional. [address] any warranty or liability for your use of this information.

## 2019-05-20 NOTE — PROGRESS NOTES
1. Have you been to the ER, urgent care clinic since your last visit? Hospitalized since your last visit?     no  2. Have you seen or consulted any other health care providers outside of the [address] [place] [address] since your last visit? Include any pap smears or colon screening. No     3. Since your last visit, have you had any of the following symptoms?      swelling in legs/arms. Explain:after walking for a long time     4. Have you had any blood work, X-rays or cardiac testing? Yes When: 01/19 Reason for visit: routine      Requested: YES     In [facility]: NO    5. Where do you [facility] have your labs drawn? [facility] or PCP     6. Do you need any refills today? yes    Requested Prescriptions     Pending Prescriptions Disp Refills    losartan (COZAAR) 100 mg tablet 30 Tab 6     Sig: Take 1 Tab by mouth daily.

## 2019-07-17 DIAGNOSIS — I10 HTN (HYPERTENSION), BENIGN: ICD-10-CM

## 2019-07-17 RX ORDER — AMLODIPINE BESYLATE 10 MG/1
10 TABLET ORAL DAILY
Qty: 90 TAB | Refills: 2 | Status: SHIPPED | OUTPATIENT
Start: 2019-07-17 | End: 2021-03-11 | Stop reason: SDUPTHER

## 2019-10-14 RX ORDER — PRAZOSIN HYDROCHLORIDE 5 MG/1
CAPSULE ORAL
Qty: 90 CAP | Refills: 3 | Status: SHIPPED | OUTPATIENT
Start: 2019-10-14 | End: 2020-07-08 | Stop reason: ALTCHOICE

## 2019-11-06 DIAGNOSIS — I25.10 ATHEROSCLEROSIS OF NATIVE CORONARY ARTERY WITHOUT ANGINA PECTORIS, UNSPECIFIED WHETHER NATIVE OR TRANSPLANTED HEART: ICD-10-CM

## 2019-11-06 RX ORDER — POTASSIUM CHLORIDE 750 MG/1
TABLET, EXTENDED RELEASE ORAL
Qty: 90 TAB | Refills: 3 | Status: SHIPPED | OUTPATIENT
Start: 2019-11-06 | End: 2020-01-14

## 2019-11-15 DIAGNOSIS — I10 ESSENTIAL HYPERTENSION: ICD-10-CM

## 2019-11-15 RX ORDER — LOSARTAN POTASSIUM 100 MG/1
TABLET ORAL
Qty: 90 TAB | Refills: 2 | Status: SHIPPED | OUTPATIENT
Start: 2019-11-15 | End: 2020-01-14

## 2020-02-10 ENCOUNTER — OFFICE VISIT (OUTPATIENT)
Dept: CARDIOLOGY CLINIC | Age: 60
End: 2020-02-10

## 2020-02-10 VITALS
BODY MASS INDEX: 40.88 KG/M2 | WEIGHT: 276 LBS | DIASTOLIC BLOOD PRESSURE: 66 MMHG | HEART RATE: 68 BPM | TEMPERATURE: 97.8 F | OXYGEN SATURATION: 98 % | HEIGHT: 69 IN | SYSTOLIC BLOOD PRESSURE: 134 MMHG

## 2020-02-10 DIAGNOSIS — E78.2 MIXED HYPERLIPIDEMIA: ICD-10-CM

## 2020-02-10 DIAGNOSIS — I50.32 CHRONIC DIASTOLIC CONGESTIVE HEART FAILURE (HCC): ICD-10-CM

## 2020-02-10 DIAGNOSIS — I10 ESSENTIAL HYPERTENSION: Primary | ICD-10-CM

## 2020-02-10 DIAGNOSIS — Z01.818 PRE-OP EVALUATION: ICD-10-CM

## 2020-02-10 DIAGNOSIS — I25.10 ATHEROSCLEROSIS OF NATIVE CORONARY ARTERY WITHOUT ANGINA PECTORIS, UNSPECIFIED WHETHER NATIVE OR TRANSPLANTED HEART: ICD-10-CM

## 2020-02-10 DIAGNOSIS — F17.200 TOBACCO DEPENDENCE: ICD-10-CM

## 2020-02-10 RX ORDER — POTASSIUM CHLORIDE 750 MG/1
TABLET, FILM COATED, EXTENDED RELEASE ORAL
COMMUNITY
End: 2021-03-11 | Stop reason: SDUPTHER

## 2020-02-10 RX ORDER — LOSARTAN POTASSIUM 100 MG/1
100 TABLET ORAL DAILY
COMMUNITY
End: 2021-03-11 | Stop reason: SDUPTHER

## 2020-02-10 NOTE — PATIENT INSTRUCTIONS

## 2020-02-10 NOTE — PROGRESS NOTES
HISTORY OF PRESENT ILLNESS  Aleida Fleming is a 60 y.o. male. He is anticipating prostate surgery 2/18/2019 with Dr. Liberty Shin    Hypertension   The history is provided by the patient and medical records. This is a chronic problem. Pertinent negatives include no chest pain, no headaches and no shortness of breath. Cholesterol Problem   The history is provided by the medical records. This is a chronic problem. Pertinent negatives include no chest pain, no headaches and no shortness of breath. Dizziness   The history is provided by the patient. This is a recurrent problem. The current episode started more than 1 week ago. The problem occurs rarely. The problem has not changed since onset. Pertinent negatives include no chest pain, no headaches and no shortness of breath. The symptoms are aggravated by standing. Shortness of Breath   The history is provided by the patient. This is a new problem. The problem occurs intermittently. The current episode started more than 1 week ago. Associated symptoms include neck pain. Pertinent negatives include no fever, no headaches, no cough, no wheezing, no PND, no orthopnea, no chest pain, no vomiting, no rash, no leg swelling and no claudication. The problem's precipitants include exercise (2 flights). CHF   Pertinent negatives include no chest pain, no headaches and no shortness of breath. Review of Systems   Constitutional: Negative for chills, diaphoresis, fever, malaise/fatigue and weight loss. HENT: Negative for nosebleeds. Eyes: Negative for discharge. Respiratory: Negative for cough, shortness of breath and wheezing. Cardiovascular: Negative for chest pain, palpitations, orthopnea, claudication, leg swelling and PND. Gastrointestinal: Negative for diarrhea, nausea and vomiting. Genitourinary: Negative for dysuria and hematuria. Musculoskeletal: Positive for back pain, joint pain and neck pain. Skin: Negative for rash.    Neurological: Positive for dizziness. Negative for seizures, loss of consciousness and headaches. Endo/Heme/Allergies: Negative for polydipsia. Does not bruise/bleed easily. Psychiatric/Behavioral: Negative for depression and substance abuse. The patient does not have insomnia.       Allergies   Allergen Reactions    Other Food Hives     \"double-crabs\"  Does fine with all other seafood    Seafood Hives     \"deviled-crabs\"  Does fine with all other seafood    Aspirin Hives     Other reaction(s): facial swelling     Simvastatin Other (comments)     Pt states that he is not allergic    Tomato Hives       Past Medical History:   Diagnosis Date    Adverse effect of anesthesia     sx cx due to blood pressure drop    Allergic rhinitis     Asthma     Atherosclerosis of native coronary artery with angina pectoris ([address] 5/14/2015    atypical angina, mild stress abnormality may be artifact nl EF by echo     Benign non-nodular prostatic hyperplasia with lower urinary tract symptoms     BPH with obstruction/lower urinary tract symptoms     Bulbous urethral stricture     Chronic epididymitis     Chronic pain     shoulder, neck, knee    Coronary artery disease involving native coronary artery of native heart with angina pectoris ([address] 5/13/2016    chr atypical CP for few seconds only     Diabetes mellitus ([address]     DM (diabetes mellitus) ([address]     DVT (deep venous thrombosis) ([address] 2013    left leg    Emphysema     Epididymal cyst     Essential hypertension     GERD (gastroesophageal reflux disease)     High cholesterol     Hypertension     Impotence     Incontinence     Intermittent urinary stream     Kidney stone     Lower urinary tract symptoms (LUTS)     Microhematuria     Morbid obesity ([address] 4/1/2015    Multiple trauma     LEFT SHOULDER, LEFT WRIST, NECK, AND BACK    Other and unspecified hyperlipidemia 4/1/2015    Paranoid schizophrenia ([address]     Preop cardiovascular exam 11/13/2015    wrist surgery; no significant CP; abnl stress ? artifact cleared with mild risk periop     Sciatica     Severe obesity (BMI >= 40) ([facility]) 11/13/2015    Sinusitis     Spermatocele of epididymis     Type II or unspecified type diabetes mellitus without mention of complication, not stated as uncontrolled 4/1/2015    Urethral stricture     Urinary urgency     Urine leukocytes     Weak urinary stream        Family History   Problem Relation Age of Onset    Stroke Mother 79    Heart Disease Mother     Diabetes Father     Cancer Brother     Heart Attack Neg Hx        Social History     Tobacco Use    Smoking status: Current Some Day Smoker     Packs/day: 0.25     Years: 32.00     Pack years: 8.00     Types: Cigarettes     Start date: 8/1/1976    Smokeless tobacco: Former User    Tobacco comment: per patient stated 1 or 2 a day ready to stop   Substance Use Topics    Alcohol use: No     Alcohol/week: 0.0 standard drinks    Drug use: No        Current Outpatient Medications   Medication Sig    losartan (COZAAR) 100 mg tablet Take 100 mg by mouth daily.  potassium chloride SR (KLOR-CON 10) 10 mEq tablet Take  by mouth.  om 3/E/linol/ala/oleic/gla/lip (OMEGA 3-6-9 PO) Take  by mouth.  alfuzosin SR (UROXATRAL) 10 mg SR tablet as needed.  insulin detemir U-100 (LEVEMIR FLEXTOUCH U-100 INSULN) 100 unit/mL (3 mL) inpn Levemir Flexpen 100 unit/mL (3 mL) solution subcutaneous insulin pen    menthol (BIOFREEZE, MENTHOL,) 4 % gel As needed    olopatadine (PAZEO) 0.7 % drop Pazeo 0.7 % eye drops   INSTILL 1 DROP ONCE DAILY AS NEEDED FOR ITCHING OR WATERINESS    thiothixene (NAVANE) 10 mg capsule thiothixene 10 mg capsule    tadalafil (CIALIS) 5 mg tablet Take 1 Tab by mouth daily. For ED.   Indications: the inability to have an erection    diclofenac (FLECTOR) 1.3 % pt12 Flector 1.3 % transdermal 12 hour patch   APPLY ONE PATCH EVERY 12 HOURS AS NEEDED FOR PAIN    ergocalciferol (ERGOCALCIFEROL) 50,000 unit capsule Take 1 capsule every week by oral route.  lurasidone (LATUDA) 20 mg tab tablet as needed.  naloxone (NARCAN) 4 mg/actuation nasal spray Narcan 4 mg/actuation nasal spray    terbinafine HCl (LAMISIL) 1 % topical cream APPLY TO THE AFFECTED AND SURROUNDING AREAS OF SKIN BY TOPICAL ROUTE ONCE DAILY    traZODone (DESYREL) 150 mg tablet Take 50 mg by mouth nightly.  tamsulosin (FLOMAX) 0.4 mg capsule TAKE 1 CAP BY MOUTH DAILY AFTER DINNER    prazosin (MINIPRESS) 5 mg capsule TAKE 1 CAP BY MOUTH NIGHTLY.  amLODIPine (NORVASC) 10 mg tablet Take 1 Tab by mouth daily.  trospium (SANCTURA) 20 mg tablet TAKE 1 TABLET BY MOUTH TWICE A DAY    hydroCHLOROthiazide (HYDRODIURIL) 25 mg tablet Take 1 Tab by mouth daily.  cromolyn (OPTICROM) 4 % ophthalmic solution cromolyn 4 % eye drops   INSTILL 1 DROP INTO AFFECTED EYE(S) BY OPHTHALMIC ROUTE 4 TIMES PER DAY    alcohol swabs (ALCOHOL PREP PADS) padm Alcohol Prep Pads   use as directed prior to insulin injection 1-2 times daily    Insulin Needles, Disposable, (BD ULTRA-FINE SHORT PEN NEEDLE) 31 gauge x 5/16\" ndle BD Ultra-Fine Short Pen Needle 31 gauge x 5/16\"   USE TO TEST TWICE DAILY    pen needle, diabetic (COMFORT EZ PEN NEEDLES) 32 gauge x 3/16\" ndle Comfort EZ Pen Needles 32 gauge x 3/16\"    oxyCODONE-acetaminophen (PERCOCET 10)  mg per tablet oxycodone-acetaminophen 7.5 mg-500 mg tablet    glucose blood VI test strips (ONETOUCH ULTRA BLUE TEST STRIP) strip OneTouch Ultra Blue Test Strip   TO CHECK BLOOD SUGAR TWICE A DAY    benztropine (COGENTIN) 0.5 mg tablet benztropine 0.5 mg tablet    citalopram (CELEXA) 40 mg tablet citalopram 40 mg tablet    diclofenac (VOLTAREN) 1 % gel Voltaren 1 % topical gel    Back Brace misc 1 Each by Does Not Apply route daily. Please order  back brace for help with patient stability and balance  ( [employer] supply )    amantadine HCl (SYMMETREL) 100 mg capsule as needed.     ONETOUCH ULTRA TEST strip USED TO CHECK BLOOD SUGAR TWICE A DAY- DX: E11.9    LINZESS 145 mcg cap capsule TAKE 1 CAPSULE(S) EVERY DAY BY ORAL ROUTE.  nystatin (MYCOSTATIN) topical cream APPLY TO GROIN 2 TIMES DAILY AS NEEDED.  rivaroxaban (XARELTO) 15 mg tab tablet Take 10 mg by mouth daily (with breakfast).  pravastatin (PRAVACHOL) 40 mg tablet Take 40 mg by mouth nightly.  gabapentin (NEURONTIN) 300 mg capsule Take 1 Cap by mouth three (3) times daily. For nerve pain. 1 cap qhs for 1 wk, then increase to 1 cap BID for 1 wk, then increase to 1 cap TID    trifluoperazine (STELAZINE) 10 mg tablet Take 5 mg by mouth nightly. Indications: schizophrenia    albuterol (PROVENTIL HFA, VENTOLIN HFA, PROAIR HFA) 90 mcg/actuation inhaler 2 Puffs every four (4) hours as needed.  ammonium lactate (LAC-HYDRIN) 12 % lotion Apply  to affected area as needed (feet).  Azelastine (ASTEPRO) 0.15 % (205.5 mcg) nasal spray 1 [place] by Both Nostrils route.  polyethylene glycol (MIRALAX) 17 gram/dose powder 17 g as needed.  ziprasidone (GEODON) 80 mg capsule daily as needed.  fluticasone (FLONASE) 50 mcg/actuation nasal spray Two squirts both nostrils at bedtime    cyclobenzaprine (FLEXERIL) 5 mg tablet Take 5 mg by mouth three (3) times daily as needed for Muscle Spasm(s). Takes 1 to 2 tablets    topiramate (TOPAMAX) 200 mg tablet Take 200 mg by mouth nightly. 2 tabs at night prn  Indications: migraine prevention     No current facility-administered medications for this visit.          Past Surgical History:   Procedure Laterality Date    HX CARPAL TUNNEL RELEASE Left     HX HEENT  2017    sinus surgery    HX OTHER SURGICAL  2008    sinus    HX OTHER SURGICAL  2009    stress test    HX OTHER SURGICAL  03/31/2016    EPIDURAL INJECTIONS    HX SHOULDER ARTHROSCOPY Left     HX TURP  05/20/2016    Dr. Yani Haynes Smoker HX TURP  07/22/2016    RAYA Haynes, Dr. Lomeli Smoker HX UROLOGICAL  09/12/2017    [address], Cysto, direct visualized internal urethrotomy, Dr. Felix Saenz       Diagnostic Studies:  CARDIOLOGY STUDIES 4/8/2015 4/3/2015   Exercise Nuclear Stress Test Result - sev HTN response, 39%EF, antbasal/apical ischemia? artifact from arm   Echocardiogram - Complete Result 55%EF, mild DD/dil LA -   Some recent data might be hidden   7/17 Nuc Stress  IMPRESSION:     1. No evidence of ischemia. 2.  Ejection fraction calculated at 33%    7/17 ECHO  1. Normal LV size and systolic function. EF ~ 55%  2. Grade I diastolic dysfunction. 3. No significant valvular pathology. Visit Vitals  /66 (BP 1 Location: Left arm, BP Patient Position: Sitting)   Pulse 68   Temp 97.8 °F (36.6 °C) (Oral)   Ht 5' 9\" (1.753 m)   Wt 125.2 kg (276 lb)   SpO2 98%   BMI 40.76 kg/m²       Mr. Flaco Burgos has a reminder for a \"due or due soon\" health maintenance. I have asked that he contact his primary care provider for follow-up on this health maintenance. Physical Exam   Constitutional: He is oriented to person, place, and time. He appears well-developed and well-nourished. No distress. HENT:   Head: Normocephalic and atraumatic. Mouth/Throat: Normal dentition. Eyes: Right eye exhibits no discharge. Left eye exhibits no discharge. No scleral icterus. Neck: Neck supple. No JVD present. Carotid bruit is not present. No thyromegaly present. Cardiovascular: Normal rate, regular rhythm, S1 normal, S2 normal, normal heart sounds and intact distal pulses. Exam reveals no gallop and no friction rub. No murmur heard. Pulmonary/Chest: Effort normal and breath sounds normal. He has no wheezes. He has no rales. Abdominal: Soft. He exhibits no mass. There is no abdominal tenderness. Musculoskeletal:         General: Edema (trace) present. Lymphadenopathy:        Right cervical: No superficial cervical adenopathy present. Left cervical: No superficial cervical adenopathy present. Neurological: He is alert and oriented to person, place, and time.    Skin: Skin is warm and dry. No rash noted. Psychiatric: He has a normal mood and affect. His behavior is normal.       ASSESSMENT and PLAN  HLD 11/2019  Component Name Value Ref Range   Cholesterol 159 110 - 200 mg/dL   Triglyceride 121 40 - 149 mg/dL   HDL 43 40 - 59 mg/dL   Cholesterol/HDL 3.7 0.0 - 5.0    LDL CALCULATION 92 50 - 99 mg/dL   VLDL CALCULATION 24        Discussed the patient's above normal BMI with him. I have recommended the following interventions: dietary management education, guidance, and counseling . The BMI follow up plan is as follows: BMI is out of normal parameters and plan is as follows: I have counseled this patient on diet and exercise regimens    Patient advised to stop smoking to reduce future cardiovascular events. Has chronic diastolic CHF. Would avoid diuretics due to history of dizziness. Blood pressure seems to be controlled. 2/2020  Compensated diastolic CHF  Blood pressure controlled with current medications. May proceed with surgery with low cardiac risk. Encouraged smoking cessation to reduce the risk of future cardiovascular events. Diagnoses and all orders for this visit:    1. Essential hypertension  -     AMB POC EKG ROUTINE W/ 12 LEADS, INTER & REP    2. Atherosclerosis of native coronary artery without angina pectoris, unspecified whether native or transplanted heart    3. Chronic diastolic congestive heart failure ([address]    4. Mixed hyperlipidemia  -     LIPID PANEL; Future  -     HEPATIC FUNCTION PANEL; Future    5. Tobacco dependence    6. Pre-op evaluation        Pertinent laboratory and test data reviewed and discussed with patient.   See patient instructions also for other medical advice given    Medications Discontinued During This Encounter   Medication Reason    cefixime (SUPRAX) 400 mg capsule     dilTIAZem CD (CARDIZEM CD) 240 mg ER capsule     escitalopram oxalate (LEXAPRO) 20 mg tablet     fluticasone propion-salmeterol (ADVAIR/WIXELA) 250-50 mcg/dose diskus inhaler     fondaparinux (ARIXTRA) 10 mg/0.8 mL syrg     HYDROcodone-acetaminophen (NORCO) 5-325 mg per tablet     hydrOXYzine HCl (ATARAX) 25 mg tablet     insulin lispro  & lisp protamine, human, (HUMALOG MIX 75-25 KWIKPEN) 100 unit/mL (75-25) inpn     insulin NPH/insulin regular (NOVOLIN 70/30 U-100 INSULIN) 100 unit/mL (70-30) injection     levocetirizine (XYZAL) 5 mg tablet     lisinopril (PRINIVIL, ZESTRIL) 40 mg tablet     losartan-hydroCHLOROthiazide (HYZAAR) 100-12.5 mg per tablet     NOVOLOG MIX 70-30 FLEXPEN 100 unit/mL (70-30) inpn     oxybutynin (DITROPAN) 5 mg tablet     valsartan-hydroCHLOROthiazide (DIOVAN-HCT) 320-25 mg per tablet     simvastatin (ZOCOR) 20 mg tablet Alternate Therapy       Follow-up and Dispositions    · Return in about 6 months (around 8/10/2020), or if symptoms worsen or fail to improve, for post surgery. I have independently evaluated and examined the patient. Seen preoperatively for prostate surgery. History of hypertension chronic diastolic CHF with hyperlipidemia as well as continued tobacco use. May have CAD as stress test was abnormal in 2015 but completely normal in 2017. Clinically stable without any acute CHF or acute ischemia. Check routine lipids which have not been done for quite some time. Will clear with mild risk as his stress test was normal 2 years ago. All relevant labs and testing data's are reviewed. Care plan discussed and updated after review.   America Carrera MD

## 2020-02-10 NOTE — PROGRESS NOTES
1. Have you been to the ER, urgent care clinic since your last visit? Hospitalized since your last visit? Yes When: 1/1&3/2020 Where: [facility] [place] Reason for visit: Abscess    2. Have you seen or consulted any other health care providers outside of the [address] [place] [address] since your last visit? Include any pap smears or colon screening. Yes Where: Urology [facility] Reason for visit: Surgery     3. Since your last visit, have you had any of the following symptoms?      dizziness. 4.  Have you had any blood work, X-rays or cardiac testing? Yes Where: [facility] Reason for visit: Labs     Requested: NO     [facility]    5. Where do you normally have your labs drawn? [facility]    6. Do you need any refills today?    Yes

## 2020-03-11 PROBLEM — Z01.818 PRE-OP EVALUATION: Status: RESOLVED | Noted: 2020-02-10 | Resolved: 2020-03-11

## 2020-04-28 NOTE — TELEPHONE ENCOUNTER
Marisol Alejo has called requesting a refill of their controlled medication, oxycodone, for the management of chronic low back pain. Last office visit date: 6/13/18 with Alonso Form, next appt 9/13/18 with Stone Sanchez    Date last  was pulled and reviewed : 8/29/18 last filled 8/01/18    Was the patient compliant when the above report was pulled? yes    Analgesia: 50/60%    Aberrancies: none    ADL's: yes, somethings    Adverse Reaction: none    Provider's last note and plan of care reviewed? yes  Request forwarded to provider for review.
Never smoker

## 2020-07-08 PROBLEM — Z79.01 CHRONIC ANTICOAGULATION: Status: ACTIVE | Noted: 2020-06-25

## 2020-07-08 PROBLEM — N17.9 AKI (ACUTE KIDNEY INJURY) (HCC): Status: ACTIVE | Noted: 2020-06-23

## 2020-07-08 PROBLEM — Z86.718 HISTORY OF RECURRENT DEEP VEIN THROMBOSIS: Status: ACTIVE | Noted: 2020-05-08

## 2020-07-08 PROBLEM — N20.1 LEFT URETERAL STONE: Status: ACTIVE | Noted: 2020-06-23

## 2020-07-08 PROBLEM — I82.409 RECURRENT DEEP VEIN THROMBOSIS (HCC): Status: ACTIVE | Noted: 2020-05-08

## 2020-08-17 RX ORDER — PRAZOSIN HYDROCHLORIDE 5 MG/1
CAPSULE ORAL
Qty: 90 CAP | Refills: 3 | Status: SHIPPED | OUTPATIENT
Start: 2020-08-17 | End: 2022-03-10 | Stop reason: SDUPTHER

## 2020-09-10 ENCOUNTER — OFFICE VISIT (OUTPATIENT)
Dept: CARDIOLOGY CLINIC | Age: 60
End: 2020-09-10

## 2020-09-10 VITALS
DIASTOLIC BLOOD PRESSURE: 66 MMHG | TEMPERATURE: 97.8 F | WEIGHT: 278 LBS | HEIGHT: 69 IN | OXYGEN SATURATION: 99 % | HEART RATE: 75 BPM | SYSTOLIC BLOOD PRESSURE: 135 MMHG | BODY MASS INDEX: 41.18 KG/M2

## 2020-09-10 DIAGNOSIS — E66.01 SEVERE OBESITY (BMI >= 40) (HCC): ICD-10-CM

## 2020-09-10 DIAGNOSIS — I50.32 CHRONIC DIASTOLIC CONGESTIVE HEART FAILURE (HCC): ICD-10-CM

## 2020-09-10 DIAGNOSIS — F17.200 TOBACCO DEPENDENCE: ICD-10-CM

## 2020-09-10 DIAGNOSIS — E78.2 MIXED HYPERLIPIDEMIA: ICD-10-CM

## 2020-09-10 DIAGNOSIS — I10 HTN (HYPERTENSION), BENIGN: ICD-10-CM

## 2020-09-10 DIAGNOSIS — I25.10 ATHEROSCLEROSIS OF NATIVE CORONARY ARTERY OF NATIVE HEART WITHOUT ANGINA PECTORIS: Primary | ICD-10-CM

## 2020-09-10 NOTE — PATIENT INSTRUCTIONS
Medications Discontinued During This Encounter   Medication Reason    alfuzosin SR (UROXATRAL) 10 mg SR tablet     nystatin (MYCOSTATIN) topical cream     sildenafil citrate (VIAGRA) 100 mg tablet     terbinafine HCl (LAMISIL) 1 % topical cream     thiothixene (NAVANE) 10 mg capsule     traZODone (DESYREL) 150 mg tablet           Learning About Benefits From Quitting Smoking  How does quitting smoking make you healthier? If you're thinking about quitting smoking, you may have a few reasons to be smoke-free. Your health may be one of them. · When you quit smoking, you lower your risks for cancer, lung disease, heart attack, stroke, blood vessel disease, and blindness from macular degeneration. · When you're smoke-free, you get sick less often, and you heal faster. You are less likely to get colds, flu, bronchitis, and pneumonia. · As a nonsmoker, you may find that your mood is better and you are less stressed. When and how will you feel healthier? Quitting has real health benefits that start from day 1 of being smoke-free. And the longer you stay smoke-free, the healthier you get and the better you feel. The first hours  · After just 20 minutes, your blood pressure and heart rate go down. That means there's less stress on your heart and blood vessels. · Within 12 hours, the level of carbon monoxide in your blood drops back to normal. That makes room for more oxygen. With more oxygen in your body, you may notice that you have more energy than when you smoked. After 2 weeks  · Your lungs start to work better. · Your risk of heart attack starts to drop. After 1 month  · When your lungs are clear, you cough less and breathe deeper, so it's easier to be active. · Your sense of taste and smell return. That means you can enjoy food more than you have since you started smoking. Over the years  · Over the years, your risks of heart disease, heart attack, and stroke are lower.   · After 10 years, your risk of dying from lung cancer is cut by about half. And your risk for many other types of cancer is lower too. How would quitting help others in your life? When you quit smoking, you improve the health of everyone who now breathes in your smoke. · Their heart, lung, and cancer risks drop, much like yours. · They are sick less. For babies and small children, living smoke-free means they're less likely to have ear infections, pneumonia, and bronchitis. · If you're a woman who is or will be pregnant someday, quitting smoking means a healthier . · Children who are close to you are less likely to become adult smokers. Where can you learn more? Go to http://bailey-cristian.info/  Enter O319 in the search box to learn more about \"Learning About Benefits From Quitting Smoking. \"  Current as of: 2020               Content Version: 12.6  © 6184-7060 [employer]. Care instructions adapted under license by [employer] (which disclaims liability or warranty for this information). If you have questions about a medical condition or this instruction, always ask your healthcare professional. [address] any warranty or liability for your use of this information.

## 2020-09-10 NOTE — PROGRESS NOTES
HISTORY OF PRESENT ILLNESS  Funmi Morales is a 63 y.o. male. Follow-up of CAD, hypertension, chronic diastolic CHF, hyperlipidemia, morbid obesity and tobacco abuse    CHF   The history is provided by the medical records. This is a chronic problem. Associated symptoms include shortness of breath. Pertinent negatives include no chest pain and no headaches. Hypertension   The history is provided by the patient and medical records. This is a chronic problem. Associated symptoms include shortness of breath. Pertinent negatives include no chest pain and no headaches. Cholesterol Problem   The history is provided by the medical records. This is a chronic problem. Associated symptoms include shortness of breath. Pertinent negatives include no chest pain and no headaches. Dizziness   The history is provided by the patient. This is a recurrent problem. The current episode started more than 1 week ago. The problem occurs rarely. The problem has been resolved. Associated symptoms include shortness of breath. Pertinent negatives include no chest pain and no headaches. The symptoms are aggravated by standing. Shortness of Breath   The history is provided by the patient. This is a new problem. The problem occurs intermittently. The current episode started more than 1 week ago. Associated symptoms include neck pain. Pertinent negatives include no fever, no headaches, no cough, no wheezing, no PND, no orthopnea, no chest pain, no vomiting, no rash, no leg swelling and no claudication. The problem's precipitants include exercise (2 flights; 9/20 walking 1 hr;). Review of Systems   Constitutional: Negative for chills, diaphoresis, fever, malaise/fatigue and weight loss. HENT: Negative for nosebleeds. Eyes: Negative for discharge. Respiratory: Positive for shortness of breath. Negative for cough and wheezing. Cardiovascular: Negative for chest pain, palpitations, orthopnea, claudication, leg swelling and PND. Gastrointestinal: Negative for diarrhea, nausea and vomiting. Genitourinary: Negative for dysuria and hematuria. Musculoskeletal: Positive for back pain, joint pain and neck pain. Skin: Negative for rash. Neurological: Negative for dizziness, seizures, loss of consciousness and headaches. Endo/Heme/Allergies: Negative for polydipsia. Does not bruise/bleed easily. Psychiatric/Behavioral: Negative for depression and substance abuse. The patient does not have insomnia.       Allergies   Allergen Reactions    Other Food Hives     \"double-crabs\"  Does fine with all other seafood    Seafood Hives     \"deviled-crabs\"  Does fine with all other seafood    Aspirin Hives     Other reaction(s): facial swelling     Simvastatin Other (comments)     Pt states that he is not allergic    Tomato Hives       Past Medical History:   Diagnosis Date    Adverse effect of anesthesia     sx cx due to blood pressure drop    Allergic rhinitis     Asthma     Atherosclerosis of native coronary artery with angina pectoris ([address] 5/14/2015    atypical angina, mild stress abnormality may be artifact nl EF by echo     Benign non-nodular prostatic hyperplasia with lower urinary tract symptoms     BPH with obstruction/lower urinary tract symptoms     Bulbous urethral stricture     Chronic epididymitis     Chronic pain     shoulder, neck, knee    Coronary artery disease involving native coronary artery of native heart with angina pectoris ([address] 5/13/2016    chr atypical CP for few seconds only     Diabetes mellitus ([address]     DM (diabetes mellitus) ([address]     DVT (deep venous thrombosis) ([address] 2013    left leg    Emphysema     Epididymal cyst     Erectile dysfunction of organic origin     Essential hypertension     GERD (gastroesophageal reflux disease)     High cholesterol     Hypertension     Impotence     Incomplete bladder emptying     Incontinence     Intermittent urinary stream     Kidney stone     Lower urinary tract symptoms (LUTS)     Microhematuria     Morbid obesity ([facility] 4/1/2015    Multiple trauma     LEFT SHOULDER, LEFT WRIST, NECK, AND BACK    Other and unspecified hyperlipidemia 4/1/2015    Paranoid schizophrenia ([facility]     Pre-op testing     Preop cardiovascular exam 11/13/2015    wrist surgery; no significant CP; abnl stress ? artifact cleared with mild risk periop     Sciatica     Severe obesity (BMI >= 40) [facility] 11/13/2015    Sinusitis     Spermatocele of epididymis     Type II or unspecified type diabetes mellitus without mention of complication, not stated as uncontrolled 4/1/2015    Urethral stricture     Urinary urgency     Urine leukocytes     Weak urinary stream        Family History   Problem Relation Age of Onset    Stroke Mother 79    Heart Disease Mother     Diabetes Father     Cancer Brother     Heart Attack Neg Hx        Social History     Tobacco Use    Smoking status: Current Some Day Smoker     Packs/day: 0.25     Years: 32.00     Pack years: 8.00     Types: Cigarettes     Start date: 8/1/1976    Smokeless tobacco: Former User    Tobacco comment: per patient stated 1 or 2 a day ready to stop   Substance Use Topics    Alcohol use: No     Alcohol/week: 0.0 standard drinks    Drug use: No        Current Outpatient Medications   Medication Sig    finasteride (PROSCAR) 5 mg tablet Take 1 Tab by mouth daily.  tamsulosin (FLOMAX) 0.4 mg capsule TAKE 2 CAPSULES BY MOUTH QHS    tadalafiL (CIALIS) 5 mg tablet Take 1 Tab by mouth daily. For ED. Indications: the inability to have an erection    prazosin (MINIPRESS) 5 mg capsule TAKE 1 CAP BY MOUTH NIGHTLY.  levocetirizine (XYZAL) 5 mg tablet TAKE 1 TABLET BY MOUTH EVERY DAY    montelukast (SINGULAIR) 10 mg tablet as needed.     Levemir FlexTouch U-100 Insuln 100 unit/mL (3 mL) inpn INJECT UP TO 90 UNITS SUBCUTANEOUSLY DAILY    NovoLOG Mix 70-30FlexPen U-100 100 unit/mL (70-30) inpn     escitalopram oxalate (LEXAPRO) 20 mg tablet as needed.  furosemide (LASIX) 20 mg tablet furosemide 20 mg tablet   Take 1 tablet every day by oral route.  losartan (COZAAR) 100 mg tablet Take 100 mg by mouth daily.  potassium chloride SR (KLOR-CON 10) 10 mEq tablet Take  by mouth.  om 3/E/linol/ala/oleic/gla/lip (OMEGA 3-6-9 PO) Take  by mouth.  olopatadine (PAZEO) 0.7 % drop Pazeo 0.7 % eye drops   INSTILL 1 DROP ONCE DAILY AS NEEDED FOR ITCHING OR WATERINESS    diclofenac (FLECTOR) 1.3 % pt12 Flector 1.3 % transdermal 12 hour patch   APPLY ONE PATCH EVERY 12 HOURS AS NEEDED FOR PAIN    ergocalciferol (ERGOCALCIFEROL) 50,000 unit capsule Take 1 capsule every week by oral route.  lurasidone (LATUDA) 20 mg tab tablet as needed.  naloxone (NARCAN) 4 mg/actuation nasal spray Narcan 4 mg/actuation nasal spray    amLODIPine (NORVASC) 10 mg tablet Take 1 Tab by mouth daily.  hydroCHLOROthiazide (HYDRODIURIL) 25 mg tablet Take 1 Tab by mouth daily.     cromolyn (OPTICROM) 4 % ophthalmic solution cromolyn 4 % eye drops   INSTILL 1 DROP INTO AFFECTED EYE(S) BY OPHTHALMIC ROUTE 4 TIMES PER DAY    alcohol swabs (ALCOHOL PREP PADS) padm Alcohol Prep Pads   use as directed prior to insulin injection 1-2 times daily    Insulin Needles, Disposable, (BD ULTRA-FINE SHORT PEN NEEDLE) 31 gauge x 5/16\" ndle BD Ultra-Fine Short Pen Needle 31 gauge x 5/16\"   USE TO TEST TWICE DAILY    pen needle, diabetic (COMFORT EZ PEN NEEDLES) 32 gauge x 3/16\" ndle Comfort EZ Pen Needles 32 gauge x 3/16\"    oxyCODONE-acetaminophen (PERCOCET 10)  mg per tablet oxycodone-acetaminophen 7.5 mg-500 mg tablet    glucose blood VI test strips (ONETOUCH ULTRA BLUE TEST STRIP) strip OneTouch Ultra Blue Test Strip   TO CHECK BLOOD SUGAR TWICE A DAY    benztropine (COGENTIN) 0.5 mg tablet benztropine 0.5 mg tablet    citalopram (CELEXA) 40 mg tablet citalopram 40 mg tablet    diclofenac (VOLTAREN) 1 % gel Voltaren 1 % topical gel    Back Brace misc 1 Each by Does Not Apply route daily. Please order  back brace for help with patient stability and balance  ( [employer] )    amantadine HCl (SYMMETREL) 100 mg capsule as needed.  ONETOUCH ULTRA TEST strip USED TO CHECK BLOOD SUGAR TWICE A DAY- DX: E11.9    LINZESS 145 mcg cap capsule as needed.  rivaroxaban (XARELTO) 15 mg tab tablet Take 10 mg by mouth daily (with breakfast).  pravastatin (PRAVACHOL) 40 mg tablet Take 40 mg by mouth nightly.  gabapentin (NEURONTIN) 300 mg capsule Take 1 Cap by mouth three (3) times daily. For nerve pain. 1 cap qhs for 1 wk, then increase to 1 cap BID for 1 wk, then increase to 1 cap TID    albuterol (PROVENTIL HFA, VENTOLIN HFA, PROAIR HFA) 90 mcg/actuation inhaler 2 Puffs every four (4) hours as needed.  ammonium lactate (LAC-HYDRIN) 12 % lotion Apply  to affected area as needed (feet).  Azelastine (ASTEPRO) 0.15 % (205.5 mcg) nasal spray 1 [place] by Both Nostrils route.  polyethylene glycol (MIRALAX) 17 gram/dose powder 17 g as needed.  fluticasone (FLONASE) 50 mcg/actuation nasal spray Two squirts both nostrils at bedtime    cyclobenzaprine (FLEXERIL) 5 mg tablet Take 5 mg by mouth three (3) times daily as needed for Muscle Spasm(s). Takes 1 to 2 tablets    topiramate (TOPAMAX) 200 mg tablet Take 200 mg by mouth nightly. 2 tabs at night prn  Indications: migraine prevention     No current facility-administered medications for this visit.          Past Surgical History:   Procedure Laterality Date    HX CARPAL TUNNEL RELEASE Left     HX HEENT  2017    sinus surgery    HX OTHER SURGICAL  2008    sinus    HX OTHER SURGICAL  2009    stress test    HX OTHER SURGICAL  03/31/2016    EPIDURAL INJECTIONS    HX SHOULDER ARTHROSCOPY Left     [facility]  [facility]  [facility]    [facility], Cysto, direct visualized internal urethrotomy, Dr. Mayo Neat UROLOGICAL  02/18/2020    Cystourethroscopy/Urethroplasty - [facility] - Dr. Fransico Wiley       Diagnostic Studies:  CARDIOLOGY STUDIES 4/8/2015 4/3/2015   Exercise Nuclear Stress Test Result - sev HTN response, 39%EF, antbasal/apical ischemia? artifact from arm   Echocardiogram - Complete Result 55%EF, mild DD/dil LA -   Some recent data might be hidden   7/17 Nuc Stress  IMPRESSION:     1. No evidence of ischemia. 2.  Ejection fraction calculated at 33%    7/17 ECHO  1. Normal LV size and systolic function. EF ~ 55%  2. Grade I diastolic dysfunction. 3. No significant valvular pathology. Visit Vitals  /66 (BP 1 Location: Left arm, BP Patient Position: Sitting)   Pulse 75   Temp 97.8 °F (36.6 °C) (Temporal)   Ht 5' 9\" (1.753 m)   Wt 126.1 kg (278 lb)   SpO2 99%   BMI 41.05 kg/m²       Mr. Javad Thomas has a reminder for a \"due or due soon\" health maintenance. I have asked that he contact his primary care provider for follow-up on this health maintenance. Physical Exam   Constitutional: He is oriented to person, place, and time. He appears well-developed and well-nourished. No distress. HENT:   Head: Normocephalic and atraumatic. Mouth/Throat: Normal dentition. Eyes: Right eye exhibits no discharge. Left eye exhibits no discharge. No scleral icterus. Neck: Neck supple. No JVD present. Carotid bruit is not present. No thyromegaly present. Cardiovascular: Normal rate, regular rhythm, S1 normal, S2 normal, normal heart sounds and intact distal pulses. Exam reveals no gallop and no friction rub. No murmur heard. Pulmonary/Chest: Effort normal and breath sounds normal. He has no wheezes. He has no rales. Abdominal: Soft. He exhibits no mass. There is no abdominal tenderness. Musculoskeletal:         General: Edema (trace) present. Lymphadenopathy:        Right cervical: No superficial cervical adenopathy present. Left cervical: No superficial cervical adenopathy present.    Neurological: He is alert and oriented to person, place, and time. Skin: Skin is warm and dry. No rash noted. Psychiatric: He has a normal mood and affect. His behavior is normal.       ASSESSMENT and PLAN  HLD Lipid Complete PanelResulted: 2/11/2020 3:04 PM  [address]  Component Name Value Ref Range   Cholesterol 141 110 - 200 mg/dL   Triglyceride 94 40 - 149 mg/dL   HDL 37 (L) >=40 mg/dL   Cholesterol/HDL 3.8 0.0 - 5.0    Non-HDL Cholesterol 104 <130 mg/dL   LDL CALCULATION 85 50 - 99 mg/dL   VLDL CALCULATION 19 8 - 30 mg/dL   LDL/HDL Ratio 2.3          Discussed the patient's above normal BMI with him. I have recommended the following interventions: dietary management education, guidance, and counseling . The BMI follow up plan is as follows: BMI is out of normal parameters and plan is as follows: I have counseled this patient on diet and exercise regimens    Patient advised to stop smoking to reduce future cardiovascular events. Has chronic diastolic CHF. Would avoid diuretics due to history of dizziness. Blood pressure seems to be controlled. 2/2020  Compensated diastolic CHF  Blood pressure controlled with current medications. May proceed with surgery with low cardiac risk. Encouraged smoking cessation to reduce the risk of future cardiovascular events. 9/20 CHF is compensated. NYHA class II. CAD stable. Blood pressure is controlled. Continue same medications. Diet weight and exercise discussed. Smoking cessation reinforced. Diagnoses and all orders for this visit:    1. Atherosclerosis of native coronary artery of native heart without angina pectoris    2. HTN (hypertension), benign    3. Mixed hyperlipidemia    4. Chronic diastolic congestive heart failure ([address]    5. Tobacco dependence    6. Severe obesity (BMI >= 40) (HCC)        Pertinent laboratory and test data reviewed and discussed with patient.   See patient instructions also for other medical advice given    Medications Discontinued During This Encounter   Medication Reason    alfuzosin SR (UROXATRAL) 10 mg SR tablet     nystatin (MYCOSTATIN) topical cream     sildenafil citrate (VIAGRA) 100 mg tablet     terbinafine HCl (LAMISIL) 1 % topical cream     thiothixene (NAVANE) 10 mg capsule     traZODone (DESYREL) 150 mg tablet        Follow-up and Dispositions    · Return in about 6 months (around 3/10/2021), or if symptoms worsen or fail to improve.

## 2020-09-10 NOTE — PROGRESS NOTES
Patient didn't bring medications, verbally reviewed. 1. Have you been to the ER, urgent care clinic since your last visit? Hospitalized since your last visit? Yes When: 07/22/20 Where: [facility] Reason for visit: Abdominal Pain    2. Have you seen or consulted any other health care providers outside of the [address] [place] [address] since your last visit? Include any pap smears or colon screening. Orthopedic/Back Pain    3. Since your last visit, have you had any of the following symptoms? No    4. Have you had any blood work, X-rays or cardiac testing? Yes Where: [facility] Reason for visit: Labs     Requested: NO     In [facility]: NO    5. Where do you normally have your labs drawn? [facility]    6. Do you need any refills today?    Yes

## 2021-03-11 ENCOUNTER — OFFICE VISIT (OUTPATIENT)
Dept: CARDIOLOGY CLINIC | Age: 61
End: 2021-03-11
Payer: COMMERCIAL

## 2021-03-11 VITALS
TEMPERATURE: 98.8 F | WEIGHT: 287 LBS | HEIGHT: 69 IN | SYSTOLIC BLOOD PRESSURE: 121 MMHG | DIASTOLIC BLOOD PRESSURE: 69 MMHG | HEART RATE: 86 BPM | OXYGEN SATURATION: 98 % | BODY MASS INDEX: 42.51 KG/M2

## 2021-03-11 DIAGNOSIS — I25.10 ATHEROSCLEROSIS OF NATIVE CORONARY ARTERY OF NATIVE HEART WITHOUT ANGINA PECTORIS: Primary | ICD-10-CM

## 2021-03-11 DIAGNOSIS — E66.01 SEVERE OBESITY (BMI >= 40) (HCC): ICD-10-CM

## 2021-03-11 DIAGNOSIS — F17.200 TOBACCO DEPENDENCE: ICD-10-CM

## 2021-03-11 DIAGNOSIS — E78.2 MIXED HYPERLIPIDEMIA: ICD-10-CM

## 2021-03-11 DIAGNOSIS — I10 ESSENTIAL HYPERTENSION: ICD-10-CM

## 2021-03-11 DIAGNOSIS — I50.32 CHRONIC DIASTOLIC CONGESTIVE HEART FAILURE (HCC): ICD-10-CM

## 2021-03-11 PROCEDURE — G8432 DEP SCR NOT DOC, RNG: HCPCS | Performed by: INTERNAL MEDICINE

## 2021-03-11 PROCEDURE — G8752 SYS BP LESS 140: HCPCS | Performed by: INTERNAL MEDICINE

## 2021-03-11 PROCEDURE — 99214 OFFICE O/P EST MOD 30 MIN: CPT | Performed by: INTERNAL MEDICINE

## 2021-03-11 PROCEDURE — 3017F COLORECTAL CA SCREEN DOC REV: CPT | Performed by: INTERNAL MEDICINE

## 2021-03-11 PROCEDURE — G8427 DOCREV CUR MEDS BY ELIG CLIN: HCPCS | Performed by: INTERNAL MEDICINE

## 2021-03-11 PROCEDURE — G8754 DIAS BP LESS 90: HCPCS | Performed by: INTERNAL MEDICINE

## 2021-03-11 PROCEDURE — G8417 CALC BMI ABV UP PARAM F/U: HCPCS | Performed by: INTERNAL MEDICINE

## 2021-03-11 RX ORDER — POTASSIUM CHLORIDE 1500 MG/1
20 TABLET, FILM COATED, EXTENDED RELEASE ORAL DAILY
Qty: 90 TAB | Refills: 1 | Status: SHIPPED | OUTPATIENT
Start: 2021-03-11 | End: 2021-11-02 | Stop reason: SDUPTHER

## 2021-03-11 RX ORDER — LOSARTAN POTASSIUM 100 MG/1
100 TABLET ORAL DAILY
Qty: 90 TAB | Refills: 3 | Status: SHIPPED | OUTPATIENT
Start: 2021-03-11 | End: 2021-09-23 | Stop reason: ALTCHOICE

## 2021-03-11 RX ORDER — AMLODIPINE BESYLATE 10 MG/1
10 TABLET ORAL DAILY
Qty: 90 TAB | Refills: 3 | Status: SHIPPED | OUTPATIENT
Start: 2021-03-11 | End: 2022-03-10 | Stop reason: SDUPTHER

## 2021-03-11 RX ORDER — HYDROCHLOROTHIAZIDE 25 MG/1
25 TABLET ORAL DAILY
Qty: 90 TAB | Refills: 3 | Status: SHIPPED | OUTPATIENT
Start: 2021-03-11 | End: 2021-09-23 | Stop reason: SDUPTHER

## 2021-03-11 RX ORDER — PRAVASTATIN SODIUM 40 MG/1
40 TABLET ORAL
Qty: 90 TAB | Refills: 1 | Status: SHIPPED | OUTPATIENT
Start: 2021-03-11 | End: 2022-03-10 | Stop reason: SDUPTHER

## 2021-03-11 RX ORDER — FUROSEMIDE 20 MG/1
20 TABLET ORAL
Qty: 90 TAB | Refills: 1 | Status: SHIPPED | OUTPATIENT
Start: 2021-03-11 | End: 2021-07-19 | Stop reason: SDUPTHER

## 2021-03-11 NOTE — PROGRESS NOTES
HISTORY OF PRESENT ILLNESS  Dahlia Abarca is a 63 y.o. male. Follow-up of CAD, hypertension, chronic diastolic CHF, hyperlipidemia, morbid obesity and tobacco abuse    Hypertension  The history is provided by the patient and medical records. This is a chronic problem. Associated symptoms include shortness of breath. Pertinent negatives include no chest pain and no headaches. CHF  The history is provided by the medical records. This is a chronic problem. Associated symptoms include shortness of breath. Pertinent negatives include no chest pain and no headaches. Cholesterol Problem  The history is provided by the medical records. This is a chronic problem. Associated symptoms include shortness of breath. Pertinent negatives include no chest pain and no headaches. Shortness of Breath  The history is provided by the patient. This is a new problem. The problem occurs intermittently. The current episode started more than 1 week ago. The problem has been resolved. Associated symptoms include neck pain. Pertinent negatives include no fever, no headaches, no cough, no wheezing, no PND, no orthopnea, no chest pain, no vomiting, no rash, no leg swelling and no claudication. The problem's precipitants include exercise (2 flights; 9/20 walking 1 hr;). Review of Systems   Constitutional: Negative for chills, diaphoresis, fever, malaise/fatigue and weight loss. HENT: Negative for nosebleeds. Eyes: Negative for discharge. Respiratory: Positive for shortness of breath. Negative for cough and wheezing. Cardiovascular: Negative for chest pain, palpitations, orthopnea, claudication, leg swelling and PND. Gastrointestinal: Negative for diarrhea, nausea and vomiting. Genitourinary: Negative for dysuria and hematuria. Musculoskeletal: Positive for back pain, joint pain and neck pain. Skin: Negative for rash. Neurological: Negative for dizziness, seizures, loss of consciousness and headaches. Endo/Heme/Allergies: Negative for polydipsia. Does not bruise/bleed easily. Psychiatric/Behavioral: Negative for depression and substance abuse. The patient does not have insomnia.       Allergies   Allergen Reactions    Other Food Hives     \"double-crabs\"  Does fine with all other seafood    Seafood Hives     \"deviled-crabs\"  Does fine with all other seafood    Aspirin Hives     Other reaction(s): facial swelling     Grass Pollen Other (comments)     Eye swollen    Simvastatin Other (comments)     Pt states that he is not allergic    Tomato Hives       Past Medical History:   Diagnosis Date    Adverse effect of anesthesia     sx cx due to blood pressure drop    Allergic rhinitis     Asthma     Atherosclerosis of native coronary artery with angina pectoris ([address] 5/14/2015    atypical angina, mild stress abnormality may be artifact nl EF by echo     Benign non-nodular prostatic hyperplasia with lower urinary tract symptoms     BPH with obstruction/lower urinary tract symptoms     Bulbous urethral stricture     Chronic epididymitis     Chronic pain     shoulder, neck, knee    Coronary artery disease involving native coronary artery of native heart with angina pectoris ([address] 5/13/2016    chr atypical CP for few seconds only     Diabetes mellitus ([address]     DM (diabetes mellitus) ([address]     DVT (deep venous thrombosis) ([address] 2013    left leg    Emphysema     Epididymal cyst     Erectile dysfunction of organic origin     Essential hypertension     GERD (gastroesophageal reflux disease)     High cholesterol     Hypertension     Impotence     Incomplete bladder emptying     Incontinence     Intermittent urinary stream     Kidney stone     Lower urinary tract symptoms (LUTS)     Microhematuria     Morbid obesity ([address] 4/1/2015    Multiple trauma     LEFT SHOULDER, LEFT WRIST, NECK, AND BACK    Other and unspecified hyperlipidemia 4/1/2015    Paranoid schizophrenia ([address]     Pre-op testing     Preop cardiovascular exam 11/13/2015    wrist surgery; no significant CP; abnl stress ? artifact cleared with mild risk periop     Sciatica     Severe obesity (BMI >= 40) [facility] 11/13/2015    Sinusitis     Spermatocele of epididymis     Type II or unspecified type diabetes mellitus without mention of complication, not stated as uncontrolled 4/1/2015    Urethral stricture     Urinary urgency     Urine leukocytes     Weak urinary stream        Family History   Problem Relation Age of Onset    Stroke Mother 79    Heart Disease Mother     Diabetes Father     Cancer Brother     Heart Attack Neg Hx        Social History     Tobacco Use    Smoking status: Current Every Day Smoker     Packs/day: 0.25     Years: 32.00     Pack years: 8.00     Types: Cigarettes     Start date: 8/1/1976    Smokeless tobacco: Former User    Tobacco comment: per patient stated 1 or 2 a day ready to stop   Substance Use Topics    Alcohol use: No     Alcohol/week: 0.0 standard drinks    Drug use: No        Current Outpatient Medications   Medication Sig    tamsulosin (FLOMAX) 0.4 mg capsule TAKE 2 CAPSULES BY MOUTH ONCE DAILY AT BEDTIME    budesonide (PULMICORT) 1 mg/2 mL nbsp     Antiseptic Skin Clnsr,chlorhe, 4 % liquid APPLY 1 APPLICATION(S) EVERY DAY BY TOPICAL ROUTE FOR 10 DAYS.  fexofenadine (ALLEGRA) 180 mg tablet TAKE 1 TABLET BY MOUTH EVERY DAY IN THE MORNING    cetirizine (ZYRTEC) 10 mg tablet TAKE ONE TABLET BY MOUTH EVERY EVENING    HumaLOG Mix 75-25 KwikPen flexpen     finasteride (PROSCAR) 5 mg tablet Take 1 Tab by mouth daily.  tadalafiL (CIALIS) 5 mg tablet Take 1 Tab by mouth daily. For ED. Indications: the inability to have an erection    prazosin (MINIPRESS) 5 mg capsule TAKE 1 CAP BY MOUTH NIGHTLY.  levocetirizine (XYZAL) 5 mg tablet TAKE 1 TABLET BY MOUTH EVERY DAY    montelukast (SINGULAIR) 10 mg tablet as needed.  escitalopram oxalate (LEXAPRO) 20 mg tablet as needed.     furosemide (LASIX) 20 mg tablet furosemide 20 mg tablet   Take 1 tablet every day by oral route.  losartan (COZAAR) 100 mg tablet Take 100 mg by mouth daily.  potassium chloride SR (KLOR-CON 10) 10 mEq tablet Take  by mouth.  om 3/E/linol/ala/oleic/gla/lip (OMEGA 3-6-9 PO) Take  by mouth.  olopatadine (PAZEO) 0.7 % drop Pazeo 0.7 % eye drops   INSTILL 1 DROP ONCE DAILY AS NEEDED FOR ITCHING OR WATERINESS    diclofenac (FLECTOR) 1.3 % pt12 Flector 1.3 % transdermal 12 hour patch   APPLY ONE PATCH EVERY 12 HOURS AS NEEDED FOR PAIN    ergocalciferol (ERGOCALCIFEROL) 50,000 unit capsule Take 1 capsule every week by oral route.  lurasidone (LATUDA) 20 mg tab tablet as needed.  naloxone (NARCAN) 4 mg/actuation nasal spray Narcan 4 mg/actuation nasal spray    amLODIPine (NORVASC) 10 mg tablet Take 1 Tab by mouth daily.  hydroCHLOROthiazide (HYDRODIURIL) 25 mg tablet Take 1 Tab by mouth daily.     cromolyn (OPTICROM) 4 % ophthalmic solution cromolyn 4 % eye drops   INSTILL 1 DROP INTO AFFECTED EYE(S) BY OPHTHALMIC ROUTE 4 TIMES PER DAY    alcohol swabs (ALCOHOL PREP PADS) padm Alcohol Prep Pads   use as directed prior to insulin injection 1-2 times daily    Insulin Needles, Disposable, (BD ULTRA-FINE SHORT PEN NEEDLE) 31 gauge x 5/16\" ndle BD Ultra-Fine Short Pen Needle 31 gauge x 5/16\"   USE TO TEST TWICE DAILY    pen needle, diabetic (COMFORT EZ PEN NEEDLES) 32 gauge x 3/16\" ndle Comfort EZ Pen Needles 32 gauge x 3/16\"    oxyCODONE-acetaminophen (PERCOCET 10)  mg per tablet oxycodone-acetaminophen 7.5 mg-500 mg tablet    glucose blood VI test strips (ONETOUCH ULTRA BLUE TEST STRIP) strip OneTouch Ultra Blue Test Strip   TO CHECK BLOOD SUGAR TWICE A DAY    benztropine (COGENTIN) 0.5 mg tablet benztropine 0.5 mg tablet    citalopram (CELEXA) 40 mg tablet citalopram 40 mg tablet    diclofenac (VOLTAREN) 1 % gel Voltaren 1 % topical gel    Back Brace misc 1 Each by Does Not Apply route daily. Please order  back brace for help with patient stability and balance  ( [employer] [facility] )    amantadine HCl (SYMMETREL) 100 mg capsule as needed.  ONETOUCH ULTRA TEST strip USED TO CHECK BLOOD SUGAR TWICE A DAY- DX: E11.9    LINZESS 145 mcg cap capsule as needed.  rivaroxaban (XARELTO) 15 mg tab tablet Take 10 mg by mouth daily (with breakfast).  pravastatin (PRAVACHOL) 40 mg tablet Take 40 mg by mouth nightly.  gabapentin (NEURONTIN) 300 mg capsule Take 1 Cap by mouth three (3) times daily. For nerve pain. 1 cap qhs for 1 wk, then increase to 1 cap BID for 1 wk, then increase to 1 cap TID    albuterol (PROVENTIL HFA, VENTOLIN HFA, PROAIR HFA) 90 mcg/actuation inhaler 2 Puffs every four (4) hours as needed.  ammonium lactate (LAC-HYDRIN) 12 % lotion Apply  to affected area as needed (feet).  Azelastine (ASTEPRO) 0.15 % (205.5 mcg) nasal spray 1 [place] by Both Nostrils route.  polyethylene glycol (MIRALAX) 17 gram/dose powder 17 g as needed.  fluticasone (FLONASE) 50 mcg/actuation nasal spray Two squirts both nostrils at bedtime    cyclobenzaprine (FLEXERIL) 5 mg tablet Take 5 mg by mouth three (3) times daily as needed for Muscle Spasm(s). Takes 1 to 2 tablets    topiramate (TOPAMAX) 200 mg tablet Take 200 mg by mouth nightly. 2 tabs at night prn  Indications: migraine prevention     No current facility-administered medications for this visit.          Past Surgical History:   Procedure Laterality Date    HX CARPAL TUNNEL RELEASE Left     HX HEENT  2017    sinus surgery    HX OTHER SURGICAL  2008    sinus    HX OTHER SURGICAL  2009    stress test    HX OTHER SURGICAL  03/31/2016    EPIDURAL INJECTIONS    HX SHOULDER ARTHROSCOPY Left     [facility]  [facility]    [facility], Dr. Jay Conn  UROLOGICAL  09/12/2017    [address], Cysto, direct visualized internal urethrotomy, Dr. Ezekiel Harper HX UROLOGICAL  02/18/2020 Cystourethroscopy/Urethroplasty - [facility] - Dr. Laine Hanna       Diagnostic Studies:  No flowsheet data found. 7/17 Nuc Stress  IMPRESSION:     1. No evidence of ischemia. 2.  Ejection fraction calculated at 33%    7/17 ECHO  1. Normal LV size and systolic function. EF ~ 55%  2. Grade I diastolic dysfunction. 3. No significant valvular pathology. Visit Vitals  /69 (BP 1 Location: Right arm, BP Patient Position: Sitting, BP Cuff Size: Large adult)   Pulse 86   Temp 98.8 °F (37.1 °C) (Temporal)   Ht 5' 9\" (1.753 m)   Wt 130.2 kg (287 lb)   SpO2 98%   BMI 42.38 kg/m²       Mr. Zander Carter has a reminder for a \"due or due soon\" health maintenance. I have asked that he contact his primary care provider for follow-up on this health maintenance. Physical Exam   Constitutional: He is oriented to person, place, and time. He appears well-developed and well-nourished. No distress. HENT:   Head: Normocephalic and atraumatic. Mouth/Throat: Normal dentition. Eyes: Right eye exhibits no discharge. Left eye exhibits no discharge. No scleral icterus. Neck: Neck supple. No JVD present. Carotid bruit is not present. No thyromegaly present. Cardiovascular: Normal rate, regular rhythm, S1 normal, S2 normal, normal heart sounds and intact distal pulses. Exam reveals no gallop and no friction rub. No murmur heard. Pulmonary/Chest: Effort normal and breath sounds normal. He has no wheezes. He has no rales. Abdominal: Soft. He exhibits no mass. There is no abdominal tenderness. Musculoskeletal:         General: Edema (trace) present. Lymphadenopathy:        Right cervical: No superficial cervical adenopathy present. Left cervical: No superficial cervical adenopathy present. Neurological: He is alert and oriented to person, place, and time. Skin: Skin is warm and dry. No rash noted. Psychiatric: He has a normal mood and affect.  His behavior is normal.       ASSESSMENT and PLAN  HLD : Lipid Complete PanelResulted: 3/1/2021 4:58 PM  [address]  Component Name Value Ref Range   Cholesterol 124 110 - 200 mg/dL   Triglyceride 137 40 - 149 mg/dL   HDL 32 (L) >=40 mg/dL   Cholesterol/HDL 3.9 0.0 - 5.0    Non-HDL Cholesterol 92 <130 mg/dL   LDL CALCULATION 64 50 - 99 mg/dL   VLDL CALCULATION 27 8 - 30 mg/dL   LDL/HDL Ratio 2.0       Discussed the patient's above normal BMI with him. I have recommended the following interventions: dietary management education, guidance, and counseling . The BMI follow up plan is as follows: BMI is out of normal parameters and plan is as follows: I have counseled this patient on diet and exercise regimens    Patient advised to stop smoking to reduce future cardiovascular events. Has chronic diastolic CHF. Would avoid diuretics due to history of dizziness. Blood pressure seems to be controlled. 2/2020  Compensated diastolic CHF  Blood pressure controlled with current medications. May proceed with surgery with low cardiac risk. Encouraged smoking cessation to reduce the risk of future cardiovascular events. 9/20 CHF is compensated. NYHA class II. CAD stable. Blood pressure is controlled. Continue same medications. Diet weight and exercise discussed. Smoking cessation reinforced. Diagnoses and all orders for this visit:    1. Atherosclerosis of native coronary artery of native heart without angina pectoris    2. Essential hypertension  -     furosemide (LASIX) 20 mg tablet; Take 1 Tab by mouth daily as needed (edema). -     losartan (COZAAR) 100 mg tablet; Take 1 Tab by mouth daily. -     potassium chloride SR (K-TAB) 20 mEq tablet; Take 1 Tab by mouth daily. -     amLODIPine (NORVASC) 10 mg tablet; Take 1 Tab by mouth daily. -     hydroCHLOROthiazide (HYDRODIURIL) 25 mg tablet; Take 1 Tab by mouth daily. 3. Mixed hyperlipidemia  -     pravastatin (PRAVACHOL) 40 mg tablet; Take 1 Tab by mouth nightly.     4. Chronic diastolic congestive heart failure ([facility]    5. Tobacco dependence    6. Severe obesity (BMI >= 40) ([facility])        Pertinent laboratory and test data reviewed and discussed with patient. See patient instructions also for other medical advice given    Medications Discontinued During This Encounter   Medication Reason    amoxicillin-clavulanate (AUGMENTIN) 875-125 mg per tablet Therapy Completed    Levemir FlexTouch U-100 Insuln 100 unit/mL (3 mL) inpn Therapy Completed    NovoLOG Mix 70-30FlexPen U-100 100 unit/mL (70-30) inpn Therapy Completed    sildenafiL, pulmonary hypertension, (Revatio) 20 mg tablet Therapy Completed    pravastatin (PRAVACHOL) 40 mg tablet REORDER    hydroCHLOROthiazide (HYDRODIURIL) 25 mg tablet REORDER    amLODIPine (NORVASC) 10 mg tablet REORDER    losartan (COZAAR) 100 mg tablet REORDER    potassium chloride SR (KLOR-CON 10) 10 mEq tablet REORDER    furosemide (LASIX) 20 mg tablet REORDER       Follow-up and Dispositions    · Return in about 6 months (around 9/11/2021), or if symptoms worsen or fail to improve. 3/11/2021 CAD stable. CHF compensated NYHA class II  Continues to smoke discussed various ways encouraged to educate himself and read more about smoking as it and its effects. Blood pressure is controlled. Continue same medications. Lipids are controlled well. Continue statins. Recommended weight loss with which she agrees. Printed Mediterranean diet guidelines.

## 2021-03-11 NOTE — PROGRESS NOTES
Patient didn't bring medications, verbally reviewed. 1. Have you been to the ER, urgent care clinic since your last visit? Hospitalized since your last visit? Yes When: 03/08/21 Where: [facility] Reason for visit: Papular rash    2. Have you seen or consulted any other health care providers outside of the [address] [place] [address] since your last visit? Include any pap smears or colon screening. Yes Where: [facility]thopedic     3. Since your last visit, have you had any of the following symptoms? No    4. Have you had any blood work, X-rays or cardiac testing? No    5. Where do you normally have your labs drawn? [facility]    6. Do you need any refills today?    Yes

## 2021-03-11 NOTE — PATIENT INSTRUCTIONS
Medications Discontinued During This Encounter   Medication Reason    amoxicillin-clavulanate (AUGMENTIN) 875-125 mg per tablet Therapy Completed    Levemir FlexTouch U-100 Insuln 100 unit/mL (3 mL) inpn Therapy Completed    NovoLOG Mix 70-30FlexPen U-100 100 unit/mL (70-30) inpn Therapy Completed    sildenafiL, pulmonary hypertension, (Revatio) 20 mg tablet Therapy Completed    pravastatin (PRAVACHOL) 40 mg tablet REORDER    hydroCHLOROthiazide (HYDRODIURIL) 25 mg tablet REORDER    amLODIPine (NORVASC) 10 mg tablet REORDER    losartan (COZAAR) 100 mg tablet REORDER    potassium chloride SR (KLOR-CON 10) 10 mEq tablet REORDER    furosemide (LASIX) 20 mg tablet REORDER          Learning About the Mediterranean Diet  What is the Mediterranean diet? The Mediterranean diet is a style of eating rather than a diet plan. It features foods eaten in [place], [place], [place], and other countries along the [facility]. It emphasizes eating foods like fish, fruits, vegetables, beans, high-fiber breads and whole grains, nuts, and olive oil. This style of eating includes limited red meat, cheese, and sweets. Why choose the Mediterranean diet? A Mediterranean-style diet may improve heart health. It contains more fat than other heart-healthy diets. But the fats are mainly from nuts, unsaturated oils (such as fish oils and olive oil), and certain nut or seed oils (such as canola, soybean, or flaxseed oil). These fats may help protect the heart and blood vessels. How can you get started on the Mediterranean diet? Here are some things you can do to switch to a more Mediterranean way of eating. What to eat  · Eat a variety of fruits and vegetables each day, such as grapes, blueberries, tomatoes, broccoli, peppers, figs, olives, spinach, eggplant, beans, lentils, and chickpeas.   · Eat a variety of whole-grain foods each day, such as oats, brown rice, and whole wheat bread, pasta, and couscous. · Eat fish at least 2 times a week. Try tuna, salmon, mackerel, lake trout, herring, or sardines. · Eat moderate amounts of low-fat dairy products, such as milk, cheese, or yogurt. · Eat moderate amounts of poultry and eggs. · Choose healthy (unsaturated) fats, such as nuts, olive oil, and certain nut or seed oils like canola, soybean, and flaxseed. · Limit unhealthy (saturated) fats, such as butter, palm oil, and coconut oil. And limit fats found in animal products, such as meat and dairy products made with whole milk. Try to eat red meat only a few times a month in very small amounts. · Limit sweets and desserts to only a few times a week. This includes sugar-sweetened drinks like soda. The Mediterranean diet may also include red wine with your meal--1 glass each day for women and up to 2 glasses a day for men. Tips for eating at home  · Use herbs, spices, garlic, lemon zest, and citrus juice instead of salt to add flavor to foods. · Add avocado slices to your sandwich instead of lam. · Have fish for lunch or dinner instead of red meat. Brush the fish with olive oil, and broil or grill it. · Sprinkle your salad with seeds or nuts instead of cheese. · Cook with olive or canola oil instead of butter or oils that are high in saturated fat. · Switch from 2% milk or whole milk to 1% or fat-free milk. · Dip raw vegetables in a vinaigrette dressing or hummus instead of dips made from mayonnaise or sour cream.  · Have a piece of fruit for dessert instead of a piece of cake. Try baked apples, or have some dried fruit. Tips for eating out  · Try broiled, grilled, baked, or poached fish instead of having it fried or breaded. · Ask your  to have your meals prepared with olive oil instead of butter. · Order dishes made with marinara sauce or sauces made from olive oil. Avoid sauces made from cream or mayonnaise.   · Choose whole-grain breads, whole wheat pasta and pizza crust, brown rice, beans, and lentils. · Cut back on butter or margarine on bread. Instead, you can dip your bread in a small amount of olive oil. · Ask for a side salad or grilled vegetables instead of french fries or chips. Where can you learn more? Go to http://www.funk.com/  Enter O407 in the search box to learn more about \"Learning About the Mediterranean Diet. \"  Current as of: August 22, 2019               Content Version: 12.6  © 2598-8422 [employer]. Care instructions adapted under license by [employer] (which disclaims liability or warranty for this information). If you have questions about a medical condition or this instruction, always ask your healthcare professional. [address] any warranty or liability for your use of this information. Learning About Benefits From Quitting Smoking  How does quitting smoking make you healthier? If you're thinking about quitting smoking, you may have a few reasons to be smoke-free. Your health may be one of them. · When you quit smoking, you lower your risks for cancer, lung disease, heart attack, stroke, blood vessel disease, and blindness from macular degeneration. · When you're smoke-free, you get sick less often, and you heal faster. You are less likely to get colds, flu, bronchitis, and pneumonia. · As a nonsmoker, you may find that your mood is better and you are less stressed. When and how will you feel healthier? Quitting has real health benefits that start from day 1 of being smoke-free. And the longer you stay smoke-free, the healthier you get and the better you feel. The first hours  · After just 20 minutes, your blood pressure and heart rate go down. That means there's less stress on your heart and blood vessels. · Within 12 hours, the level of carbon monoxide in your blood drops back to normal. That makes room for more oxygen.  With more oxygen in your body, you may notice that you have more energy than when you smoked. After 2 weeks  · Your lungs start to work better. · Your risk of heart attack starts to drop. After 1 month  · When your lungs are clear, you cough less and breathe deeper, so it's easier to be active. · Your sense of taste and smell return. That means you can enjoy food more than you have since you started smoking. Over the years  · Over the years, your risks of heart disease, heart attack, and stroke are lower. · After 10 years, your risk of dying from lung cancer is cut by about half. And your risk for many other types of cancer is lower too. How would quitting help others in your life? When you quit smoking, you improve the health of everyone who now breathes in your smoke. · Their heart, lung, and cancer risks drop, much like yours. · They are sick less. For babies and small children, living smoke-free means they're less likely to have ear infections, pneumonia, and bronchitis. · If you're a woman who is or will be pregnant someday, quitting smoking means a healthier . · Children who are close to you are less likely to become adult smokers. Where can you learn more? Go to http://www.gray.com/  Enter O319 in the search box to learn more about \"Learning About Benefits From Quitting Smoking. \"  Current as of: 2020               Content Version: 12.6  © 1597-1482 [employer]. Care instructions adapted under license by [employer] (which disclaims liability or warranty for this information). If you have questions about a medical condition or this instruction, always ask your healthcare professional. [place] [address] any warranty or liability for your use of this information.

## 2021-07-19 DIAGNOSIS — I10 ESSENTIAL HYPERTENSION: ICD-10-CM

## 2021-07-19 RX ORDER — FUROSEMIDE 20 MG/1
20 TABLET ORAL
Qty: 90 TABLET | Refills: 1 | Status: SHIPPED | OUTPATIENT
Start: 2021-07-19 | End: 2022-03-10 | Stop reason: SDUPTHER

## 2021-08-03 PROBLEM — I10 HYPERTENSION: Status: RESOLVED | Noted: 2021-08-03 | Resolved: 2021-08-03

## 2021-08-03 PROBLEM — N52.9 IMPOTENCE: Status: RESOLVED | Noted: 2018-10-31 | Resolved: 2021-08-03

## 2021-09-23 ENCOUNTER — OFFICE VISIT (OUTPATIENT)
Dept: CARDIOLOGY CLINIC | Age: 61
End: 2021-09-23
Payer: COMMERCIAL

## 2021-09-23 VITALS
HEART RATE: 72 BPM | DIASTOLIC BLOOD PRESSURE: 74 MMHG | HEIGHT: 69 IN | BODY MASS INDEX: 42.65 KG/M2 | WEIGHT: 288 LBS | SYSTOLIC BLOOD PRESSURE: 132 MMHG | OXYGEN SATURATION: 97 %

## 2021-09-23 DIAGNOSIS — I10 ESSENTIAL HYPERTENSION: Primary | ICD-10-CM

## 2021-09-23 DIAGNOSIS — E66.01 SEVERE OBESITY (BMI >= 40) (HCC): ICD-10-CM

## 2021-09-23 DIAGNOSIS — I25.10 ATHEROSCLEROSIS OF NATIVE CORONARY ARTERY OF NATIVE HEART WITHOUT ANGINA PECTORIS: ICD-10-CM

## 2021-09-23 DIAGNOSIS — Z01.810 PREOP CARDIOVASCULAR EXAM: ICD-10-CM

## 2021-09-23 DIAGNOSIS — E78.2 MIXED HYPERLIPIDEMIA: ICD-10-CM

## 2021-09-23 DIAGNOSIS — I50.32 CHRONIC DIASTOLIC CONGESTIVE HEART FAILURE (HCC): ICD-10-CM

## 2021-09-23 DIAGNOSIS — F17.200 TOBACCO DEPENDENCE: ICD-10-CM

## 2021-09-23 PROCEDURE — 3017F COLORECTAL CA SCREEN DOC REV: CPT | Performed by: INTERNAL MEDICINE

## 2021-09-23 PROCEDURE — G8427 DOCREV CUR MEDS BY ELIG CLIN: HCPCS | Performed by: INTERNAL MEDICINE

## 2021-09-23 PROCEDURE — G8754 DIAS BP LESS 90: HCPCS | Performed by: INTERNAL MEDICINE

## 2021-09-23 PROCEDURE — 93000 ELECTROCARDIOGRAM COMPLETE: CPT | Performed by: INTERNAL MEDICINE

## 2021-09-23 PROCEDURE — G8752 SYS BP LESS 140: HCPCS | Performed by: INTERNAL MEDICINE

## 2021-09-23 PROCEDURE — 99214 OFFICE O/P EST MOD 30 MIN: CPT | Performed by: INTERNAL MEDICINE

## 2021-09-23 PROCEDURE — G8417 CALC BMI ABV UP PARAM F/U: HCPCS | Performed by: INTERNAL MEDICINE

## 2021-09-23 PROCEDURE — G8432 DEP SCR NOT DOC, RNG: HCPCS | Performed by: INTERNAL MEDICINE

## 2021-09-23 RX ORDER — HYDROCHLOROTHIAZIDE 25 MG/1
25 TABLET ORAL DAILY
Qty: 90 TABLET | Refills: 3 | Status: SHIPPED | OUTPATIENT
Start: 2021-09-23 | End: 2022-03-10 | Stop reason: SDUPTHER

## 2021-09-23 RX ORDER — VALSARTAN 320 MG/1
TABLET ORAL
COMMUNITY
Start: 2021-08-23 | End: 2022-09-22 | Stop reason: SDUPTHER

## 2021-09-23 NOTE — PROGRESS NOTES
1. Have you been to the ER, urgent care clinic since your last visit? Hospitalized since your last visit? 2 No     2. Have you seen or consulted any other health care providers outside of the [address] [place] [address] since your last visit? Include any pap smears or colon screening. Yes Where: [address]     3. Since your last visit, have you had any of the following symptoms? None         4. Have you had any blood work, X-rays or cardiac testing? Yes [facility]     Requested: NO     In [facility]: YES    5. Where do you normally have your labs drawn? [facility]     6. Do you need any refills today?    No

## 2021-09-23 NOTE — PATIENT INSTRUCTIONS
Medications Discontinued During This Encounter   Medication Reason    cetirizine (ZYRTEC) 10 mg tablet Not A Current Medication    diclofenac (FLECTOR) 1.3 % pt12 Not A Current Medication    LINZESS 145 mcg cap capsule Therapy Completed    lurasidone (LATUDA) 20 mg tab tablet DISCONTINUED BY ANOTHER CLINICIAN    sildenafiL, pulmonary hypertension, (REVATIO) 20 mg tablet Not A Current Medication    tadalafiL (CIALIS) 5 mg tablet Not A Current Medication    hydroCHLOROthiazide (HYDRODIURIL) 25 mg tablet REORDER    losartan (COZAAR) 100 mg tablet Alternate Therapy          Learning About the Mediterranean Diet  What is the Mediterranean diet? The Mediterranean diet is a style of eating rather than a diet plan. It features foods eaten in [place], [place], [place] and [place], and other countries along the [facility]. It emphasizes eating foods like fish, fruits, vegetables, beans, high-fiber breads and whole grains, nuts, and olive oil. This style of eating includes limited red meat, cheese, and sweets. Why choose the Mediterranean diet? A Mediterranean-style diet may improve heart health. It contains more fat than other heart-healthy diets. But the fats are mainly from nuts, unsaturated oils (such as fish oils and olive oil), and certain nut or seed oils (such as canola, soybean, or flaxseed oil). These fats may help protect the heart and blood vessels. How can you get started on the Mediterranean diet? Here are some things you can do to switch to a more Mediterranean way of eating. What to eat  · Eat a variety of fruits and vegetables each day, such as grapes, blueberries, tomatoes, broccoli, peppers, figs, olives, spinach, eggplant, beans, lentils, and chickpeas. · Eat a variety of whole-grain foods each day, such as oats, brown rice, and whole wheat bread, pasta, and couscous. · Eat fish at least 2 times a week. Try tuna, salmon, mackerel, lake trout, herring, or sardines.   · Eat moderate amounts of low-fat dairy products, such as milk, cheese, or yogurt. · Eat moderate amounts of poultry and eggs. · Choose healthy (unsaturated) fats, such as nuts, olive oil, and certain nut or seed oils like canola, soybean, and flaxseed. · Limit unhealthy (saturated) fats, such as butter, palm oil, and coconut oil. And limit fats found in animal products, such as meat and dairy products made with whole milk. Try to eat red meat only a few times a month in very small amounts. · Limit sweets and desserts to only a few times a week. This includes sugar-sweetened drinks like soda. The Mediterranean diet may also include red wine with your meal--1 glass each day for women and up to 2 glasses a day for men. Tips for eating at home  · Use herbs, spices, garlic, lemon zest, and citrus juice instead of salt to add flavor to foods. · Add avocado slices to your sandwich instead of lam. · Have fish for lunch or dinner instead of red meat. Brush the fish with olive oil, and broil or grill it. · Sprinkle your salad with seeds or nuts instead of cheese. · Cook with olive or canola oil instead of butter or oils that are high in saturated fat. · Switch from 2% milk or whole milk to 1% or fat-free milk. · Dip raw vegetables in a vinaigrette dressing or hummus instead of dips made from mayonnaise or sour cream.  · Have a piece of fruit for dessert instead of a piece of cake. Try baked apples, or have some dried fruit. Tips for eating out  · Try broiled, grilled, baked, or poached fish instead of having it fried or breaded. · Ask your  to have your meals prepared with olive oil instead of butter. · Order dishes made with marinara sauce or sauces made from olive oil. Avoid sauces made from cream or mayonnaise. · Choose whole-grain breads, whole wheat pasta and pizza crust, brown rice, beans, and lentils. · Cut back on butter or margarine on bread.  Instead, you can dip your bread in a small amount of olive oil. · Ask for a side salad or grilled vegetables instead of french fries or chips. Where can you learn more? Go to http://www.[employer].com/  Enter O407 in the search box to learn more about \"Learning About the Mediterranean Diet. \"  Current as of: December 17, 2020               Content Version: 13.0  © 5867-2630 [employer]. Care instructions adapted under license by [employer] (which disclaims liability or warranty for this information). If you have questions about a medical condition or this instruction, always ask your healthcare professional. [place] [address] any warranty or liability for your use of this information.

## 2021-09-23 NOTE — PROGRESS NOTES
HISTORY OF PRESENT ILLNESS  Robin Class is a 57 y.o. male. Follow-up of CAD, hypertension, chronic diastolic CHF, hyperlipidemia, morbid obesity and tobacco abuse    9/21 seen for preoperative clearance in addition to regular follow-up. Surgeries fistulotomy or excision of thrombosed hemorrhoids with anal fistula    Shortness of Breath  The history is provided by the patient. This is a chronic problem. The problem occurs intermittently. The current episode started more than 1 week ago. The problem has been resolved. Associated symptoms include neck pain. Pertinent negatives include no fever, no headaches, no cough, no wheezing, no PND, no orthopnea, no chest pain, no vomiting, no rash, no leg swelling and no claudication. The problem's precipitants include exercise (2 flights; 9/20 walking 1 hr;). Follow-up  Associated symptoms include shortness of breath. Pertinent negatives include no chest pain and no headaches. Review of Systems   Constitutional: Negative for chills, diaphoresis, fever, malaise/fatigue and weight loss. HENT: Negative for nosebleeds. Eyes: Negative for discharge. Respiratory: Positive for shortness of breath. Negative for cough and wheezing. Cardiovascular: Negative for chest pain, palpitations, orthopnea, claudication, leg swelling and PND. Gastrointestinal: Negative for diarrhea, nausea and vomiting. Genitourinary: Negative for dysuria and hematuria. Musculoskeletal: Positive for back pain, joint pain and neck pain. Skin: Negative for rash. Neurological: Negative for dizziness, seizures, loss of consciousness and headaches. Endo/Heme/Allergies: Negative for polydipsia. Does not bruise/bleed easily. Psychiatric/Behavioral: Negative for depression and substance abuse. The patient does not have insomnia.       Allergies   Allergen Reactions    Other Food Hives     \"double-crabs\"  Does fine with all other seafood    Seafood Hives     \"deviled-crabs\"  Does fine with all other seafood    Aspirin Hives     Other reaction(s): facial swelling     Grass Pollen Other (comments)     Eye swollen    Simvastatin Other (comments)     Pt states that he is not allergic    Tomato Hives       Past Medical History:   Diagnosis Date    Adverse effect of anesthesia     sx cx due to blood pressure drop    Allergic rhinitis     Asthma     Atherosclerosis of native coronary artery with angina pectoris ([address] 5/14/2015    atypical angina, mild stress abnormality may be artifact nl EF by echo     Benign non-nodular prostatic hyperplasia with lower urinary tract symptoms     BPH with obstruction/lower urinary tract symptoms     Bulbous urethral stricture     Chronic epididymitis     Chronic pain     shoulder, neck, knee    Coronary artery disease involving native coronary artery of native heart with angina pectoris ([address] 5/13/2016    chr atypical CP for few seconds only     Diabetes mellitus ([address]     DM (diabetes mellitus) ([address]     DVT (deep venous thrombosis) ([address] 2013    left leg    Emphysema     Epididymal cyst     Erectile dysfunction of organic origin     Essential hypertension     GERD (gastroesophageal reflux disease)     High cholesterol     Hypertension     Impotence     Incomplete bladder emptying     Incontinence     Intermittent urinary stream     Kidney stone     Lower urinary tract symptoms (LUTS)     Microhematuria     Morbid obesity ([address] 4/1/2015    Multiple trauma     LEFT SHOULDER, LEFT WRIST, NECK, AND BACK    Other and unspecified hyperlipidemia 4/1/2015    Paranoid schizophrenia ([address]     Pre-op testing     Preop cardiovascular exam 11/13/2015    wrist surgery; no significant CP; abnl stress ? artifact cleared with mild risk periop     Sciatica     Severe obesity (BMI >= 40) (HCC) 11/13/2015    Sinusitis     Spermatocele of epididymis     Type II or unspecified type diabetes mellitus without mention of complication, not stated as uncontrolled 4/1/2015    Urethral stricture     Urinary urgency     Urine leukocytes     Weak urinary stream        Family History   Problem Relation Age of Onset    Stroke Mother 79    Heart Disease Mother     Diabetes Father     Cancer Brother     Heart Attack Neg Hx        Social History     Tobacco Use    Smoking status: Current Every Day Smoker     Packs/day: 0.25     Years: 32.00     Pack years: 8.00     Types: Cigarettes     Start date: 8/1/1976    Smokeless tobacco: Former User    Tobacco comment: per patient stated 1 or 2 a day ready to stop   Substance Use Topics    Alcohol use: No     Alcohol/week: 0.0 standard drinks    Drug use: No        Current Outpatient Medications   Medication Sig    valsartan (DIOVAN) 320 mg tablet     furosemide (LASIX) 20 mg tablet Take 1 Tablet by mouth daily as needed (edema).  tamsulosin (FLOMAX) 0.4 mg capsule Take 2 capsules by mouth before bedtime  Indications: enlarged prostate with urination problem    Symbicort 80-4.5 mcg/actuation HFAA     triamcinolone acetonide (KENALOG) 0.1 % ointment     hydrOXYzine HCL (ATARAX) 25 mg tablet Take 25 mg by mouth every four (4) hours as needed.  hydrocortisone (HYTONE) 2.5 % ointment     potassium chloride SR (K-TAB) 20 mEq tablet Take 1 Tab by mouth daily.  amLODIPine (NORVASC) 10 mg tablet Take 1 Tab by mouth daily.  pravastatin (PRAVACHOL) 40 mg tablet Take 1 Tab by mouth nightly.  budesonide (PULMICORT) 1 mg/2 mL nbsp     Antiseptic Skin Clnsr,chlorhe, 4 % liquid APPLY 1 APPLICATION(S) EVERY DAY BY TOPICAL ROUTE FOR 10 DAYS.  HumaLOG Mix 75-25 KwikPen flexpen     prazosin (MINIPRESS) 5 mg capsule TAKE 1 CAP BY MOUTH NIGHTLY.  montelukast (SINGULAIR) 10 mg tablet as needed.  om 3/E/linol/ala/oleic/gla/lip (OMEGA 3-6-9 PO) Take  by mouth.     olopatadine (PAZEO) 0.7 % drop Pazeo 0.7 % eye drops   INSTILL 1 DROP ONCE DAILY AS NEEDED FOR ITCHING OR WATERINESS    ergocalciferol (ERGOCALCIFEROL) 50,000 unit capsule Take 1 capsule every week by oral route.  naloxone (NARCAN) 4 mg/actuation nasal spray Narcan 4 mg/actuation nasal spray    cromolyn (OPTICROM) 4 % ophthalmic solution cromolyn 4 % eye drops   INSTILL 1 DROP INTO AFFECTED EYE(S) BY OPHTHALMIC ROUTE 4 TIMES PER DAY    alcohol swabs (ALCOHOL PREP PADS) padm Alcohol Prep Pads   use as directed prior to insulin injection 1-2 times daily    Insulin Needles, Disposable, (BD ULTRA-FINE SHORT PEN NEEDLE) 31 gauge x 5/16\" ndle BD Ultra-Fine Short Pen Needle 31 gauge x 5/16\"   USE TO TEST TWICE DAILY    pen needle, diabetic (COMFORT EZ PEN NEEDLES) 32 gauge x 3/16\" ndle Comfort EZ Pen Needles 32 gauge x 3/16\"    oxyCODONE-acetaminophen (PERCOCET 10)  mg per tablet oxycodone-acetaminophen 7.5 mg-500 mg tablet    glucose blood VI test strips (ONETOUCH ULTRA BLUE TEST STRIP) strip OneTouch Ultra Blue Test Strip   TO CHECK BLOOD SUGAR TWICE A DAY    citalopram (CELEXA) 40 mg tablet citalopram 40 mg tablet    diclofenac (VOLTAREN) 1 % gel Voltaren 1 % topical gel    Back Brace misc 1 Each by Does Not Apply route daily. Please order  back brace for help with patient stability and balance  ( [employer] medical supply )    ONETOUCH ULTRA TEST strip USED TO CHECK BLOOD SUGAR TWICE A DAY- DX: E11.9    rivaroxaban (XARELTO) 15 mg tab tablet Take 10 mg by mouth daily (with breakfast).  albuterol (PROVENTIL HFA, VENTOLIN HFA, PROAIR HFA) 90 mcg/actuation inhaler 2 Puffs every four (4) hours as needed.  ammonium lactate (LAC-HYDRIN) 12 % lotion Apply  to affected area as needed (feet).  Azelastine (ASTEPRO) 0.15 % (205.5 mcg) nasal spray 1 [place] by Both Nostrils route.  polyethylene glycol (MIRALAX) 17 gram/dose powder 17 g as needed.     fluticasone (FLONASE) 50 mcg/actuation nasal spray Two squirts both nostrils at bedtime    cyclobenzaprine (FLEXERIL) 5 mg tablet Take 5 mg by mouth three (3) times daily as needed for Muscle Spasm(s). Takes 1 to 2 tablets    topiramate (TOPAMAX) 200 mg tablet Take 200 mg by mouth nightly. 2 tabs at night prn  Indications: migraine prevention    finasteride (PROSCAR) 5 mg tablet Take 1 Tablet by mouth daily.  doxycycline (MONODOX) 100 mg capsule Take 100 mg by mouth every twelve (12) hours.  losartan (COZAAR) 100 mg tablet Take 1 Tab by mouth daily.  hydroCHLOROthiazide (HYDRODIURIL) 25 mg tablet Take 1 Tab by mouth daily. (Patient not taking: Reported on 9/23/2021)    fexofenadine (ALLEGRA) 180 mg tablet TAKE 1 TABLET BY MOUTH EVERY DAY IN THE MORNING    levocetirizine (XYZAL) 5 mg tablet TAKE 1 TABLET BY MOUTH EVERY DAY    escitalopram oxalate (LEXAPRO) 20 mg tablet as needed.  benztropine (COGENTIN) 0.5 mg tablet benztropine 0.5 mg tablet    amantadine HCl (SYMMETREL) 100 mg capsule as needed.  gabapentin (NEURONTIN) 300 mg capsule Take 1 Cap by mouth three (3) times daily. For nerve pain. 1 cap qhs for 1 wk, then increase to 1 cap BID for 1 wk, then increase to 1 cap TID     No current facility-administered medications for this visit. Past Surgical History:   Procedure Laterality Date    HX CARPAL TUNNEL RELEASE Left     HX HEENT  2017    sinus surgery    HX OTHER SURGICAL  2008    sinus    HX OTHER SURGICAL  2009    stress test    HX OTHER SURGICAL  03/31/2016    EPIDURAL INJECTIONS    HX SHOULDER ARTHROSCOPY Left     HX TURP  05/20/2016    Dr. Susana Carmichael HX TURP  07/22/2016    RAYA Carmichael Dr. Liliana Herbert HX UROLOGICAL  09/12/2017    [address], Cysto, direct visualized internal urethrotomy, Dr. Lynn Glynn HX UROLOGICAL  02/18/2020    Cystourethroscopy/Urethroplasty - [facility] - Dr. Barbara Bernal       Diagnostic Studies:  7/17 Nuc Stress  IMPRESSION:     1. No evidence of ischemia. 2.  Ejection fraction calculated at 33%    7/17 ECHO  1. Normal LV size and systolic function. EF ~ 55%  2. Grade I diastolic dysfunction.   3. No significant valvular pathology. Visit Vitals  /74 (BP 1 Location: Left upper arm, BP Patient Position: Sitting, BP Cuff Size: Adult)   Pulse 72   Ht 5' 9\" (1.753 m)   Wt 130.6 kg (288 lb)   SpO2 97%   BMI 42.53 kg/m²       Mr. Varsha Corrales has a reminder for a \"due or due soon\" health maintenance. I have asked that he contact his primary care provider for follow-up on this health maintenance. Physical Exam  Constitutional:       General: He is not in acute distress. Appearance: He is well-developed. He is obese. HENT:      Head: Normocephalic and atraumatic. Mouth/Throat:      Dentition: Normal dentition. Eyes:      General: No scleral icterus. Right eye: No discharge. Left eye: No discharge. Neck:      Thyroid: No thyromegaly. Vascular: No carotid bruit or JVD. Cardiovascular:      Rate and Rhythm: Normal rate and regular rhythm. Pulses: Intact distal pulses. Heart sounds: Normal heart sounds, S1 normal and S2 normal. No murmur heard. No friction rub. No gallop. Pulmonary:      Effort: Pulmonary effort is normal.      Breath sounds: Normal breath sounds. No wheezing or rales. Abdominal:      Palpations: Abdomen is soft. There is no mass. Tenderness: There is no abdominal tenderness. Musculoskeletal:      Cervical back: Neck supple. Right lower leg: Edema (1) present. Left lower leg: Edema (1) present. Lymphadenopathy:      Cervical:      Right cervical: No superficial cervical adenopathy. Left cervical: No superficial cervical adenopathy. Skin:     General: Skin is warm and dry. Findings: No rash. Neurological:      Mental Status: He is alert and oriented to person, place, and time.    Psychiatric:         Behavior: Behavior normal.         ASSESSMENT and PLAN  HLD : Lipid Complete PanelResulted: 3/1/2021 4:58 PM  [facility]  Component Name Value Ref Range   Cholesterol 124 110 - 200 mg/dL   Triglyceride 137 40 - 149 mg/dL   HDL 32 (L) >=40 mg/dL   Cholesterol/HDL 3.9 0.0 - 5.0    Non-HDL Cholesterol 92 <130 mg/dL   LDL CALCULATION 64 50 - 99 mg/dL   VLDL CALCULATION 27 8 - 30 mg/dL   LDL/HDL Ratio 2.0       Discussed the patient's above normal BMI with him. I have recommended the following interventions: dietary management education, guidance, and counseling . The BMI follow up plan is as follows: BMI is out of normal parameters and plan is as follows: I have counseled this patient on diet and exercise regimens    Patient advised to stop smoking to reduce future cardiovascular events. Has chronic diastolic CHF. Would avoid diuretics due to history of dizziness. Blood pressure seems to be controlled. 2/2020  Compensated diastolic CHF  Blood pressure controlled with current medications. May proceed with surgery with low cardiac risk. Encouraged smoking cessation to reduce the risk of future cardiovascular events. 9/20 CHF is compensated. NYHA class II. CAD stable. Blood pressure is controlled. Continue same medications. Diet weight and exercise discussed. Smoking cessation reinforced. 3/11/2021 CAD stable. CHF compensated NYHA class II  Continues to smoke discussed various ways encouraged to educate himself and read more about smoking as it and its effects. Blood pressure is controlled. Continue same medications. Lipids are controlled well. Continue statins. Recommended weight loss with which she agrees. Printed Mediterranean diet guidelines. Diagnoses and all orders for this visit:    1. Essential hypertension  -     AMB POC EKG ROUTINE W/ 12 LEADS, INTER & REP  -     hydroCHLOROthiazide (HYDRODIURIL) 25 mg tablet; Take 1 Tablet by mouth daily.  -     ECHO ADULT COMPLETE; Future  -     ECHO ADULT COMPLETE; Future    2. Atherosclerosis of native coronary artery of native heart without angina pectoris    3. Mixed hyperlipidemia    4.  Chronic diastolic congestive heart failure (HCC)  -     ECHO ADULT COMPLETE; Future  -     ECHO ADULT COMPLETE; Future    5. Tobacco dependence    6. Severe obesity (BMI >= 40) (HCC)    7. Preop cardiovascular exam  -     ECHO ADULT COMPLETE; Future  -     ECHO ADULT COMPLETE; Future        Pertinent laboratory and test data reviewed and discussed with patient. See patient instructions also for other medical advice given    Medications Discontinued During This Encounter   Medication Reason    cetirizine (ZYRTEC) 10 mg tablet Not A Current Medication    diclofenac (FLECTOR) 1.3 % pt12 Not A Current Medication    LINZESS 145 mcg cap capsule Therapy Completed    lurasidone (LATUDA) 20 mg tab tablet DISCONTINUED BY ANOTHER CLINICIAN    sildenafiL, pulmonary hypertension, (REVATIO) 20 mg tablet Not A Current Medication    tadalafiL (CIALIS) 5 mg tablet Not A Current Medication    hydroCHLOROthiazide (HYDRODIURIL) 25 mg tablet REORDER    losartan (COZAAR) 100 mg tablet Alternate Therapy       Follow-up and Dispositions    · Return in about 6 months (around 3/23/2022), or if symptoms worsen or fail to improve, for post surgery, clear after echo and send me a message for that today for the future date. 9/23/2021 CHF is clinically compensated though he has edema and he has not been taking. Represcribed HCTZ. CAD if any is stable as he had mildly abnormal stress test many years ago. EKG is stable. Will clear for surgery if echo does not show any significant new abnormalities. Lipids are controlled. Continue statins. He is trying to walk regularly and wants to lose weight and stop smoking. Will consider Chantix or Wellbutrin postoperatively. Mediterranean diet guidelines printed.

## 2021-10-06 ENCOUNTER — HOSPITAL ENCOUNTER (OUTPATIENT)
Dept: NON INVASIVE DIAGNOSTICS | Age: 61
Discharge: HOME | End: 2021-10-06
Attending: INTERNAL MEDICINE
Payer: COMMERCIAL

## 2021-10-06 VITALS
WEIGHT: 288 LBS | DIASTOLIC BLOOD PRESSURE: 74 MMHG | BODY MASS INDEX: 42.65 KG/M2 | HEIGHT: 69 IN | SYSTOLIC BLOOD PRESSURE: 132 MMHG

## 2021-10-06 DIAGNOSIS — I50.32 CHRONIC DIASTOLIC CONGESTIVE HEART FAILURE (HCC): ICD-10-CM

## 2021-10-06 DIAGNOSIS — I10 ESSENTIAL HYPERTENSION: ICD-10-CM

## 2021-10-06 DIAGNOSIS — Z01.810 PREOP CARDIOVASCULAR EXAM: ICD-10-CM

## 2021-10-06 LAB
ECHO AO ROOT DIAM: 3.53 CM
ECHO LA AREA 4C: 21.76 CM2
ECHO LA VOL 2C: 80.39 ML (ref 18–58)
ECHO LA VOL 4C: 60.62 ML (ref 18–58)
ECHO LA VOL BP: 76.77 ML (ref 18–58)
ECHO LA VOL/BSA BIPLANE: 31.85 ML/M2 (ref 16–28)
ECHO LA VOLUME INDEX A2C: 33.36 ML/M2 (ref 16–28)
ECHO LA VOLUME INDEX A4C: 25.15 ML/M2 (ref 16–28)
ECHO LV INTERNAL DIMENSION DIASTOLIC: 5.3 CM (ref 4.2–5.9)
ECHO LV INTERNAL DIMENSION SYSTOLIC: 3.79 CM
ECHO LV IVSD: 1.27 CM (ref 0.6–1)
ECHO LV MASS 2D: 277.7 G (ref 88–224)
ECHO LV MASS INDEX 2D: 115.2 G/M2 (ref 49–115)
ECHO LV POSTERIOR WALL DIASTOLIC: 1.27 CM (ref 0.6–1)
ECHO LVOT DIAM: 2.03 CM
ECHO LVOT PEAK GRADIENT: 2.56 MMHG
ECHO LVOT PEAK VELOCITY: 79.95 CM/S
ECHO LVOT SV: 54.1 ML
ECHO LVOT VTI: 16.75 CM
ECHO MV A VELOCITY: 86.24 CM/S
ECHO MV E DECELERATION TIME (DT): 158.38 MS
ECHO MV E VELOCITY: 72.4 CM/S
ECHO MV E/A RATIO: 0.84
ECHO TV REGURGITANT MAX VELOCITY: 181.02 CM/S
ECHO TV REGURGITANT PEAK GRADIENT: 13.11 MMHG
LVOT MG: 1.67 MMHG

## 2021-10-06 PROCEDURE — C8929 TTE W OR WO FOL WCON,DOPPLER: HCPCS

## 2021-10-06 PROCEDURE — 74011250636 HC RX REV CODE- 250/636: Performed by: INTERNAL MEDICINE

## 2021-10-06 RX ADMIN — PERFLUTREN 2 ML: 6.52 INJECTION, SUSPENSION INTRAVENOUS at 12:22

## 2021-11-02 DIAGNOSIS — I10 ESSENTIAL HYPERTENSION: ICD-10-CM

## 2021-11-02 RX ORDER — POTASSIUM CHLORIDE 1500 MG/1
20 TABLET, FILM COATED, EXTENDED RELEASE ORAL DAILY
Qty: 90 TABLET | Refills: 1 | Status: SHIPPED | OUTPATIENT
Start: 2021-11-02 | End: 2021-11-17 | Stop reason: SDUPTHER

## 2021-11-17 DIAGNOSIS — I10 ESSENTIAL HYPERTENSION: ICD-10-CM

## 2021-11-17 RX ORDER — POTASSIUM CHLORIDE 1500 MG/1
20 TABLET, FILM COATED, EXTENDED RELEASE ORAL DAILY
Qty: 90 TABLET | Refills: 1 | Status: SHIPPED | OUTPATIENT
Start: 2021-11-17 | End: 2022-03-10 | Stop reason: SDUPTHER

## 2022-03-10 ENCOUNTER — OFFICE VISIT (OUTPATIENT)
Dept: CARDIOLOGY CLINIC | Age: 62
End: 2022-03-10
Payer: COMMERCIAL

## 2022-03-10 VITALS
HEIGHT: 69 IN | SYSTOLIC BLOOD PRESSURE: 139 MMHG | WEIGHT: 290 LBS | DIASTOLIC BLOOD PRESSURE: 78 MMHG | HEART RATE: 92 BPM | BODY MASS INDEX: 42.95 KG/M2

## 2022-03-10 DIAGNOSIS — F17.200 TOBACCO DEPENDENCE: ICD-10-CM

## 2022-03-10 DIAGNOSIS — E78.2 MIXED HYPERLIPIDEMIA: ICD-10-CM

## 2022-03-10 DIAGNOSIS — I50.32 CHRONIC DIASTOLIC CONGESTIVE HEART FAILURE (HCC): Primary | ICD-10-CM

## 2022-03-10 DIAGNOSIS — I10 ESSENTIAL HYPERTENSION: ICD-10-CM

## 2022-03-10 DIAGNOSIS — E66.01 SEVERE OBESITY (BMI >= 40) (HCC): ICD-10-CM

## 2022-03-10 PROCEDURE — G8417 CALC BMI ABV UP PARAM F/U: HCPCS | Performed by: INTERNAL MEDICINE

## 2022-03-10 PROCEDURE — G8754 DIAS BP LESS 90: HCPCS | Performed by: INTERNAL MEDICINE

## 2022-03-10 PROCEDURE — G8752 SYS BP LESS 140: HCPCS | Performed by: INTERNAL MEDICINE

## 2022-03-10 PROCEDURE — 3017F COLORECTAL CA SCREEN DOC REV: CPT | Performed by: INTERNAL MEDICINE

## 2022-03-10 PROCEDURE — 99214 OFFICE O/P EST MOD 30 MIN: CPT | Performed by: INTERNAL MEDICINE

## 2022-03-10 PROCEDURE — G8427 DOCREV CUR MEDS BY ELIG CLIN: HCPCS | Performed by: INTERNAL MEDICINE

## 2022-03-10 PROCEDURE — G8432 DEP SCR NOT DOC, RNG: HCPCS | Performed by: INTERNAL MEDICINE

## 2022-03-10 RX ORDER — PRAVASTATIN SODIUM 40 MG/1
40 TABLET ORAL
Qty: 90 TABLET | Refills: 1 | Status: SHIPPED | OUTPATIENT
Start: 2022-03-10

## 2022-03-10 RX ORDER — SILDENAFIL CITRATE 20 MG/1
TABLET ORAL
Qty: 90 TABLET | Refills: 1 | Status: CANCELLED | OUTPATIENT
Start: 2022-03-10

## 2022-03-10 RX ORDER — PRAZOSIN HYDROCHLORIDE 5 MG/1
5 CAPSULE ORAL
Qty: 90 CAPSULE | Refills: 3 | Status: SHIPPED | OUTPATIENT
Start: 2022-03-10 | End: 2022-09-22 | Stop reason: SDUPTHER

## 2022-03-10 RX ORDER — AMLODIPINE BESYLATE 10 MG/1
10 TABLET ORAL DAILY
Qty: 90 TABLET | Refills: 3 | Status: SHIPPED | OUTPATIENT
Start: 2022-03-10 | End: 2022-09-22 | Stop reason: SDUPTHER

## 2022-03-10 RX ORDER — FUROSEMIDE 20 MG/1
20 TABLET ORAL
Qty: 90 TABLET | Refills: 1 | Status: SHIPPED | OUTPATIENT
Start: 2022-03-10 | End: 2022-09-22 | Stop reason: SDUPTHER

## 2022-03-10 RX ORDER — POTASSIUM CHLORIDE 1500 MG/1
20 TABLET, FILM COATED, EXTENDED RELEASE ORAL DAILY
Qty: 90 TABLET | Refills: 1 | Status: SHIPPED | OUTPATIENT
Start: 2022-03-10 | End: 2022-05-25

## 2022-03-10 RX ORDER — HYDROCHLOROTHIAZIDE 25 MG/1
25 TABLET ORAL DAILY
Qty: 90 TABLET | Refills: 3 | Status: SHIPPED | OUTPATIENT
Start: 2022-03-10

## 2022-03-10 NOTE — PATIENT INSTRUCTIONS
Medications Discontinued During This Encounter   Medication Reason    doxycycline (MONODOX) 100 mg capsule Therapy Completed    prazosin (MINIPRESS) 5 mg capsule REORDER    amLODIPine (NORVASC) 10 mg tablet REORDER    pravastatin (PRAVACHOL) 40 mg tablet REORDER    furosemide (LASIX) 20 mg tablet REORDER    hydroCHLOROthiazide (HYDRODIURIL) 25 mg tablet REORDER    potassium chloride SR (K-TAB) 20 mEq tablet REORDER    sildenafiL, pulmonary hypertension, (REVATIO) 20 mg tablet Alternate Therapy        Learning About the Mediterranean Diet  What is the Mediterranean diet? The Mediterranean diet is a style of eating rather than a diet plan. It features foods eaten in [place], [place], [place], and other countries along the [facility]. It emphasizes eating foods like fish, fruits, vegetables, beans, high-fiber breads and whole grains, nuts, and olive oil. This style of eating includes limited red meat, cheese, and sweets. Why choose the Mediterranean diet? A Mediterranean-style diet may improve heart health. It contains more fat than other heart-healthy diets. But the fats are mainly from nuts, unsaturated oils (such as fish oils and olive oil), and certain nut or seed oils (such as canola, soybean, or flaxseed oil). These fats may help protect the heart and blood vessels. How can you get started on the Mediterranean diet? Here are some things you can do to switch to a more Mediterranean way of eating. What to eat  · Eat a variety of fruits and vegetables each day, such as grapes, blueberries, tomatoes, broccoli, peppers, figs, olives, spinach, eggplant, beans, lentils, and chickpeas. · Eat a variety of whole-grain foods each day, such as oats, brown rice, and whole wheat bread, pasta, and couscous. · Eat fish at least 2 times a week. Try tuna, salmon, mackerel, lake trout, herring, or sardines.   · Eat moderate amounts of low-fat dairy products, such as milk, cheese, or yogurt. · Eat moderate amounts of poultry and eggs. · Choose healthy (unsaturated) fats, such as nuts, olive oil, and certain nut or seed oils like canola, soybean, and flaxseed. · Limit unhealthy (saturated) fats, such as butter, palm oil, and coconut oil. And limit fats found in animal products, such as meat and dairy products made with whole milk. Try to eat red meat only a few times a month in very small amounts. · Limit sweets and desserts to only a few times a week. This includes sugar-sweetened drinks like soda. The Mediterranean diet may also include red wine with your meal--1 glass each day for women and up to 2 glasses a day for men. Tips for eating at home  · Use herbs, spices, garlic, lemon zest, and citrus juice instead of salt to add flavor to foods. · Add avocado slices to your sandwich instead of lam. · Have fish for lunch or dinner instead of red meat. Brush the fish with olive oil, and broil or grill it. · Sprinkle your salad with seeds or nuts instead of cheese. · Cook with olive or canola oil instead of butter or oils that are high in saturated fat. · Switch from 2% milk or whole milk to 1% or fat-free milk. · Dip raw vegetables in a vinaigrette dressing or hummus instead of dips made from mayonnaise or sour cream.  · Have a piece of fruit for dessert instead of a piece of cake. Try baked apples, or have some dried fruit. Tips for eating out  · Try broiled, grilled, baked, or poached fish instead of having it fried or breaded. · Ask your  to have your meals prepared with olive oil instead of butter. · Order dishes made with marinara sauce or sauces made from olive oil. Avoid sauces made from cream or mayonnaise. · Choose whole-grain breads, whole wheat pasta and pizza crust, brown rice, beans, and lentils. · Cut back on butter or margarine on bread. Instead, you can dip your bread in a small amount of olive oil.   · Ask for a side salad or grilled vegetables instead of french fries or chips. Where can you learn more? Go to http://www.[employer].com/  Enter O407 in the search box to learn more about \"Learning About the Mediterranean Diet. \"  Current as of: September 8, 2021               Content Version: 13.2  © 0338-2560 [employer]. Care instructions adapted under license by [employer] (which disclaims liability or warranty for this information). If you have questions about a medical condition or this instruction, always ask your healthcare professional. [address] any warranty or liability for your use of this information.

## 2022-03-10 NOTE — PROGRESS NOTES
HISTORY OF PRESENT ILLNESS  Minoo Vivas is a 57 y.o. male. Follow-up of CAD, hypertension, chronic diastolic CHF, hyperlipidemia, morbid obesity and tobacco abuse    9/21 seen for preoperative clearance in addition to regular follow-up. Surgeries fistulotomy or excision of thrombosed hemorrhoids with anal fistula    Follow-up  Pertinent negatives include no chest pain, no headaches and no shortness of breath. Review of Systems   Constitutional: Negative for chills, diaphoresis, fever, malaise/fatigue and weight loss. HENT: Negative for nosebleeds. Eyes: Negative for discharge. Respiratory: Negative for cough, shortness of breath and wheezing. Cardiovascular: Negative for chest pain, palpitations, orthopnea, claudication, leg swelling and PND. Gastrointestinal: Negative for diarrhea, nausea and vomiting. Genitourinary: Negative for dysuria and hematuria. Musculoskeletal: Positive for back pain, joint pain and neck pain. Skin: Negative for rash. Neurological: Negative for dizziness, seizures, loss of consciousness and headaches. Endo/Heme/Allergies: Negative for polydipsia. Does not bruise/bleed easily. Psychiatric/Behavioral: Negative for depression and substance abuse. The patient does not have insomnia.       Allergies   Allergen Reactions    Other Food Hives     \"double-crabs\"  Does fine with all other seafood    Seafood Hives     \"deviled-crabs\"  Does fine with all other seafood    Aspirin Hives     Other reaction(s): facial swelling     Grass Pollen Other (comments)     Eye swollen    Simvastatin Other (comments)     Pt states that he is not allergic    Tomato Hives       Past Medical History:   Diagnosis Date    Adverse effect of anesthesia     sx cx due to blood pressure drop    Allergic rhinitis     Asthma     Atherosclerosis of native coronary artery with angina pectoris ([facility] [address] 5/14/2015    atypical angina, mild stress abnormality may be artifact nl EF by echo     Benign non-nodular prostatic hyperplasia with lower urinary tract symptoms     BPH with obstruction/lower urinary tract symptoms     Bulbous urethral stricture     Chronic epididymitis     Chronic pain     shoulder, neck, knee    Coronary artery disease involving native coronary artery of native heart with angina pectoris ([address] 5/13/2016    chr atypical CP for few seconds only     Diabetes mellitus ([facility] [address]     DM (diabetes mellitus) ([address]     DVT (deep venous thrombosis) ([address] 2013    left leg    Emphysema     Epididymal cyst     Erectile dysfunction of organic origin     Essential hypertension     GERD (gastroesophageal reflux disease)     High cholesterol     Hypertension     Impotence     Incomplete bladder emptying     Incontinence     Intermittent urinary stream     Kidney stone     Lower urinary tract symptoms (LUTS)     Microhematuria     Morbid obesity ([address] 4/1/2015    Multiple trauma     LEFT SHOULDER, LEFT WRIST, NECK, AND BACK    Other and unspecified hyperlipidemia 4/1/2015    Paranoid schizophrenia ([facility] [address]     Pre-op testing     Preop cardiovascular exam 11/13/2015    wrist surgery; no significant CP; abnl stress ? artifact cleared with mild risk periop     Sciatica     Severe obesity (BMI >= 40) ([facility]) 11/13/2015    Sinusitis     Spermatocele of epididymis     Type II or unspecified type diabetes mellitus without mention of complication, not stated as uncontrolled 4/1/2015    Urethral stricture     Urinary urgency     Urine leukocytes     Weak urinary stream        Family History   Problem Relation Age of Onset    Stroke Mother 79    Heart Disease Mother     Diabetes Father     Cancer Brother     Heart Attack Neg Hx        Social History     Tobacco Use    Smoking status: Current Every Day Smoker     Packs/day: 0.25     Years: 32.00     Pack years: 8.00     Types: Cigarettes     Start date: 8/1/1976    Smokeless tobacco: Former User    Tobacco comment: per patient stated 1 or 2 a day ready to stop   Substance Use Topics    Alcohol use: No     Alcohol/week: 0.0 standard drinks    Drug use: No        Current Outpatient Medications   Medication Sig    finasteride (PROSCAR) 5 mg tablet Take 1 Tablet by mouth daily.  tadalafiL (CIALIS) 5 mg tablet Take 1 Tablet by mouth daily.  sildenafiL, pulmonary hypertension, (REVATIO) 20 mg tablet TAKE ONE TO FIVE TABLETS BY MOUTH ONE HOUR PRIOR TO SEXUAL ACTIVITY    tamsulosin (FLOMAX) 0.4 mg capsule Take 2 capsules by mouth before bedtime  Indications: enlarged prostate with urination problem    potassium chloride SR (K-TAB) 20 mEq tablet Take 1 Tablet by mouth daily.  valsartan (DIOVAN) 320 mg tablet     hydroCHLOROthiazide (HYDRODIURIL) 25 mg tablet Take 1 Tablet by mouth daily.  furosemide (LASIX) 20 mg tablet Take 1 Tablet by mouth daily as needed (edema).  Symbicort 80-4.5 mcg/actuation HFAA     triamcinolone acetonide (KENALOG) 0.1 % ointment     hydrOXYzine HCL (ATARAX) 25 mg tablet Take 25 mg by mouth every four (4) hours as needed.  hydrocortisone (HYTONE) 2.5 % ointment     amLODIPine (NORVASC) 10 mg tablet Take 1 Tab by mouth daily.  pravastatin (PRAVACHOL) 40 mg tablet Take 1 Tab by mouth nightly.  budesonide (PULMICORT) 1 mg/2 mL nbsp     fexofenadine (ALLEGRA) 180 mg tablet TAKE 1 TABLET BY MOUTH EVERY DAY IN THE MORNING    HumaLOG Mix 75-25 KwikPen flexpen     prazosin (MINIPRESS) 5 mg capsule TAKE 1 CAP BY MOUTH NIGHTLY.  levocetirizine (XYZAL) 5 mg tablet TAKE 1 TABLET BY MOUTH EVERY DAY    montelukast (SINGULAIR) 10 mg tablet as needed.  escitalopram oxalate (LEXAPRO) 20 mg tablet as needed.  om 3/E/linol/ala/oleic/gla/lip (OMEGA 3-6-9 PO) Take  by mouth.  olopatadine (PAZEO) 0.7 % drop Pazeo 0.7 % eye drops   INSTILL 1 DROP ONCE DAILY AS NEEDED FOR ITCHING OR WATERINESS    ergocalciferol (ERGOCALCIFEROL) 50,000 unit capsule Take 1 capsule every week by oral route.  naloxone (NARCAN) 4 mg/actuation nasal spray Narcan 4 mg/actuation nasal spray    cromolyn (OPTICROM) 4 % ophthalmic solution cromolyn 4 % eye drops   INSTILL 1 DROP INTO AFFECTED EYE(S) BY OPHTHALMIC ROUTE 4 TIMES PER DAY    alcohol swabs (ALCOHOL PREP PADS) padm Alcohol Prep Pads   use as directed prior to insulin injection 1-2 times daily    Insulin Needles, Disposable, (BD ULTRA-FINE SHORT PEN NEEDLE) 31 gauge x 5/16\" ndle BD Ultra-Fine Short Pen Needle 31 gauge x 5/16\"   USE TO TEST TWICE DAILY    pen needle, diabetic (COMFORT EZ PEN NEEDLES) 32 gauge x 3/16\" ndle Comfort EZ Pen Needles 32 gauge x 3/16\"    oxyCODONE-acetaminophen (PERCOCET 10)  mg per tablet oxycodone-acetaminophen 7.5 mg-500 mg tablet    glucose blood VI test strips (ONETOUCH ULTRA BLUE TEST STRIP) strip OneTouch Ultra Blue Test Strip   TO CHECK BLOOD SUGAR TWICE A DAY    benztropine (COGENTIN) 0.5 mg tablet benztropine 0.5 mg tablet    citalopram (CELEXA) 40 mg tablet citalopram 40 mg tablet    diclofenac (VOLTAREN) 1 % gel Voltaren 1 % topical gel    Back Brace misc 1 Each by Does Not Apply route daily. Please order  back brace for help with patient stability and balance  ( [employer] )    amantadine HCl (SYMMETREL) 100 mg capsule as needed.  ONETOUCH ULTRA TEST strip USED TO CHECK BLOOD SUGAR TWICE A DAY- DX: E11.9    rivaroxaban (XARELTO) 15 mg tab tablet Take 10 mg by mouth daily (with breakfast).  gabapentin (NEURONTIN) 300 mg capsule Take 1 Cap by mouth three (3) times daily. For nerve pain. 1 cap qhs for 1 wk, then increase to 1 cap BID for 1 wk, then increase to 1 cap TID    albuterol (PROVENTIL HFA, VENTOLIN HFA, PROAIR HFA) 90 mcg/actuation inhaler 2 Puffs every four (4) hours as needed.  ammonium lactate (LAC-HYDRIN) 12 % lotion Apply  to affected area as needed (feet).  Azelastine (ASTEPRO) 0.15 % (205.5 mcg) nasal spray 1 Divide by Both Nostrils route.     polyethylene glycol (MIRALAX) 17 gram/dose powder 17 g as needed.  fluticasone (FLONASE) 50 mcg/actuation nasal spray Two squirts both nostrils at bedtime    cyclobenzaprine (FLEXERIL) 5 mg tablet Take 5 mg by mouth three (3) times daily as needed for Muscle Spasm(s). Takes 1 to 2 tablets    topiramate (TOPAMAX) 200 mg tablet Take 200 mg by mouth nightly. 2 tabs at night prn  Indications: migraine prevention     No current facility-administered medications for this visit. Past Surgical History:   Procedure Laterality Date    HX CARPAL TUNNEL RELEASE Left     HX HEENT  2017    sinus surgery    HX OTHER SURGICAL  2008    sinus    HX OTHER SURGICAL  2009    stress test    HX OTHER SURGICAL  03/31/2016    EPIDURAL INJECTIONS    HX SHOULDER ARTHROSCOPY Left     HX TURP  05/20/2016    [facility], Dr. Ashanti Esteban HX TURP  07/22/2016    [facility] TUR, Dr. Ashanti Esteban HX UROLOGICAL  09/12/2017    [id number], Cysto, direct visualized internal urethrotomy, Dr. Hernando Barrios HX UROLOGICAL  02/18/2020    Cystourethroscopy/Urethroplasty - [facility] - Dr. Randal Quintanilla       Diagnostic Studies:  7/17 Nuc Stress  IMPRESSION:     1. No evidence of ischemia. 2.  Ejection fraction calculated at 33%    7/17 ECHO  1. Normal LV size and systolic function. EF ~ 55%  2. Grade I diastolic dysfunction. 3. No significant valvular pathology. 10/06/21    ECHO ADULT COMPLETE 10/06/2021 10/6/2021    Interpretation Summary  · Contrast used: DEFINITY. · Image quality for this study was technically difficult. · LV: Calculated LVEF is 50%. Visually measured ejection fraction. Normal cavity size. Mild concentric hypertrophy. Low normal systolic function. Wall motion: normal. Age-appropriate left ventricular diastolic function. · MV: Mitral valve non-specific thickening.     Signed by: Eliud Burnett MD on 10/6/2021  1:42 PM    Visit Vitals  /78 (BP 1 Location: Left upper arm, BP Patient Position: Sitting, BP Cuff Size: Large adult)   Pulse 92   Ht 5' 9.02\" (1.753 m)   Wt 131.5 kg (290 lb)   BMI 42.80 kg/m²       Mr. Kavon Payne has a reminder for a \"due or due soon\" health maintenance. I have asked that he contact his primary care provider for follow-up on this health maintenance. Physical Exam  Constitutional:       General: He is not in acute distress. Appearance: He is well-developed. He is obese. HENT:      Head: Normocephalic and atraumatic. Mouth/Throat:      Dentition: Normal dentition. Eyes:      General: No scleral icterus. Right eye: No discharge. Left eye: No discharge. Neck:      Thyroid: No thyromegaly. Vascular: No carotid bruit or JVD. Cardiovascular:      Rate and Rhythm: Normal rate and regular rhythm. Pulses: Intact distal pulses. Heart sounds: Normal heart sounds, S1 normal and S2 normal. No murmur heard. No friction rub. No gallop. Pulmonary:      Effort: Pulmonary effort is normal.      Breath sounds: Normal breath sounds. No wheezing or rales. Abdominal:      Palpations: Abdomen is soft. There is no mass. Tenderness: There is no abdominal tenderness. Musculoskeletal:      Cervical back: Neck supple. Right lower leg: Edema (1) present. Left lower leg: Edema (1) present. Lymphadenopathy:      Cervical:      Right cervical: No superficial cervical adenopathy. Left cervical: No superficial cervical adenopathy. Skin:     General: Skin is warm and dry. Findings: No rash. Neurological:      Mental Status: He is alert and oriented to person, place, and time.    Psychiatric:         Behavior: Behavior normal.         ASSESSMENT and PLAN  HLD : Lipid Complete PanelResulted: 3/1/2021 4:58 PM  [address]  Component Name Value Ref Range   Cholesterol 124 110 - 200 mg/dL   Triglyceride 137 40 - 149 mg/dL   HDL 32 (L) >=40 mg/dL   Cholesterol/HDL 3.9 0.0 - 5.0    Non-HDL Cholesterol 92 <130 mg/dL   LDL CALCULATION 64 50 - 99 mg/dL   VLDL CALCULATION 27 8 - 30 mg/dL LDL/HDL Ratio 2.0       Discussed the patient's above normal BMI with him. I have recommended the following interventions: dietary management education, guidance, and counseling . The BMI follow up plan is as follows: BMI is out of normal parameters and plan is as follows: I have counseled this patient on diet and exercise regimens    Patient advised to stop smoking to reduce future cardiovascular events. Has chronic diastolic CHF. Would avoid diuretics due to history of dizziness. Blood pressure seems to be controlled. 2/2020  Compensated diastolic CHF  Blood pressure controlled with current medications. May proceed with surgery with low cardiac risk. Encouraged smoking cessation to reduce the risk of future cardiovascular events. 9/20 CHF is compensated. NYHA class II. CAD stable. Blood pressure is controlled. Continue same medications. Diet weight and exercise discussed. Smoking cessation reinforced. 3/11/2021 CAD stable. CHF compensated NYHA class II  Continues to smoke discussed various ways encouraged to educate himself and read more about smoking as it and its effects. Blood pressure is controlled. Continue same medications. Lipids are controlled well. Continue statins. Recommended weight loss with which she agrees. Printed Mediterranean diet guidelines. 9/23/2021 CHF is clinically compensated though he has edema and he has not been taking. Represcribed HCTZ. CAD if any is stable as he had mildly abnormal stress test many years ago. EKG is stable. Will clear for surgery if echo does not show any significant new abnormalities. Lipids are controlled. Continue statins. He is trying to walk regularly and wants to lose weight and stop smoking. Will consider Chantix or Wellbutrin postoperatively. Mediterranean diet guidelines printed. Diagnoses and all orders for this visit:    1. Chronic diastolic congestive heart failure ([address]    2.  Essential hypertension  - amLODIPine (NORVASC) 10 mg tablet; Take 1 Tablet by mouth daily. -     hydroCHLOROthiazide (HYDRODIURIL) 25 mg tablet; Take 1 Tablet by mouth daily. -     potassium chloride SR (K-TAB) 20 mEq tablet; Take 1 Tablet by mouth daily. -     furosemide (LASIX) 20 mg tablet; Take 1 Tablet by mouth daily as needed (edema). -     prazosin (MINIPRESS) 5 mg capsule; Take 1 Capsule by mouth nightly. 3. Mixed hyperlipidemia  -     pravastatin (PRAVACHOL) 40 mg tablet; Take 1 Tablet by mouth nightly. 4. Severe obesity (BMI >= 40) (HCC)    5. Tobacco dependence        Pertinent laboratory and test data reviewed and discussed with patient. See patient instructions also for other medical advice given    Medications Discontinued During This Encounter   Medication Reason    doxycycline (MONODOX) 100 mg capsule Therapy Completed    prazosin (MINIPRESS) 5 mg capsule REORDER    amLODIPine (NORVASC) 10 mg tablet REORDER    pravastatin (PRAVACHOL) 40 mg tablet REORDER    furosemide (LASIX) 20 mg tablet REORDER    hydroCHLOROthiazide (HYDRODIURIL) 25 mg tablet REORDER    potassium chloride SR (K-TAB) 20 mEq tablet REORDER    sildenafiL, pulmonary hypertension, (REVATIO) 20 mg tablet Alternate Therapy       Follow-up and Dispositions    · Return in about 6 months (around 9/10/2022), or if symptoms worsen or fail to improve. 3/10/2022 CHF is compensated NYHA class II but he is not exercising due to recent rectal surgery. Statins are refilled. Blood pressure is controlled. Lipids are controlled. Diet weight and exercise discussed. Mediterranean diet guidelines given.

## 2022-03-10 NOTE — PROGRESS NOTES
1. Have you been to the ER, urgent care clinic since your last visit? Hospitalized since your last visit? Yes When: 2/4/22 [facility] Reason for visit: surgery    2. Have you seen or consulted any other health care providers outside of the [address] [place] [address] since your last visit? Include any pap smears or colon screening. Yes [facility]    3. Since your last visit, have you had any of the following symptoms? shortness of breath. 4.  Have you had any blood work, X-rays or cardiac testing? Yes When: 2/2022 Where: [facility]     Requested: NO     In [facility]: YES    5. Where do you normally have your labs drawn? [facility]     6. Do you need any refills today?   All cardiac medications

## 2022-03-18 PROBLEM — E55.9 VITAMIN D DEFICIENCY: Status: ACTIVE | Noted: 2018-10-31

## 2022-03-18 PROBLEM — F39 MOOD DISORDER (HCC): Status: ACTIVE | Noted: 2017-09-06

## 2022-03-18 PROBLEM — M54.2 CERVICALGIA: Status: ACTIVE | Noted: 2017-06-05

## 2022-03-18 PROBLEM — M25.519 SHOULDER PAIN: Status: ACTIVE | Noted: 2018-10-31

## 2022-03-18 PROBLEM — M67.40 GANGLION CYST: Status: ACTIVE | Noted: 2018-10-31

## 2022-03-18 PROBLEM — S06.0XAA BRAIN CONCUSSION: Status: ACTIVE | Noted: 2018-10-31

## 2022-03-18 PROBLEM — K21.9 GASTRIC REFLUX: Status: ACTIVE | Noted: 2018-10-31

## 2022-03-19 PROBLEM — K59.00 CONSTIPATION: Status: ACTIVE | Noted: 2018-10-31

## 2022-03-19 PROBLEM — I50.32 CHRONIC DIASTOLIC CONGESTIVE HEART FAILURE (HCC): Status: ACTIVE | Noted: 2019-05-20

## 2022-03-19 PROBLEM — N17.9 AKI (ACUTE KIDNEY INJURY) (HCC): Status: ACTIVE | Noted: 2020-06-23

## 2022-03-19 PROBLEM — F20.9 SCHIZOPHRENIA (HCC): Status: ACTIVE | Noted: 2018-02-23

## 2022-03-19 PROBLEM — J30.9 ALLERGIC RHINITIS: Status: ACTIVE | Noted: 2018-10-31

## 2022-03-19 PROBLEM — J32.9 CHRONIC RECURRENT SINUSITIS: Status: ACTIVE | Noted: 2017-12-05

## 2022-03-19 PROBLEM — S83.419A TEAR OF MCL (MEDIAL COLLATERAL LIGAMENT) OF KNEE: Status: ACTIVE | Noted: 2017-06-05

## 2022-03-19 PROBLEM — I82.409 RECURRENT DEEP VEIN THROMBOSIS (HCC): Status: ACTIVE | Noted: 2020-05-08

## 2022-03-19 PROBLEM — M25.539 PAIN IN WRIST: Status: ACTIVE | Noted: 2018-10-31

## 2022-03-19 PROBLEM — L85.3 DRY SKIN: Status: ACTIVE | Noted: 2018-10-31

## 2022-03-19 PROBLEM — M15.0 PRIMARY OSTEOARTHRITIS INVOLVING MULTIPLE JOINTS: Status: ACTIVE | Noted: 2017-06-05

## 2022-03-19 PROBLEM — M15.9 PRIMARY OSTEOARTHRITIS INVOLVING MULTIPLE JOINTS: Status: ACTIVE | Noted: 2017-06-05

## 2022-03-19 PROBLEM — Z79.01 CHRONIC ANTICOAGULATION: Status: ACTIVE | Noted: 2020-06-25

## 2022-03-19 PROBLEM — N20.1 LEFT URETERAL STONE: Status: ACTIVE | Noted: 2020-06-23

## 2022-03-19 PROBLEM — I95.9 HYPOTENSION: Status: ACTIVE | Noted: 2017-07-11

## 2022-03-20 PROBLEM — Z86.718 HISTORY OF RECURRENT DEEP VEIN THROMBOSIS: Status: ACTIVE | Noted: 2020-05-08

## 2022-03-20 PROBLEM — E11.40 TYPE 2 DIABETES MELLITUS WITH DIABETIC NEUROPATHY (HCC): Status: ACTIVE | Noted: 2018-01-12

## 2022-05-25 DIAGNOSIS — I10 ESSENTIAL HYPERTENSION: ICD-10-CM

## 2022-05-25 RX ORDER — POTASSIUM CHLORIDE 1500 MG/1
TABLET, FILM COATED, EXTENDED RELEASE ORAL
Qty: 90 TABLET | Refills: 1 | Status: SHIPPED | OUTPATIENT
Start: 2022-05-25

## 2022-09-22 ENCOUNTER — OFFICE VISIT (OUTPATIENT)
Dept: CARDIOLOGY CLINIC | Age: 62
End: 2022-09-22
Payer: COMMERCIAL

## 2022-09-22 VITALS
WEIGHT: 289 LBS | BODY MASS INDEX: 42.8 KG/M2 | HEART RATE: 90 BPM | SYSTOLIC BLOOD PRESSURE: 181 MMHG | DIASTOLIC BLOOD PRESSURE: 88 MMHG | HEIGHT: 69 IN | OXYGEN SATURATION: 97 %

## 2022-09-22 DIAGNOSIS — E66.01 SEVERE OBESITY (BMI >= 40) (HCC): ICD-10-CM

## 2022-09-22 DIAGNOSIS — I50.32 CHRONIC DIASTOLIC CONGESTIVE HEART FAILURE (HCC): Primary | ICD-10-CM

## 2022-09-22 DIAGNOSIS — F17.200 TOBACCO DEPENDENCE: ICD-10-CM

## 2022-09-22 DIAGNOSIS — I10 ESSENTIAL HYPERTENSION: ICD-10-CM

## 2022-09-22 DIAGNOSIS — E78.2 MIXED HYPERLIPIDEMIA: ICD-10-CM

## 2022-09-22 PROCEDURE — G8427 DOCREV CUR MEDS BY ELIG CLIN: HCPCS | Performed by: INTERNAL MEDICINE

## 2022-09-22 PROCEDURE — G8417 CALC BMI ABV UP PARAM F/U: HCPCS | Performed by: INTERNAL MEDICINE

## 2022-09-22 PROCEDURE — G8753 SYS BP > OR = 140: HCPCS | Performed by: INTERNAL MEDICINE

## 2022-09-22 PROCEDURE — G8432 DEP SCR NOT DOC, RNG: HCPCS | Performed by: INTERNAL MEDICINE

## 2022-09-22 PROCEDURE — G8754 DIAS BP LESS 90: HCPCS | Performed by: INTERNAL MEDICINE

## 2022-09-22 PROCEDURE — 99214 OFFICE O/P EST MOD 30 MIN: CPT | Performed by: INTERNAL MEDICINE

## 2022-09-22 PROCEDURE — 3017F COLORECTAL CA SCREEN DOC REV: CPT | Performed by: INTERNAL MEDICINE

## 2022-09-22 RX ORDER — CLONIDINE HYDROCHLORIDE 0.1 MG/1
0.1 TABLET ORAL
Qty: 90 TABLET | Refills: 1 | Status: SHIPPED
Start: 2022-09-22 | End: 2022-10-17 | Stop reason: DRUGHIGH

## 2022-09-22 RX ORDER — AMLODIPINE BESYLATE 10 MG/1
10 TABLET ORAL DAILY
Qty: 90 TABLET | Refills: 3 | Status: SHIPPED | OUTPATIENT
Start: 2022-09-22

## 2022-09-22 RX ORDER — PRAZOSIN HYDROCHLORIDE 5 MG/1
5 CAPSULE ORAL
Qty: 90 CAPSULE | Refills: 3 | Status: SHIPPED | OUTPATIENT
Start: 2022-09-22

## 2022-09-22 RX ORDER — FUROSEMIDE 20 MG/1
20 TABLET ORAL
Qty: 90 TABLET | Refills: 1 | Status: SHIPPED | OUTPATIENT
Start: 2022-09-22

## 2022-09-22 RX ORDER — VALSARTAN 320 MG/1
320 TABLET ORAL DAILY
Qty: 90 TABLET | Refills: 3 | Status: SHIPPED | OUTPATIENT
Start: 2022-09-22

## 2022-09-22 NOTE — PROGRESS NOTES
1. Have you been to the ER, urgent care clinic since your last visit? Hospitalized since your last visit? Yes When: 6/2022 Where: [facility] Reason for visit: TESTICLE SWELLING    2. Have you seen or consulted any other health care providers outside of the [address] [place] [address] since your last visit? Include any pap smears or colon screening. Yes Where: [facility]       3. Since your last visit, have you had any of the following symptoms? no         4. Have you had any blood work, X-rays or cardiac testing? No         5. Where do you normally have your labs drawn? 6. Do you need any refills today?    yes

## 2022-09-22 NOTE — PROGRESS NOTES
HISTORY OF PRESENT ILLNESS  Niko Sims is a 60 y.o. male. Follow-up of CAD, hypertension, chronic diastolic CHF, hyperlipidemia, morbid obesity and tobacco abuse    9/21 seen for preoperative clearance in addition to regular follow-up. Surgeries fistulotomy or excision of thrombosed hemorrhoids with anal fistula  9/22 walks almost a mile every day. Follow-up      Review of Systems   Constitutional:  Negative for chills, diaphoresis, fever, malaise/fatigue and weight loss. HENT:  Negative for nosebleeds. Eyes:  Negative for discharge. Respiratory:  Negative for cough and wheezing. Cardiovascular:  Negative for palpitations, orthopnea, claudication, leg swelling and PND. Gastrointestinal:  Negative for diarrhea, nausea and vomiting. Genitourinary:  Negative for dysuria and hematuria. Musculoskeletal:  Positive for back pain, joint pain and neck pain. Skin:  Negative for rash. Neurological:  Negative for dizziness, seizures and loss of consciousness. Endo/Heme/Allergies:  Negative for polydipsia. Does not bruise/bleed easily. Psychiatric/Behavioral:  Negative for depression and substance abuse. The patient does not have insomnia.     Allergies   Allergen Reactions    Other Food Hives     \"double-crabs\"  Does fine with all other seafood    Seafood Hives     \"deviled-crabs\"  Does fine with all other seafood    Aspirin Hives     Other reaction(s): facial swelling     Grass Pollen Other (comments)     Eye swollen    Simvastatin Other (comments)     Pt states that he is not allergic    Tomato Hives    Liraglutide Other (comments)       Past Medical History:   Diagnosis Date    Adverse effect of anesthesia     sx cx due to blood pressure drop    Allergic rhinitis     Asthma     Atherosclerosis of native coronary artery with angina pectoris ([facility] [address] 5/14/2015    atypical angina, mild stress abnormality may be artifact nl EF by echo     Benign non-nodular prostatic hyperplasia with lower urinary tract symptoms     BPH with obstruction/lower urinary tract symptoms     Bulbous urethral stricture     Chronic epididymitis     Chronic pain     shoulder, neck, knee    Coronary artery disease involving native coronary artery of native heart with angina pectoris ([address] 5/13/2016    chr atypical CP for few seconds only     Diabetes mellitus ([address]     DM (diabetes mellitus) ([address]     DVT (deep venous thrombosis) ([address] 2013    left leg    Emphysema     Epididymal cyst     Erectile dysfunction of organic origin     Essential hypertension     GERD (gastroesophageal reflux disease)     High cholesterol     Hypertension     Impotence     Incomplete bladder emptying     Incontinence     Intermittent urinary stream     Kidney stone     Lower urinary tract symptoms (LUTS)     Microhematuria     Morbid obesity ([address] 4/1/2015    Multiple trauma     LEFT SHOULDER, LEFT WRIST, NECK, AND BACK    Other and unspecified hyperlipidemia 4/1/2015    Paranoid schizophrenia ([address]     Pre-op testing     Preop cardiovascular exam 11/13/2015    wrist surgery; no significant CP; abnl stress ? artifact cleared with mild risk periop     Sciatica     Severe obesity (BMI >= 40) ([address] 11/13/2015    Sinusitis     Spermatocele of epididymis     Type II or unspecified type diabetes mellitus without mention of complication, not stated as uncontrolled 4/1/2015    Urethral stricture     Urinary urgency     Urine leukocytes     Weak urinary stream        Family History   Problem Relation Age of Onset    Stroke Mother 79    Heart Disease Mother     Diabetes Father     Cancer Brother     Heart Attack Neg Hx        Social History     Tobacco Use    Smoking status: Every Day     Packs/day: 0.25     Years: 32.00     Pack years: 8.00     Types: Cigarettes     Start date: 8/1/1976    Smokeless tobacco: Former    Tobacco comments:     per patient stated 1 or 2 a day ready to stop   Substance Use Topics    Alcohol use: No     Alcohol/week: 0.0 standard drinks    Drug use: No        Current Outpatient Medications   Medication Sig    ARIPiprazole (ABILIFY) 15 mg tablet aripiprazole 15 mg tablet    ketoconazole (NIZORAL) 2 % topical cream ketoconazole 2 % topical cream    buPROPion XL (WELLBUTRIN XL) 150 mg tablet as needed. dulaglutide (Trulicity) 0.87 JY/9.4 mL sub-q pen 0.75 mg by SubCUTAneous route every seven (7) days. epinastine 0.05 % drop     insulin NPH/insulin regular (NOVOLIN 70/30, HUMULIN 70/30) 100 unit/mL (70-30) injection by SubCUTAneous route two (2) times daily (with meals). sildenafiL (Revatio) 20 mg tablet Take 1-5 tabs by mouth one hour prior to sexual activity on an empty stomach. potassium chloride SR (K-TAB) 20 mEq tablet TAKE ONE TABLET BY MOUTH DAILY    amLODIPine (NORVASC) 10 mg tablet Take 1 Tablet by mouth daily. hydroCHLOROthiazide (HYDRODIURIL) 25 mg tablet Take 1 Tablet by mouth daily. furosemide (LASIX) 20 mg tablet Take 1 Tablet by mouth daily as needed (edema). pravastatin (PRAVACHOL) 40 mg tablet Take 1 Tablet by mouth nightly. prazosin (MINIPRESS) 5 mg capsule Take 1 Capsule by mouth nightly. tadalafiL (CIALIS) 5 mg tablet Take 1 Tablet by mouth daily. tamsulosin (FLOMAX) 0.4 mg capsule Take 2 capsules by mouth before bedtime  Indications: enlarged prostate with urination problem    valsartan (DIOVAN) 320 mg tablet     Symbicort 80-4.5 mcg/actuation HFAA     triamcinolone acetonide (KENALOG) 0.1 % ointment     hydrOXYzine HCL (ATARAX) 25 mg tablet Take 25 mg by mouth every four (4) hours as needed. hydrocortisone (HYTONE) 2.5 % ointment     budesonide (PULMICORT) 1 mg/2 mL nbsp     fexofenadine (ALLEGRA) 180 mg tablet TAKE 1 TABLET BY MOUTH EVERY DAY IN THE MORNING    HumaLOG Mix 75-25 KwikPen flexpen     levocetirizine (XYZAL) 5 mg tablet TAKE 1 TABLET BY MOUTH EVERY DAY    montelukast (SINGULAIR) 10 mg tablet as needed. escitalopram oxalate (LEXAPRO) 20 mg tablet as needed.     om 3/E/linol/ala/oleic/gla/lip (OMEGA 3-6-9 PO) Take  by mouth. olopatadine 0.7 % drop Paze[place] 0.7 % eye drops   INSTILL 1 DROP ONCE DAILY AS NEEDED FOR ITCHING OR WATERINESS    ergocalciferol (ERGOCALCIFEROL) 50,000 unit capsule Take 1 capsule every week by oral route. naloxone (NARCAN) 4 mg/actuation nasal spray Narcan 4 mg/actuation nasal spray    cromolyn (OPTICROM) 4 % ophthalmic solution cromolyn 4 % eye drops   INSTILL 1 DROP INTO AFFECTED EYE(S) BY OPHTHALMIC ROUTE 4 TIMES PER DAY    alcohol swabs padm Alcohol Prep Pads   use as directed prior to insulin injection 1-2 times daily    Insulin Needles, Disposable, 31 gauge x 5/16\" ndle BD Ultra-Fine Short Pen Needle 31 gauge x 5/16\"   USE TO TEST TWICE DAILY    pen needle, diabetic 32 gauge x 3/16\" ndle Comfort EZ Pen Needles 32 gauge x 3/16\"    oxyCODONE-acetaminophen (PERCOCET 10)  mg per tablet oxycodone-acetaminophen 7.5 mg-500 mg tablet    glucose blood VI test strips (ASCENSIA AUTODISC VI, ONE TOUCH ULTRA TEST VI) strip OneTouch Ultra Blue Test Strip   TO CHECK BLOOD SUGAR TWICE A DAY    benztropine (COGENTIN) 0.5 mg tablet benztropine 0.5 mg tablet    citalopram (CELEXA) 40 mg tablet citalopram 40 mg tablet    diclofenac (VOLTAREN) 1 % gel Voltaren 1 % topical gel    Back Brace misc 1 Each by Does Not Apply route daily. Please order  back brace for help with patient stability and balance  ( [facility] [employer] supply )    ONETOUCH ULTRA TEST strip USED TO CHECK BLOOD SUGAR TWICE A DAY- DX: E11.9    rivaroxaban (XARELTO) 10 mg tablet Take 10 mg by mouth daily (with breakfast). gabapentin (NEURONTIN) 300 mg capsule Take 1 Cap by mouth three (3) times daily. For nerve pain. 1 cap qhs for 1 wk, then increase to 1 cap BID for 1 wk, then increase to 1 cap TID    albuterol (PROVENTIL HFA, VENTOLIN HFA, PROAIR HFA) 90 mcg/actuation inhaler 2 Puffs every four (4) hours as needed. ammonium lactate (LAC-HYDRIN) 12 % lotion Apply  to affected area as needed (feet).     Azelastine (ASTEPRO) 0.15 % (205.5 mcg) nasal spray 1 [place] by Both Nostrils route. polyethylene glycol (MIRALAX) 17 gram/dose powder 17 g as needed. fluticasone (FLONASE) 50 mcg/actuation nasal spray Two squirts both nostrils at bedtime    cyclobenzaprine (FLEXERIL) 5 mg tablet Take 5 mg by mouth three (3) times daily as needed for Muscle Spasm(s). Takes 1 to 2 tablets    topiramate (TOPAMAX) 200 mg tablet Take 200 mg by mouth nightly. 2 tabs at night prn  Indications: migraine prevention    amantadine HCl (SYMMETREL) 100 mg capsule as needed. No current facility-administered medications for this visit. Past Surgical History:   Procedure Laterality Date    HX CARPAL TUNNEL RELEASE Left     HX HEENT  2017    sinus surgery    HX OTHER SURGICAL  2008    sinus    HX OTHER SURGICAL  2009    stress test    HX OTHER SURGICAL  03/31/2016    EPIDURAL INJECTIONS    HX SHOULDER ARTHROSCOPY Left     [facility]  [facility]  09/12/2017    [id number] [place], Cysto, direct visualized internal urethrotomy, Dr. Byron Cassidy UROLOGICAL  02/18/2020    Cystourethroscopy/[facility]rethroplasty - [facility] - Dr. Wagner Bonds       Diagnostic Studies:  7/17 Nuc Stress  IMPRESSION:     1. No evidence of ischemia. 2.  Ejection fraction calculated at 33%    7/17 ECHO  1. Normal LV size and systolic function. EF ~ 55%  2. Grade I diastolic dysfunction. 3. No significant valvular pathology. 10/06/21    ECHO ADULT COMPLETE 10/06/2021 10/6/2021    Interpretation Summary  · Contrast used: DEFINITY. · Image quality for this study was technically difficult. · LV: Calculated LVEF is 50%. Visually measured ejection fraction. Normal cavity size. Mild concentric hypertrophy. Low normal systolic function. Wall motion: normal. Age-appropriate left ventricular diastolic function. · MV: Mitral valve non-specific thickening.     Signed by: Gracie Otero MD on 10/6/2021  1:42 PM    Visit Vitals  BP (!) 181/88   Pulse 90   Ht 5' 9\" (1.753 m)   Wt 131.1 kg (289 lb)   SpO2 97%   BMI 42.68 kg/m²       Mr. Lilibeth Eller has a reminder for a \"due or due soon\" health maintenance. I have asked that he contact his primary care provider for follow-up on this health maintenance. Physical Exam  Constitutional:       General: He is not in acute distress. Appearance: He is well-developed. He is obese. HENT:      Head: Normocephalic and atraumatic. Mouth/Throat:      Dentition: Normal dentition. Eyes:      General: No scleral icterus. Right eye: No discharge. Left eye: No discharge. Neck:      Thyroid: No thyromegaly. Vascular: No carotid bruit or JVD. Cardiovascular:      Rate and Rhythm: Normal rate and regular rhythm. Pulses: Intact distal pulses. Heart sounds: Normal heart sounds, S1 normal and S2 normal. No murmur heard. No friction rub. No gallop. Pulmonary:      Effort: Pulmonary effort is normal.      Breath sounds: Normal breath sounds. No wheezing or rales. Abdominal:      Palpations: Abdomen is soft. There is no mass. Tenderness: There is no abdominal tenderness. Musculoskeletal:      Cervical back: Neck supple. Right lower leg: Edema (1) present. Left lower leg: Edema (1) present. Lymphadenopathy:      Cervical:      Right cervical: No superficial cervical adenopathy. Left cervical: No superficial cervical adenopathy. Skin:     General: Skin is warm and dry. Findings: No rash. Neurological:      Mental Status: He is alert and oriented to person, place, and time.    Psychiatric:         Behavior: Behavior normal.       ASSESSMENT and PLAN  HLD :    Ref Range & Units 7/21/22 0903   Cholesterol 110 - 200 mg/dL 145    Triglyceride 40 - 149 mg/dL 92    HDL >=40 mg/dL 44    Cholesterol/HDL 0.0 - 5.0 3.3    Non-HDL Cholesterol <130 mg/dL 101    LDL CALCULATION 50 - 99 mg/dL 83    VLDL CALCULATION 8 - 30 mg/dL 18 Discussed the patient's above normal BMI with him. I have recommended the following interventions: dietary management education, guidance, and counseling . The BMI follow up plan is as follows: BMI is out of normal parameters and plan is as follows: I have counseled this patient on diet and exercise regimens    Patient advised to stop smoking to reduce future cardiovascular events. Has chronic diastolic CHF. Would avoid diuretics due to history of dizziness. Blood pressure seems to be controlled. 2/2020  Compensated diastolic CHF  Blood pressure controlled with current medications. May proceed with surgery with low cardiac risk. Encouraged smoking cessation to reduce the risk of future cardiovascular events. 9/20 CHF is compensated. NYHA class II. CAD stable. Blood pressure is controlled. Continue same medications. Diet weight and exercise discussed. Smoking cessation reinforced. 3/11/2021 CAD stable. CHF compensated NYHA class II  Continues to smoke discussed various ways encouraged to educate himself and read more about smoking as it and its effects. Blood pressure is controlled. Continue same medications. Lipids are controlled well. Continue statins. Recommended weight loss with which she agrees. Printed Mediterranean diet guidelines. 9/23/2021 CHF is clinically compensated though he has edema and he has not been taking. Represcribed HCTZ. CAD if any is stable as he had mildly abnormal stress test many years ago. EKG is stable. Will clear for surgery if echo does not show any significant new abnormalities. Lipids are controlled. Continue statins. He is trying to walk regularly and wants to lose weight and stop smoking. Will consider Chantix or Wellbutrin postoperatively. Mediterranean diet guidelines printed. 3/10/2022 CHF is compensated NYHA class II but he is not exercising due to recent rectal surgery. Statins are refilled. Blood pressure is controlled. Lipids are controlled. Diet weight and exercise discussed. Mediterranean diet guidelines given. Diagnoses and all orders for this visit:    1. Chronic diastolic congestive heart failure ([address]    2. Essential hypertension  -     amLODIPine (NORVASC) 10 mg tablet; Take 1 Tablet by mouth daily. -     furosemide (LASIX) 20 mg tablet; Take 1 Tablet by mouth daily as needed (edema). -     cloNIDine HCL (CATAPRES) 0.1 mg tablet; Take 1 Tablet by mouth nightly.  -     valsartan (DIOVAN) 320 mg tablet; Take 1 Tablet by mouth daily. -     prazosin (MINIPRESS) 5 mg capsule; Take 1 Capsule by mouth nightly. 3. Severe obesity (BMI >= 40) (HCC)    4. Mixed hyperlipidemia    5. Tobacco dependence      Pertinent laboratory and test data reviewed and discussed with patient. See patient instructions also for other medical advice given    Medications Discontinued During This Encounter   Medication Reason    finasteride (PROSCAR) 5 mg tablet Therapy Completed    trimethoprim-sulfamethoxazole (BACTRIM DS, SEPTRA DS) 160-800 mg per tablet Therapy Completed    amLODIPine (NORVASC) 10 mg tablet REORDER    furosemide (LASIX) 20 mg tablet REORDER       Follow-up and Dispositions    Return in about 6 months (around 3/22/2023), or if symptoms worsen or fail to improve, for BP log x 4-5 days post med changes. 9/20/2022BP elevated. Add clonidine and check the home chart. CHF is compensated with mild chronic edema. As he has mild renal insufficiency, will not increase diuretics. No significant dyspnea complaint. Diet and exercise discussed again. Mediterranean diet guidelines given. Tobacco cessation reinforced.   Lipids at goal.

## 2022-09-22 NOTE — PATIENT INSTRUCTIONS
Medications Discontinued During This Encounter   Medication Reason    finasteride (PROSCAR) 5 mg tablet Therapy Completed    trimethoprim-sulfamethoxazole (BACTRIM DS, SEPTRA DS) 160-800 mg per tablet Therapy Completed    amLODIPine (NORVASC) 10 mg tablet REORDER    furosemide (LASIX) 20 mg tablet REORDER    After the recommended changes have been made in blood pressure medicines, patient advised to keep BP/HR(pulse rate) chart twice daily and bring us results in next 4 to 5 days. Patient may send the results via \"My Chart\" if desired. Please rest for 5-10 minutes before checking blood pressure. Sit on a comfortable chair without crossing the legs and put your arm on a table. We recommend that you use an upper arm cuff. Check the blood pressure 3 times each time you check the blood pressure and record the lowest reading. If you check the blood pressure in both arms, use the higher reading. Learning About the [facility] Diet  What is the Mediterranean diet? The Mediterranean diet is a style of eating rather than a diet plan. It features foods eaten in [place], [place], [place] and [place], and other countries along the [facility]. It emphasizes eating foods like fish, fruits, vegetables, beans, high-fiber breads and whole grains, nuts, and olive oil. This style of eating includes limited red meat, cheese, and sweets. Why choose the Mediterranean diet? A Mediterranean-style diet may improve heart health. It contains more fat than other heart-healthy diets. But the fats are mainly from nuts, unsaturated oils (such as fish oils and olive oil), and certain nut or seed oils (such as canola, soybean, or flaxseed oil). These fats may help protect the heart and blood vessels. How can you get started on the Mediterranean diet? Here are some things you can do to switch to a more Mediterranean way of eating.   What to eat  Eat a variety of fruits and vegetables each day, such as grapes, blueberries, tomatoes, broccoli, peppers, figs, olives, spinach, eggplant, beans, lentils, and chickpeas. Eat a variety of whole-grain foods each day, such as oats, brown rice, and whole wheat bread, pasta, and couscous. Eat fish at least 2 times a week. Try tuna, salmon, mackerel, lake trout, herring, or sardines. Eat moderate amounts of low-fat dairy products, such as milk, cheese, or yogurt. Eat moderate amounts of poultry and eggs. Choose healthy (unsaturated) fats, such as nuts, olive oil, and certain nut or seed oils like canola, soybean, and flaxseed. Limit unhealthy (saturated) fats, such as butter, palm oil, and coconut oil. And limit fats found in animal products, such as meat and dairy products made with whole milk. Try to eat red meat only a few times a month in very small amounts. Limit sweets and desserts to only a few times a week. This includes sugar-sweetened drinks like soda. The Mediterranean diet may also include red wine with your meal--1 glass each day for women and up to 2 glasses a day for men. Tips for eating at home  Use herbs, spices, garlic, lemon zest, and citrus juice instead of salt to add flavor to foods. Add avocado slices to your sandwich instead of lam. Have fish for lunch or dinner instead of red meat. Brush the fish with olive oil, and broil or grill it. Sprinkle your salad with seeds or nuts instead of cheese. Cook with olive or canola oil instead of butter or oils that are high in saturated fat. Switch from 2% milk or whole milk to 1% or fat-free milk. Dip raw vegetables in a vinaigrette dressing or hummus instead of dips made from mayonnaise or sour cream.  Have a piece of fruit for dessert instead of a piece of cake. Try baked apples, or have some dried fruit. Tips for eating out  Try broiled, grilled, baked, or poached fish instead of having it fried or breaded. Ask your  to have your meals prepared with olive oil instead of butter.   Order dishes made with marinara sauce or sauces made from olive oil. Avoid sauces made from cream or mayonnaise. Choose whole-grain breads, whole wheat pasta and pizza crust, brown rice, beans, and lentils. Cut back on butter or margarine on bread. Instead, you can dip your bread in a small amount of olive oil. Ask for a side salad or grilled vegetables instead of french fries or chips. Where can you learn more? Go to http://www.funk.com/  Enter O407 in the search box to learn more about \"Learning About the Mediterranean Diet. \"  Current as of: September 8, 2021               Content Version: 13.2  © 2006-2022 [employer]. Care instructions adapted under license by [employer] (which disclaims liability or warranty for this information). If you have questions about a medical condition or this instruction, always ask your healthcare professional. [address] any warranty or liability for your use of this information.

## 2022-10-17 RX ORDER — CLONIDINE HYDROCHLORIDE 0.1 MG/1
TABLET ORAL 2 TIMES DAILY
COMMUNITY
End: 2022-10-17 | Stop reason: SDUPTHER

## 2022-10-17 RX ORDER — CLONIDINE HYDROCHLORIDE 0.1 MG/1
0.1 TABLET ORAL 2 TIMES DAILY
Qty: 180 TABLET | Refills: 1 | Status: SHIPPED | OUTPATIENT
Start: 2022-10-17

## 2022-11-18 DIAGNOSIS — I50.32 CHRONIC DIASTOLIC CONGESTIVE HEART FAILURE (HCC): Primary | ICD-10-CM

## 2022-11-18 NOTE — TELEPHONE ENCOUNTER
Per Dr Miko Moffett after reviewing BP log from 10/20/22 to 11/16/22    D/c HCTZ  Start Chlorthalidone 25 mg every day   Bmp x 2 weeks  Bp chart after 1 week

## 2022-11-21 RX ORDER — CHLORTHALIDONE 25 MG/1
25 TABLET ORAL DAILY
Qty: 30 TABLET | Refills: 6 | Status: SHIPPED | OUTPATIENT
Start: 2022-11-21

## 2022-11-23 NOTE — TELEPHONE ENCOUNTER
Patient aware of results. He will follow up as scheduled. No other concerns at this time. ----- Message from Sarwat Palacios MD sent at 11/21/2022  5:38 PM EST -----  Please let the patient know his testosterone was normal.     His creatinine or kidney function is up slightly, it might have been from the bactrim. We will repeat this at his appointment with Tony Leventhal. Maria Alejandra Fam MD  [facility] [place], [place]

## 2022-12-02 DIAGNOSIS — I50.32 CHRONIC DIASTOLIC CONGESTIVE HEART FAILURE (HCC): ICD-10-CM

## 2022-12-15 DIAGNOSIS — I10 ESSENTIAL HYPERTENSION: ICD-10-CM

## 2022-12-15 RX ORDER — AMLODIPINE BESYLATE 10 MG/1
10 TABLET ORAL DAILY
Qty: 90 TABLET | Refills: 3 | Status: SHIPPED | OUTPATIENT
Start: 2022-12-15

## 2023-02-17 RX ORDER — POTASSIUM CHLORIDE 20 MEQ/1
20 TABLET, EXTENDED RELEASE ORAL DAILY
Qty: 90 TABLET | Refills: 1 | Status: SHIPPED | OUTPATIENT
Start: 2023-02-17

## 2023-02-22 ENCOUNTER — OFFICE VISIT (OUTPATIENT)
Age: 63
End: 2023-02-22
Payer: MEDICARE

## 2023-02-22 VITALS
WEIGHT: 287 LBS | DIASTOLIC BLOOD PRESSURE: 80 MMHG | OXYGEN SATURATION: 99 % | HEART RATE: 79 BPM | SYSTOLIC BLOOD PRESSURE: 144 MMHG | HEIGHT: 69 IN | BODY MASS INDEX: 42.51 KG/M2

## 2023-02-22 DIAGNOSIS — Z01.810 PREOP CARDIOVASCULAR EXAM: ICD-10-CM

## 2023-02-22 DIAGNOSIS — E78.2 MIXED HYPERLIPIDEMIA: ICD-10-CM

## 2023-02-22 DIAGNOSIS — E66.01 MORBID (SEVERE) OBESITY DUE TO EXCESS CALORIES (HCC): ICD-10-CM

## 2023-02-22 DIAGNOSIS — F17.210 CIGARETTE NICOTINE DEPENDENCE WITHOUT COMPLICATION: ICD-10-CM

## 2023-02-22 DIAGNOSIS — I50.32 CHRONIC DIASTOLIC (CONGESTIVE) HEART FAILURE (HCC): Primary | ICD-10-CM

## 2023-02-22 DIAGNOSIS — I10 ESSENTIAL (PRIMARY) HYPERTENSION: ICD-10-CM

## 2023-02-22 DIAGNOSIS — Z86.718 HISTORY OF DVT IN ADULTHOOD: ICD-10-CM

## 2023-02-22 PROCEDURE — G8484 FLU IMMUNIZE NO ADMIN: HCPCS | Performed by: INTERNAL MEDICINE

## 2023-02-22 PROCEDURE — 3079F DIAST BP 80-89 MM HG: CPT | Performed by: INTERNAL MEDICINE

## 2023-02-22 PROCEDURE — 99214 OFFICE O/P EST MOD 30 MIN: CPT | Performed by: INTERNAL MEDICINE

## 2023-02-22 PROCEDURE — 4004F PT TOBACCO SCREEN RCVD TLK: CPT | Performed by: INTERNAL MEDICINE

## 2023-02-22 PROCEDURE — 3077F SYST BP >= 140 MM HG: CPT | Performed by: INTERNAL MEDICINE

## 2023-02-22 PROCEDURE — G8417 CALC BMI ABV UP PARAM F/U: HCPCS | Performed by: INTERNAL MEDICINE

## 2023-02-22 PROCEDURE — 93000 ELECTROCARDIOGRAM COMPLETE: CPT | Performed by: INTERNAL MEDICINE

## 2023-02-22 PROCEDURE — G8427 DOCREV CUR MEDS BY ELIG CLIN: HCPCS | Performed by: INTERNAL MEDICINE

## 2023-02-22 PROCEDURE — 3017F COLORECTAL CA SCREEN DOC REV: CPT | Performed by: INTERNAL MEDICINE

## 2023-02-22 RX ORDER — CHLORTHALIDONE 25 MG/1
25 TABLET ORAL DAILY
Qty: 30 TABLET | Refills: 5 | Status: SHIPPED | OUTPATIENT
Start: 2023-02-22

## 2023-02-22 RX ORDER — CLONIDINE HYDROCHLORIDE 0.1 MG/1
0.1 TABLET ORAL DAILY
Qty: 90 TABLET | Refills: 3 | Status: SHIPPED | OUTPATIENT
Start: 2023-02-22

## 2023-02-22 RX ORDER — AMLODIPINE BESYLATE 10 MG/1
10 TABLET ORAL DAILY
Qty: 90 TABLET | Refills: 3 | Status: SHIPPED | OUTPATIENT
Start: 2023-02-22

## 2023-02-22 RX ORDER — FUROSEMIDE 20 MG/1
20 TABLET ORAL DAILY PRN
Qty: 90 TABLET | Refills: 3 | Status: SHIPPED | OUTPATIENT
Start: 2023-02-22

## 2023-02-22 ASSESSMENT — ENCOUNTER SYMPTOMS
EYES NEGATIVE: 1
BACK PAIN: 1
GASTROINTESTINAL NEGATIVE: 1
RESPIRATORY NEGATIVE: 1

## 2023-02-22 NOTE — PROGRESS NOTES
Kayce Boykin is a 60y.o. year old male. Follow-up of CAD, hypertension, chronic diastolic CHF, hyperlipidemia, morbid obesity and tobacco abuse    9/21 seen for preoperative clearance in addition to regular follow-up. Surgeries fistulotomy or excision of thrombosed hemorrhoids with anal fistula  9/22 walks almost a mile every day. 2/23 No significant chest pain, shortness of breath, edema, dizziness, palpitations or loss of consciousness          Review of Systems   Constitutional: Negative. HENT: Negative. Eyes: Negative. Respiratory: Negative. Cardiovascular: Negative. Gastrointestinal: Negative. Endocrine: Negative. Genitourinary: Negative. Musculoskeletal:  Positive for arthralgias and back pain. Neurological: Negative. Psychiatric/Behavioral: Negative. All other systems reviewed and are negative. Physical Exam  Vitals and nursing note reviewed. Constitutional:       Appearance: Normal appearance. He is obese. HENT:      Head: Normocephalic and atraumatic. Nose: Nose normal.   Eyes:      Conjunctiva/sclera: Conjunctivae normal.   Cardiovascular:      Rate and Rhythm: Normal rate and regular rhythm. Pulses: Normal pulses. Heart sounds: Normal heart sounds. Pulmonary:      Effort: Pulmonary effort is normal.      Breath sounds: Normal breath sounds. Abdominal:      General: Bowel sounds are normal.      Palpations: Abdomen is soft. Musculoskeletal:         General: Normal range of motion. Right lower leg: Edema (1) present. Left lower leg: Edema (1) present. Skin:     General: Skin is warm and dry. Neurological:      General: No focal deficit present. Mental Status: He is alert and oriented to person, place, and time.    Psychiatric:         Mood and Affect: Mood normal.         Behavior: Behavior normal.      Allergies   Allergen Reactions    Other Hives     \"double-crabs\"  Does fine with all other seafood    Shellfish-Derived Products Hives     \"deviled-crabs\"  Does fine with all other seafood    Aspirin Hives     Other reaction(s): facial swelling     Grass Pollen(K-O-R-T-Swt Sameer) Other (See Comments)     Eye swollen    Simvastatin Other (See Comments)     Pt states that he is not allergic    Tomato Hives    Liraglutide Other (See Comments)       Past Medical History:   Diagnosis Date    Adverse effect of anesthesia     sx cx due to blood pressure drop    Allergic rhinitis     Asthma     Atherosclerosis of native coronary artery with angina pectoris ([address] 5/14/2015    atypical angina, mild stress abnormality may be artifact nl EF by echo     Benign non-nodular prostatic hyperplasia with lower urinary tract symptoms     BPH with obstruction/lower urinary tract symptoms     Bulbous urethral stricture     Chronic epididymitis     Chronic pain     shoulder, neck, knee    Coronary artery disease involving native coronary artery of native heart with angina pectoris ([address] 5/13/2016    chr atypical CP for few seconds only     Diabetes mellitus ([address]     DM (diabetes mellitus) ([address]     DVT (deep venous thrombosis) ([address] 2013    left leg    Emphysema     Epididymal cyst     Erectile dysfunction of organic origin     Essential hypertension     GERD (gastroesophageal reflux disease)     High cholesterol     Hypertension     Impotence     Incomplete bladder emptying     Incontinence     Intermittent urinary stream     Kidney stone     Lower urinary tract symptoms (LUTS)     Microhematuria     Morbid obesity ([address] 4/1/2015    Multiple trauma     LEFT SHOULDER, LEFT WRIST, NECK, AND BACK    Other and unspecified hyperlipidemia 4/1/2015    Paranoid schizophrenia ([address]     Pre-op testing     Preop cardiovascular exam 11/13/2015    wrist surgery; no significant CP; abnl stress ? artifact cleared with mild risk periop     Sciatica     Severe obesity (BMI >= 40) ([address] 11/13/2015    Sinusitis     Spermatocele of epididymis     Type II or unspecified type diabetes mellitus without mention of complication, not stated as uncontrolled 4/1/2015    Urethral stricture     Urinary urgency     Urine leukocytes     Weak urinary stream        Family History   Problem Relation Age of Onset    Stroke Mother 79    Cancer Brother     Heart Disease Mother     Diabetes Father     Heart Attack Neg Hx        Social History     Tobacco Use    Smoking status: Every Day     Packs/day: 0.25     Types: Cigarettes     Start date: 8/1/1976    Smokeless tobacco: Former   Substance Use Topics    Alcohol use: No     Alcohol/week: 0.0 standard drinks    Drug use: No        Current Outpatient Medications   Medication Sig Dispense Refill    albuterol sulfate HFA (PROVENTIL;VENTOLIN;PROAIR) 108 (90 Base) MCG/ACT inhaler 2 puffs every 4 hours as needed      amantadine (SYMMETREL) 100 MG capsule as needed      amLODIPine (NORVASC) 10 MG tablet Take 10 mg by mouth daily      ammonium lactate (LAC-HYDRIN) 12 % lotion Apply topically as needed      ARIPiprazole (ABILIFY) 15 MG tablet aripiprazole 15 mg tablet      azelastine HCl 0.15 % SOLN 1 spray by Nasal route      benztropine (COGENTIN) 0.5 MG tablet benztropine 0.5 mg tablet      budesonide (PULMICORT) 1 MG/2ML nebulizer suspension ceived the following from [facility]: Outside name: budesonide (PULMICORT) 1 mg/2 mL nbsp      budesonide-formoterol (SYMBICORT) 80-4.5 MCG/ACT AERO ceived the following from [facility]: Outside name: Symbicort 80-4.5 mcg/actuation HFAA      buPROPion (WELLBUTRIN XL) 150 MG extended release tablet as needed      chlorthalidone (HYGROTON) 25 MG tablet Take 25 mg by mouth daily      citalopram (CELEXA) 40 MG tablet citalopram 40 mg tablet      cloNIDine (CATAPRES) 0.1 MG tablet Take 0.1 mg by mouth daily      cromolyn (OPTICROM) 4 % ophthalmic solution cromolyn 4 % eye drops   INSTILL 1 DROP INTO AFFECTED EYE(S) BY OPHTHALMIC ROUTE 4 TIMES PER DAY      cyclobenzaprine (FLEXERIL) 5 MG tablet Take 5 mg by mouth 3 times daily as needed      diclofenac sodium (VOLTAREN) 1 % GEL Voltaren 1 % topical gel      Dulaglutide (TRULICITY) 7.30 XK/6.8SB SOPN Inject 0.75 mg into the skin every 7 days      epinastine (ELESTAT) 0.05 % SOLN ceived the following from [facility]: Outside name: epinastine 0.05 % drop      ergocalciferol (ERGOCALCIFEROL) 1.25 MG (40158 UT) capsule Take 1 capsule every week by oral route. escitalopram (LEXAPRO) 20 MG tablet as needed      finasteride (PROSCAR) 5 MG tablet ceived the following from [facility]: Outside name: finasteride (PROSCAR) 5 mg tablet      fluticasone (FLONASE) 50 MCG/ACT nasal spray Two squirts both nostrils at bedtime      furosemide (LASIX) 20 MG tablet Take 20 mg by mouth daily as needed      gabapentin (NEURONTIN) 300 MG capsule Take 300 mg by mouth 3 times daily.       hydrocortisone 2.5 % ointment ceived the following from [facility]: Outside name: hydrocortisone (HYTONE) 2.5 % ointment      hydrOXYzine HCl (ATARAX) 25 MG tablet Take 25 mg by mouth every 4 hours as needed      insulin lispro protamine & lispro (HUMALOG MIX) (75-25) 100 UNIT per ML SUPN injection pen ceived the following from [facility]: Outside name: HumaLOG Mix 75-25 KwikPen flexpen      insulin 70-30 (HUMULIN;NOVOLIN) (70-30) 100 UNIT per ML injection vial Inject into the skin 2 times daily (with meals)      ketoconazole (NIZORAL) 2 % cream ketoconazole 2 % topical cream      levocetirizine (XYZAL) 5 MG tablet TAKE 1 TABLET BY MOUTH EVERY DAY      montelukast (SINGULAIR) 10 MG tablet as needed      naloxone 4 MG/0.1ML LIQD nasal spray Narcan 4 mg/actuation nasal spray      Olopatadine HCl 0.7 % SOLN Pazeo 0.7 % eye drops   INSTILL 1 DROP ONCE DAILY AS NEEDED FOR ITCHING OR WATERINESS      oxyCODONE-acetaminophen (PERCOCET)  MG per tablet oxycodone-acetaminophen 7.5 mg-500 mg tablet      polyethylene glycol (GLYCOLAX) 17 GM/SCOOP powder 17 g as needed      pravastatin (PRAVACHOL) 40 MG tablet Take 40 mg by mouth      prazosin (MINIPRESS) 5 MG capsule Take 5 mg by mouth      rivaroxaban (XARELTO) 10 MG TABS tablet Take 10 mg by mouth daily (with breakfast)      sildenafil (VIAGRA) 100 MG tablet Take 1 tab my mouth as needed 1 hour prior to sexual activity on an empty stomach. Indications: the inability to have an erection      tadalafil (CIALIS) 5 MG tablet Take 5 mg by mouth daily      tamsulosin (FLOMAX) 0.4 MG capsule Take 2 capsules by mouth before bedtime  Indications: enlarged prostate with urination problem      topiramate (TOPAMAX) 200 MG tablet Take 200 mg by mouth      triamcinolone (KENALOG) 0.1 % ointment ceived the following from [facility]: Outside name: triamcinolone acetonide (KENALOG) 0.1 % ointment      valsartan (DIOVAN) 320 MG tablet Take 320 mg by mouth daily      potassium chloride (KLOR-CON M) 20 MEQ extended release tablet Take 1 tablet by mouth daily 90 tablet 1     No current facility-administered medications for this visit. Past Surgical History:   Procedure Laterality Date    CARPAL TUNNEL RELEASE Left     HEENT  2017    sinus surgery    OTHER SURGICAL HISTORY  2008    sinus    OTHER SURGICAL HISTORY  2009    stress test    OTHER SURGICAL HISTORY  03/31/2016    EPIDURAL INJECTIONS    SHOULDER ARTHROSCOPY Left     TURP  07/22/2016    THANIA Ayala, Dr. Cindy López    TURRITIKA  05/20/2016    Annamaria Gosselin, Dr. Franklyn Book  02/18/2020    Cystourethroscopy/Urethroplasty - [facility] - Dr. Skye Hahn  09/12/2017    [id number], Cysto, direct visualized internal urethrotomy, Dr. Burno Clarkenight:    02/22/23 1331   BP: (!) 144/80   Pulse: 79   SpO2: 99%   Weight: 287 lb (130.2 kg)   Height: 5' 9\" (1.753 m)          Diagnostic Studies:  I have reviewed the relevant tests done on the patient and show as follows  EKG tracings reviewed by me today.     No results found for this or any previous visit (from the past 4464 hour(s)). Xray Result (most recent):  XR RIBS LEFT (2 VIEWS) 06/25/2018    Narrative  HISTORY: Left axillary pain after sneezing since past Saturday. Exam: Left ribs. Technique: 4 views of left bony hemithorax. No prior studies for comparison    FINDINGS: 12 ribs are seen without acute fracture-dislocation. Cardiac  silhouette mediastinal structures and soft tissues are essentially unchanged. Impression  IMPRESSION:  1. No acute abnormality. Mr. Jony Raymundo has a reminder for a \"due or due soon\" health maintenance. I have asked that he contact his primary care provider for follow-up on this health maintenance. Diagnostic Studies:  7/17 Nuc Stress  IMPRESSION:     1. No evidence of ischemia. 2.  Ejection fraction calculated at 33%    7/17 ECHO  1. Normal LV size and systolic function. EF ~ 55%  2. Grade I diastolic dysfunction. 3. No significant valvular pathology. 10/06/21    ECHO ADULT COMPLETE 10/06/2021 10/6/2021    Interpretation Summary  · Contrast used: DEFINITY. · Image quality for this study was technically difficult. · LV: Calculated LVEF is 50%. Visually measured ejection fraction. Normal cavity size. Mild concentric hypertrophy. Low normal systolic function. Wall motion: normal. Age-appropriate left ventricular diastolic function. · MV: Mitral valve non-specific thickening. Signed by: Raquel Stallings MD on 10/6/2021  1:42 PM    ASSESSMENT & PLAN    HLD :    Ref Range & Units 7/21/22 0903   Cholesterol 110 - 200 mg/dL 145    Triglyceride 40 - 149 mg/dL 92    HDL >=40 mg/dL 44    Cholesterol/HDL 0.0 - 5.0 3.3    Non-HDL Cholesterol <130 mg/dL 101    LDL CALCULATION 50 - 99 mg/dL 83    VLDL CALCULATION 8 - 30 mg/dL 18        Discussed the patient's above normal BMI with him. I have recommended the following interventions: dietary management education, guidance, and counseling .   The BMI follow up plan is as follows: BMI is out of normal parameters and plan is as follows: I have counseled this patient on diet and exercise regimens    Patient advised to stop smoking to reduce future cardiovascular events. Has chronic diastolic CHF. Would avoid diuretics due to history of dizziness. Blood pressure seems to be controlled. 2/2020  Compensated diastolic CHF  Blood pressure controlled with current medications. May proceed with surgery with low cardiac risk. Encouraged smoking cessation to reduce the risk of future cardiovascular events. 9/20 CHF is compensated. NYHA class II. CAD stable. Blood pressure is controlled. Continue same medications. Diet weight and exercise discussed. Smoking cessation reinforced. 3/11/2021 CAD stable. CHF compensated NYHA class II  Continues to smoke discussed various ways encouraged to educate himself and read more about smoking as it and its effects. Blood pressure is controlled. Continue same medications. Lipids are controlled well. Continue statins. Recommended weight loss with which she agrees. Printed Mediterranean diet guidelines. 9/23/2021 CHF is clinically compensated though he has edema and he has not been taking. Represcribed HCTZ. CAD if any is stable as he had mildly abnormal stress test many years ago. EKG is stable. Will clear for surgery if echo does not show any significant new abnormalities. Lipids are controlled. Continue statins. He is trying to walk regularly and wants to lose weight and stop smoking. Will consider Chantix or Wellbutrin postoperatively. Mediterranean diet guidelines printed. 3/10/2022 CHF is compensated NYHA class II but he is not exercising due to recent rectal surgery. Statins are refilled. Blood pressure is controlled. Lipids are controlled. Diet weight and exercise discussed. Mediterranean diet guidelines given. 9/20/2022BP elevated. Add clonidine and check the home chart. CHF is compensated with mild chronic edema.   As he has mild renal insufficiency, will not increase diuretics.  No significant dyspnea complaint.  Diet and exercise discussed again.  Mediterranean diet guidelines given.  Tobacco cessation reinforced.  Lipids at goal.    Iker was seen today for follow-up.    Diagnoses and all orders for this visit:    Chronic diastolic (congestive) heart failure (HCC)  -     EKG 12 Lead    Essential (primary) hypertension    Morbid (severe) obesity due to excess calories (HCC)    Mixed hyperlipidemia    Cigarette nicotine dependence without complication    Preop cardiovascular exam        Pertinent laboratory and test data reviewed and discussed with patient.  See patient instructions also for other medical advice given    Medications Discontinued During This Encounter   Medication Reason    fexofenadine (ALLEGRA) 180 MG tablet Therapy completed    sulfamethoxazole-trimethoprim (BACTRIM DS;SEPTRA DS) 800-160 MG per tablet Therapy completed       Follow-up and Dispositions    Return in about 6 months (around 8/22/2023), or if symptoms worsen or fail to improve.           Return to ER if any significant CP not relieved by s/l NTG, severe SOB, severe palpitations, loss of consciousness    2/22/2023 CHF is compensated NYHA class II  Has not been able to lose significant weight but is trying.  Mediterranean diet guidelines discussed.  Blood pressure is minimally elevated possibly because of the knee pain.  No adjustment in medications yet.  EKG is normal today.  Lipids were acceptable in 7/22.  Reinforced smoking cessation.  He will be cleared for his urology surgery with low risk.  He takes Xarelto due to history of DVT.  Decision to hold Xarelto should be by vascular.

## 2023-02-22 NOTE — PROGRESS NOTES
Patient did not bring medications        1. Have you been to the ER, urgent care clinic since your last visit? Hospitalized since your last visit? Yes When: 12/2022 Where: [facility] Reason for visit: Fall      2. Where do you normally have your labs drawn?   12/2022    3. Do you need any refills today? yes    4. Which local pharmacy do you use (enter pharmacy)? [facility]    5. Which mail order pharmacy do you use (enter pharmacy)? [facility]     6. Are you here for surgical clearance and if so who will be doing your     procedure/surgery (care team)?    Clearance for urology/ not sure who is doing surgery 3/9/2023

## 2023-05-10 DIAGNOSIS — I50.32 CHRONIC DIASTOLIC (CONGESTIVE) HEART FAILURE (HCC): ICD-10-CM

## 2023-05-10 RX ORDER — FUROSEMIDE 20 MG/1
TABLET ORAL
Qty: 90 TABLET | Refills: 3 | Status: SHIPPED | OUTPATIENT
Start: 2023-05-10

## 2023-06-05 DIAGNOSIS — I10 ESSENTIAL (PRIMARY) HYPERTENSION: ICD-10-CM

## 2023-06-05 RX ORDER — CLONIDINE HYDROCHLORIDE 0.1 MG/1
TABLET ORAL
Qty: 180 TABLET | Refills: 3 | Status: SHIPPED | OUTPATIENT
Start: 2023-06-05

## 2023-06-08 DIAGNOSIS — I10 ESSENTIAL (PRIMARY) HYPERTENSION: ICD-10-CM

## 2023-06-08 RX ORDER — CHLORTHALIDONE 25 MG/1
25 TABLET ORAL DAILY
Qty: 90 TABLET | Refills: 1 | Status: SHIPPED | OUTPATIENT
Start: 2023-06-08

## 2023-08-16 RX ORDER — POTASSIUM CHLORIDE 20 MEQ/1
20 TABLET, EXTENDED RELEASE ORAL DAILY
Qty: 90 TABLET | Refills: 1 | Status: SHIPPED | OUTPATIENT
Start: 2023-08-16

## 2023-09-25 RX ORDER — PRAVASTATIN SODIUM 40 MG
40 TABLET ORAL DAILY
Qty: 90 TABLET | Refills: 1 | Status: SHIPPED | OUTPATIENT
Start: 2023-09-25

## 2023-10-05 PROBLEM — M54.50 LOW BACK PAIN: Status: ACTIVE | Noted: 2018-07-24

## 2023-10-05 PROBLEM — M79.609 PAIN IN LIMB: Status: ACTIVE | Noted: 2017-06-05

## 2023-10-05 PROBLEM — M54.30 SCIATICA: Status: ACTIVE | Noted: 2023-10-05

## 2023-10-05 PROBLEM — E26.1 SECONDARY HYPERALDOSTERONISM (HCC): Status: ACTIVE | Noted: 2021-10-05

## 2023-10-05 PROBLEM — B35.6 TINEA CRURIS: Status: ACTIVE | Noted: 2023-10-05

## 2023-10-05 PROBLEM — I10 BENIGN HYPERTENSION: Status: ACTIVE | Noted: 2020-06-23

## 2023-10-05 PROBLEM — I82.90 VENOUS EMBOLISM: Status: ACTIVE | Noted: 2023-10-05

## 2023-10-05 PROBLEM — L03.019 PARONYCHIA OF FINGER: Status: ACTIVE | Noted: 2023-10-05

## 2023-10-05 PROBLEM — R25.2 SPASM: Status: ACTIVE | Noted: 2023-10-05

## 2023-10-05 PROBLEM — G89.4 CHRONIC PAIN DISORDER: Status: ACTIVE | Noted: 2020-06-25

## 2023-10-05 PROBLEM — F11.20 OPIOID DEPENDENCE (HCC): Status: ACTIVE | Noted: 2021-02-04

## 2023-10-05 PROBLEM — J06.9 ACUTE UPPER RESPIRATORY INFECTION: Status: ACTIVE | Noted: 2023-10-05

## 2023-10-05 PROBLEM — N34.2 URETHRITIS: Status: ACTIVE | Noted: 2023-10-05

## 2023-10-05 PROBLEM — N40.0 BENIGN PROSTATIC HYPERPLASIA: Status: ACTIVE | Noted: 2023-10-05

## 2023-10-05 PROBLEM — M54.9 CHRONIC BACK PAIN: Status: ACTIVE | Noted: 2023-10-05

## 2023-10-05 PROBLEM — N35.919 URETHRAL STRICTURE: Status: ACTIVE | Noted: 2020-02-18

## 2023-10-05 PROBLEM — M25.549 HAND JOINT PAIN: Status: ACTIVE | Noted: 2023-10-05

## 2023-10-05 PROBLEM — E11.21 MICROALBUMINURIC DIABETIC NEPHROPATHY (HCC): Status: ACTIVE | Noted: 2021-02-06

## 2023-10-05 PROBLEM — G89.29 CHRONIC BACK PAIN: Status: ACTIVE | Noted: 2023-10-05

## 2023-10-05 PROBLEM — N18.2 STAGE 2 CHRONIC KIDNEY DISEASE: Status: ACTIVE | Noted: 2021-02-06

## 2023-10-05 PROBLEM — E11.9 TYPE 2 DIABETES MELLITUS WITHOUT COMPLICATION (HCC): Status: ACTIVE | Noted: 2023-10-05

## 2023-10-05 PROBLEM — R60.9 EDEMA: Status: ACTIVE | Noted: 2023-10-05

## 2023-10-05 PROBLEM — I82.409 DEEP VEIN THROMBOSIS (DVT) OF LOWER EXTREMITY (HCC): Status: ACTIVE | Noted: 2023-10-05

## 2023-10-05 PROBLEM — K60.30 ANAL FISTULA: Status: ACTIVE | Noted: 2021-09-15

## 2023-10-05 PROBLEM — N18.31 STAGE 3A CHRONIC KIDNEY DISEASE (HCC): Status: ACTIVE | Noted: 2023-06-09

## 2023-10-05 PROBLEM — T22.00XA: Status: ACTIVE | Noted: 2023-10-05

## 2023-10-05 PROBLEM — J98.01 BRONCHOSPASM: Status: ACTIVE | Noted: 2023-10-05

## 2023-10-05 PROBLEM — M62.838 MUSCLE SPASMS OF NECK: Status: ACTIVE | Noted: 2023-10-05

## 2023-10-05 PROBLEM — H69.90 DYSFUNCTION OF EUSTACHIAN TUBE: Status: ACTIVE | Noted: 2023-10-05

## 2023-10-05 PROBLEM — K60.3 ANAL FISTULA: Status: ACTIVE | Noted: 2021-09-15

## 2023-10-05 PROBLEM — F39 MOOD DISORDER (HCC): Status: ACTIVE | Noted: 2017-09-06

## 2023-10-05 PROBLEM — K64.5 THROMBOSED EXTERNAL HEMORRHOID: Status: ACTIVE | Noted: 2021-09-15

## 2023-10-05 PROBLEM — L73.9 DISEASE OF SEBACEOUS GLANDS: Status: ACTIVE | Noted: 2023-10-05

## 2023-10-05 PROBLEM — R00.2 PALPITATIONS: Status: ACTIVE | Noted: 2023-10-05

## 2023-10-05 PROBLEM — N20.2 CALCULUS OF KIDNEY AND URETER: Status: ACTIVE | Noted: 2018-11-06

## 2023-11-17 ENCOUNTER — OFFICE VISIT (OUTPATIENT)
Age: 63
End: 2023-11-17
Payer: MEDICARE

## 2023-11-17 VITALS
DIASTOLIC BLOOD PRESSURE: 80 MMHG | HEIGHT: 69 IN | HEART RATE: 83 BPM | SYSTOLIC BLOOD PRESSURE: 164 MMHG | BODY MASS INDEX: 42.95 KG/M2 | WEIGHT: 290 LBS

## 2023-11-17 DIAGNOSIS — F17.210 CIGARETTE NICOTINE DEPENDENCE WITHOUT COMPLICATION: ICD-10-CM

## 2023-11-17 DIAGNOSIS — Z86.718 HISTORY OF DVT IN ADULTHOOD: ICD-10-CM

## 2023-11-17 DIAGNOSIS — E78.2 MIXED HYPERLIPIDEMIA: ICD-10-CM

## 2023-11-17 DIAGNOSIS — E66.01 SEVERE OBESITY (BMI >= 40) (HCC): ICD-10-CM

## 2023-11-17 DIAGNOSIS — I10 ESSENTIAL (PRIMARY) HYPERTENSION: ICD-10-CM

## 2023-11-17 DIAGNOSIS — F32.A DEPRESSION, UNSPECIFIED DEPRESSION TYPE: ICD-10-CM

## 2023-11-17 DIAGNOSIS — I50.32 CHRONIC DIASTOLIC (CONGESTIVE) HEART FAILURE (HCC): Primary | ICD-10-CM

## 2023-11-17 PROCEDURE — 3017F COLORECTAL CA SCREEN DOC REV: CPT | Performed by: INTERNAL MEDICINE

## 2023-11-17 PROCEDURE — G8427 DOCREV CUR MEDS BY ELIG CLIN: HCPCS | Performed by: INTERNAL MEDICINE

## 2023-11-17 PROCEDURE — 3077F SYST BP >= 140 MM HG: CPT | Performed by: INTERNAL MEDICINE

## 2023-11-17 PROCEDURE — G8417 CALC BMI ABV UP PARAM F/U: HCPCS | Performed by: INTERNAL MEDICINE

## 2023-11-17 PROCEDURE — 4004F PT TOBACCO SCREEN RCVD TLK: CPT | Performed by: INTERNAL MEDICINE

## 2023-11-17 PROCEDURE — 3079F DIAST BP 80-89 MM HG: CPT | Performed by: INTERNAL MEDICINE

## 2023-11-17 PROCEDURE — 99214 OFFICE O/P EST MOD 30 MIN: CPT | Performed by: INTERNAL MEDICINE

## 2023-11-17 PROCEDURE — G8484 FLU IMMUNIZE NO ADMIN: HCPCS | Performed by: INTERNAL MEDICINE

## 2023-11-17 RX ORDER — BUPROPION HYDROCHLORIDE 300 MG/1
300 TABLET ORAL EVERY MORNING
Qty: 90 TABLET | Refills: 3 | Status: SHIPPED | OUTPATIENT
Start: 2023-11-17

## 2023-11-17 RX ORDER — AMLODIPINE BESYLATE 10 MG/1
10 TABLET ORAL DAILY
Qty: 90 TABLET | Refills: 3 | Status: SHIPPED | OUTPATIENT
Start: 2023-11-17

## 2023-11-17 RX ORDER — CHLORTHALIDONE 25 MG/1
25 TABLET ORAL DAILY
Qty: 90 TABLET | Refills: 1 | Status: SHIPPED | OUTPATIENT
Start: 2023-11-17

## 2023-11-17 RX ORDER — FUROSEMIDE 40 MG/1
40 TABLET ORAL DAILY PRN
Qty: 90 TABLET | Refills: 1 | Status: SHIPPED | OUTPATIENT
Start: 2023-11-17

## 2023-11-17 RX ORDER — PRAVASTATIN SODIUM 40 MG
40 TABLET ORAL DAILY
Qty: 90 TABLET | Refills: 1 | Status: SHIPPED | OUTPATIENT
Start: 2023-11-17

## 2023-11-17 RX ORDER — CLONIDINE HYDROCHLORIDE 0.1 MG/1
0.1 TABLET ORAL 2 TIMES DAILY
Qty: 180 TABLET | Refills: 3 | Status: SHIPPED | OUTPATIENT
Start: 2023-11-17

## 2023-11-17 ASSESSMENT — ENCOUNTER SYMPTOMS
GASTROINTESTINAL NEGATIVE: 1
EYES NEGATIVE: 1
SHORTNESS OF BREATH: 1
BACK PAIN: 1

## 2023-11-17 NOTE — PROGRESS NOTES
Patient did not bring medication today. Went over medication with patient. 1. Have you been to the ER, urgent care clinic since your last visit? Hospitalized since your last visit?     no      2. Where do you normally have your labs drawn? [facility]    3. Do you need any refills today?   no    4. Which local pharmacy do you use (enter pharmacy)? [id number]     5. Which mail order pharmacy do you use (enter pharmacy)? 6. Are you here for surgical clearance and if so who will be doing your     procedure/surgery (care team)?     no
Status: He is alert and oriented to person, place, and time.    Psychiatric:         Mood and Affect: Mood normal.         Behavior: Behavior normal.        Allergies   Allergen Reactions    Other Hives     \"double-crabs\"  Does fine with all other seafood    Shellfish-Derived Products Hives     \"deviled-crabs\"  Does fine with all other seafood    Aspirin Hives     Other reaction(s): facial swelling     Grass Pollen(K-O-R-T-Swt Sameer) Other (See Comments)     Eye swollen    Petroleum Distillate     Simvastatin Other (See Comments)     Pt states that he is not allergic    Tomato Hives    Liraglutide Other (See Comments)       Past Medical History:   Diagnosis Date    Adverse effect of anesthesia     sx cx due to blood pressure drop    Allergic rhinitis     Asthma     Atherosclerosis of native coronary artery with angina pectoris ([address]) 5/14/2015    atypical angina, mild stress abnormality may be artifact nl EF by echo     Benign non-nodular prostatic hyperplasia with lower urinary tract symptoms     Bladder mass     BPH with obstruction/lower urinary tract symptoms     Bulbous urethral stricture     Chronic epididymitis     Chronic pain     shoulder, neck, knee    Coronary artery disease involving native coronary artery of native heart with angina pectoris ([address]) 5/13/2016    chr atypical CP for few seconds only     Diabetes mellitus ([address])     DM (diabetes mellitus) ([address])     DVT (deep venous thrombosis) ([address]) 2013    left leg    Emphysema     Epididymal cyst     Erectile dysfunction of organic origin     Essential hypertension     GERD (gastroesophageal reflux disease)     High cholesterol     Hypertension     Impotence     Incomplete bladder emptying     Incontinence     Intermittent urinary stream     Kidney stone     Lower urinary tract symptoms (LUTS)     Microhematuria     Morbid obesity ([address]) 4/1/2015    Multiple trauma     LEFT SHOULDER, LEFT WRIST, NECK, AND BACK    Other and unspecified hyperlipidemia 4/1/2015

## 2024-01-10 LAB
ANION GAP SERPL CALCULATED.3IONS-SCNC: 11 MMOL/L (ref 3–15)
BUN BLDV-MCNC: 14 MG/DL (ref 6–22)
CALCIUM SERPL-MCNC: 9.1 MG/DL (ref 8.4–10.5)
CHLORIDE BLD-SCNC: 102 MMOL/L (ref 98–110)
CHOLESTEROL/HDL RATIO: 3.8 (ref 0–5)
CHOLESTEROL: 129 MG/DL (ref 110–200)
CO2: 29 MMOL/L (ref 20–32)
CREAT SERPL-MCNC: 1.7 MG/DL (ref 0.8–1.6)
GLOMERULAR FILTRATION RATE: 43.7 ML/MIN/1.73 SQ.M.
GLUCOSE: 129 MG/DL (ref 70–99)
HDLC SERPL-MCNC: 34 MG/DL
LDL CHOLESTEROL CALCULATED: 75 MG/DL (ref 50–99)
LDL/HDL RATIO: 2.2
NON-HDL CHOLESTEROL: 95 MG/DL
POTASSIUM SERPL-SCNC: 3.8 MMOL/L (ref 3.5–5.5)
SODIUM BLD-SCNC: 142 MMOL/L (ref 133–145)
TRIGL SERPL-MCNC: 98 MG/DL (ref 40–149)
VLDLC SERPL CALC-MCNC: 20 MG/DL (ref 8–30)

## 2024-04-23 DIAGNOSIS — I10 ESSENTIAL (PRIMARY) HYPERTENSION: ICD-10-CM

## 2024-04-23 RX ORDER — CHLORTHALIDONE 25 MG/1
25 TABLET ORAL DAILY
Qty: 90 TABLET | Refills: 1 | Status: SHIPPED | OUTPATIENT
Start: 2024-04-23

## 2024-07-10 DIAGNOSIS — I10 ESSENTIAL (PRIMARY) HYPERTENSION: ICD-10-CM

## 2024-07-11 RX ORDER — AMLODIPINE BESYLATE 10 MG/1
10 TABLET ORAL DAILY
Qty: 90 TABLET | Refills: 1 | Status: SHIPPED | OUTPATIENT
Start: 2024-07-11

## 2024-07-18 DIAGNOSIS — I10 ESSENTIAL (PRIMARY) HYPERTENSION: ICD-10-CM

## 2024-07-18 RX ORDER — AMLODIPINE BESYLATE 10 MG/1
10 TABLET ORAL DAILY
Qty: 90 TABLET | Refills: 1 | Status: SHIPPED | OUTPATIENT
Start: 2024-07-18

## 2024-10-01 ENCOUNTER — OFFICE VISIT (OUTPATIENT)
Age: 64
End: 2024-10-01
Payer: MEDICARE

## 2024-10-01 VITALS
HEART RATE: 91 BPM | WEIGHT: 292 LBS | BODY MASS INDEX: 43.25 KG/M2 | HEIGHT: 69 IN | SYSTOLIC BLOOD PRESSURE: 145 MMHG | DIASTOLIC BLOOD PRESSURE: 68 MMHG | OXYGEN SATURATION: 94 %

## 2024-10-01 DIAGNOSIS — I50.32 CHRONIC DIASTOLIC (CONGESTIVE) HEART FAILURE (HCC): Primary | ICD-10-CM

## 2024-10-01 DIAGNOSIS — Z86.718 HISTORY OF DVT IN ADULTHOOD: ICD-10-CM

## 2024-10-01 DIAGNOSIS — E78.2 MIXED HYPERLIPIDEMIA: ICD-10-CM

## 2024-10-01 DIAGNOSIS — Z01.810 PRE-OPERATIVE CARDIOVASCULAR EXAMINATION: ICD-10-CM

## 2024-10-01 DIAGNOSIS — R06.02 SHORTNESS OF BREATH: ICD-10-CM

## 2024-10-01 DIAGNOSIS — F32.A DEPRESSION, UNSPECIFIED DEPRESSION TYPE: ICD-10-CM

## 2024-10-01 DIAGNOSIS — I10 ESSENTIAL (PRIMARY) HYPERTENSION: ICD-10-CM

## 2024-10-01 PROCEDURE — 3078F DIAST BP <80 MM HG: CPT | Performed by: INTERNAL MEDICINE

## 2024-10-01 PROCEDURE — 99214 OFFICE O/P EST MOD 30 MIN: CPT | Performed by: INTERNAL MEDICINE

## 2024-10-01 PROCEDURE — G8427 DOCREV CUR MEDS BY ELIG CLIN: HCPCS | Performed by: INTERNAL MEDICINE

## 2024-10-01 PROCEDURE — 93000 ELECTROCARDIOGRAM COMPLETE: CPT | Performed by: INTERNAL MEDICINE

## 2024-10-01 PROCEDURE — 1123F ACP DISCUSS/DSCN MKR DOCD: CPT | Performed by: INTERNAL MEDICINE

## 2024-10-01 PROCEDURE — 3077F SYST BP >= 140 MM HG: CPT | Performed by: INTERNAL MEDICINE

## 2024-10-01 PROCEDURE — 1036F TOBACCO NON-USER: CPT | Performed by: INTERNAL MEDICINE

## 2024-10-01 PROCEDURE — 3017F COLORECTAL CA SCREEN DOC REV: CPT | Performed by: INTERNAL MEDICINE

## 2024-10-01 PROCEDURE — G8417 CALC BMI ABV UP PARAM F/U: HCPCS | Performed by: INTERNAL MEDICINE

## 2024-10-01 PROCEDURE — G8484 FLU IMMUNIZE NO ADMIN: HCPCS | Performed by: INTERNAL MEDICINE

## 2024-10-01 RX ORDER — CLONIDINE HYDROCHLORIDE 0.1 MG/1
0.1 TABLET ORAL 2 TIMES DAILY
Qty: 180 TABLET | Refills: 3 | Status: SHIPPED | OUTPATIENT
Start: 2024-10-01

## 2024-10-01 RX ORDER — BUPROPION HYDROCHLORIDE 300 MG/1
300 TABLET ORAL EVERY MORNING
Qty: 90 TABLET | Refills: 3 | Status: SHIPPED | OUTPATIENT
Start: 2024-10-01

## 2024-10-01 RX ORDER — AMLODIPINE BESYLATE 10 MG/1
10 TABLET ORAL DAILY
Qty: 90 TABLET | Refills: 3 | Status: SHIPPED | OUTPATIENT
Start: 2024-10-01

## 2024-10-01 RX ORDER — CHLORTHALIDONE 25 MG/1
25 TABLET ORAL DAILY
Qty: 90 TABLET | Refills: 3 | Status: SHIPPED | OUTPATIENT
Start: 2024-10-01

## 2024-10-01 RX ORDER — POTASSIUM CHLORIDE 1500 MG/1
20 TABLET, EXTENDED RELEASE ORAL DAILY
Qty: 90 TABLET | Refills: 3 | Status: SHIPPED | OUTPATIENT
Start: 2024-10-01

## 2024-10-01 RX ORDER — FUROSEMIDE 40 MG
40 TABLET ORAL DAILY PRN
Qty: 90 TABLET | Refills: 3 | Status: SHIPPED | OUTPATIENT
Start: 2024-10-01

## 2024-10-01 RX ORDER — PRAVASTATIN SODIUM 40 MG
40 TABLET ORAL DAILY
Qty: 90 TABLET | Refills: 3 | Status: SHIPPED | OUTPATIENT
Start: 2024-10-01

## 2024-10-01 ASSESSMENT — ENCOUNTER SYMPTOMS
EYES NEGATIVE: 1
BACK PAIN: 1
SHORTNESS OF BREATH: 1
GASTROINTESTINAL NEGATIVE: 1

## 2024-10-01 NOTE — PROGRESS NOTES
Room 6    Medications verbally reviewed.    1. Have you been to the ER, urgent care clinic since your last visit?  Hospitalized since your last visit? No    2.  Where do you normally have your labs drawn?  [facility]      3. Do you need any refills today?  Yes      4. Which local pharmacy do you use?   [facility]      5. Which mail order pharmacy do you use?   None       6. Are you here for surgical clearance? No,  who will be doing your procedure/surgery (care team)?   N/A

## 2024-10-01 NOTE — PROGRESS NOTES
Iker Morrow is a 64 y.o. year old male.    Follow-up of CAD, hypertension, chronic diastolic CHF, hyperlipidemia, morbid obesity and tobacco abuse    9/21 seen for preoperative clearance in addition to regular follow-up.  Surgeries fistulotomy or excision of thrombosed hemorrhoids with anal fistula  9/22 walks almost a mile every day.  2/23 No significant chest pain, shortness of breath, edema, dizziness, palpitations or loss of consciousness  11/17/2023 dyspnea on exertion walking from office to parking lot.  He also has back pain which limits his mobility.  Edema is noted in the legs intermittently which improves on leg elevation.  Denies chest pain or dizziness.  10/1/2024 seen for preop clearance for knee replacement.  Not very active because of knee pain.  Denies any significant dyspnea or chest pain.  Edema as before L>R          Review of Systems   Constitutional: Negative.    HENT: Negative.     Eyes: Negative.    Respiratory:  Positive for shortness of breath.    Cardiovascular:  Positive for leg swelling.   Gastrointestinal: Negative.    Endocrine: Negative.    Genitourinary: Negative.    Musculoskeletal:  Positive for arthralgias and back pain.   Neurological: Negative.    Psychiatric/Behavioral: Negative.     All other systems reviewed and are negative.        Physical Exam  Vitals and nursing note reviewed.   Constitutional:       Appearance: Normal appearance. He is obese.   HENT:      Head: Normocephalic and atraumatic.      Nose: Nose normal.   Eyes:      Conjunctiva/sclera: Conjunctivae normal.   Cardiovascular:      Rate and Rhythm: Normal rate and regular rhythm.      Pulses: Normal pulses.      Heart sounds: Normal heart sounds.   Pulmonary:      Effort: Pulmonary effort is normal.      Breath sounds: Normal breath sounds.   Abdominal:      General: Bowel sounds are normal.      Palpations: Abdomen is soft.   Musculoskeletal:         General: Normal range of motion.      Right lower leg: Edema (1)

## 2024-10-16 ENCOUNTER — HOSPITAL ENCOUNTER (OUTPATIENT)
Facility: HOSPITAL | Age: 64
Discharge: HOME | End: 2024-10-19
Attending: INTERNAL MEDICINE
Payer: COMMERCIAL

## 2024-10-16 ENCOUNTER — HOSPITAL ENCOUNTER (OUTPATIENT)
Facility: HOSPITAL | Age: 64
Discharge: HOME | End: 2024-10-18
Attending: INTERNAL MEDICINE
Payer: COMMERCIAL

## 2024-10-16 VITALS
BODY MASS INDEX: 41.47 KG/M2 | WEIGHT: 280 LBS | HEIGHT: 69 IN | SYSTOLIC BLOOD PRESSURE: 148 MMHG | DIASTOLIC BLOOD PRESSURE: 72 MMHG | HEART RATE: 61 BPM

## 2024-10-16 DIAGNOSIS — Z01.810 PRE-OPERATIVE CARDIOVASCULAR EXAMINATION: ICD-10-CM

## 2024-10-16 DIAGNOSIS — R06.02 SHORTNESS OF BREATH: ICD-10-CM

## 2024-10-16 LAB
ECHO BSA: 2.49 M2
NUC STRESS EJECTION FRACTION: 77 %
STRESS BASELINE DIAS BP: 72 MMHG
STRESS BASELINE HR: 61 BPM
STRESS BASELINE SYS BP: 148 MMHG
STRESS ESTIMATED WORKLOAD: 1 METS
STRESS PEAK DIAS BP: 72 MMHG
STRESS PEAK SYS BP: 148 MMHG
STRESS PERCENT HR ACHIEVED: 48 %
STRESS POST PEAK HR: 75 BPM
STRESS RATE PRESSURE PRODUCT: NORMAL BPM*MMHG
STRESS TARGET HR: 155 BPM
TID: 0.98

## 2024-10-16 PROCEDURE — 3430000000 HC RX DIAGNOSTIC RADIOPHARMACEUTICAL: Performed by: INTERNAL MEDICINE

## 2024-10-16 PROCEDURE — 6360000002 HC RX W HCPCS: Performed by: INTERNAL MEDICINE

## 2024-10-16 PROCEDURE — A9502 TC99M TETROFOSMIN: HCPCS | Performed by: INTERNAL MEDICINE

## 2024-10-16 PROCEDURE — 2580000003 HC RX 258: Performed by: INTERNAL MEDICINE

## 2024-10-16 PROCEDURE — 93017 CV STRESS TEST TRACING ONLY: CPT

## 2024-10-16 RX ORDER — 0.9 % SODIUM CHLORIDE 0.9 %
250 INTRAVENOUS SOLUTION INTRAVENOUS ONCE
Status: COMPLETED | OUTPATIENT
Start: 2024-10-16 | End: 2024-10-16

## 2024-10-16 RX ORDER — REGADENOSON 0.08 MG/ML
0.4 INJECTION, SOLUTION INTRAVENOUS
Status: COMPLETED | OUTPATIENT
Start: 2024-10-16 | End: 2024-10-16

## 2024-10-16 RX ADMIN — TETROFOSMIN 33 MILLICURIE: 1.38 INJECTION, POWDER, LYOPHILIZED, FOR SOLUTION INTRAVENOUS at 10:12

## 2024-10-16 RX ADMIN — SODIUM CHLORIDE 250 ML: 9 INJECTION, SOLUTION INTRAVENOUS at 10:13

## 2024-10-16 RX ADMIN — TETROFOSMIN 10.4 MILLICURIE: 1.38 INJECTION, POWDER, LYOPHILIZED, FOR SOLUTION INTRAVENOUS at 08:25

## 2024-10-16 RX ADMIN — REGADENOSON 0.4 MG: 0.08 INJECTION, SOLUTION INTRAVENOUS at 10:12

## 2025-02-05 ENCOUNTER — OFFICE VISIT (OUTPATIENT)
Age: 65
End: 2025-02-05
Payer: MEDICARE

## 2025-02-05 VITALS
BODY MASS INDEX: 42.95 KG/M2 | OXYGEN SATURATION: 99 % | HEIGHT: 69 IN | HEART RATE: 67 BPM | SYSTOLIC BLOOD PRESSURE: 119 MMHG | WEIGHT: 290 LBS | DIASTOLIC BLOOD PRESSURE: 76 MMHG

## 2025-02-05 DIAGNOSIS — Z01.810 PRE-OPERATIVE CARDIOVASCULAR EXAMINATION: ICD-10-CM

## 2025-02-05 DIAGNOSIS — I50.32 CHRONIC DIASTOLIC (CONGESTIVE) HEART FAILURE (HCC): Primary | ICD-10-CM

## 2025-02-05 DIAGNOSIS — E66.01 SEVERE OBESITY (BMI >= 40): ICD-10-CM

## 2025-02-05 DIAGNOSIS — I10 ESSENTIAL (PRIMARY) HYPERTENSION: ICD-10-CM

## 2025-02-05 DIAGNOSIS — E78.2 MIXED HYPERLIPIDEMIA: ICD-10-CM

## 2025-02-05 PROCEDURE — 1123F ACP DISCUSS/DSCN MKR DOCD: CPT | Performed by: INTERNAL MEDICINE

## 2025-02-05 PROCEDURE — 3017F COLORECTAL CA SCREEN DOC REV: CPT | Performed by: INTERNAL MEDICINE

## 2025-02-05 PROCEDURE — G8427 DOCREV CUR MEDS BY ELIG CLIN: HCPCS | Performed by: INTERNAL MEDICINE

## 2025-02-05 PROCEDURE — 1036F TOBACCO NON-USER: CPT | Performed by: INTERNAL MEDICINE

## 2025-02-05 PROCEDURE — 93000 ELECTROCARDIOGRAM COMPLETE: CPT | Performed by: INTERNAL MEDICINE

## 2025-02-05 PROCEDURE — 3074F SYST BP LT 130 MM HG: CPT | Performed by: INTERNAL MEDICINE

## 2025-02-05 PROCEDURE — 99214 OFFICE O/P EST MOD 30 MIN: CPT | Performed by: INTERNAL MEDICINE

## 2025-02-05 PROCEDURE — G8417 CALC BMI ABV UP PARAM F/U: HCPCS | Performed by: INTERNAL MEDICINE

## 2025-02-05 PROCEDURE — 3078F DIAST BP <80 MM HG: CPT | Performed by: INTERNAL MEDICINE

## 2025-02-05 ASSESSMENT — ENCOUNTER SYMPTOMS
EYES NEGATIVE: 1
SHORTNESS OF BREATH: 1
BACK PAIN: 1
GASTROINTESTINAL NEGATIVE: 1

## 2025-02-05 NOTE — PROGRESS NOTES
Iker Morrow is a 64 y.o. year old male.    Follow-up of CAD, hypertension, chronic diastolic CHF, hyperlipidemia, morbid obesity and tobacco abuse    9/21 seen for preoperative clearance in addition to regular follow-up.  Surgeries fistulotomy or excision of thrombosed hemorrhoids with anal fistula  9/22 walks almost a mile every day.  2/23 No significant chest pain, shortness of breath, edema, dizziness, palpitations or loss of consciousness  11/17/2023 dyspnea on exertion walking from office to parking lot.  He also has back pain which limits his mobility.  Edema is noted in the legs intermittently which improves on leg elevation.  Denies chest pain or dizziness.  10/1/2024 seen for preop clearance for knee replacement.  Not very active because of knee pain.  Denies any significant dyspnea or chest pain.  Edema as before L>R  2/5/2025 GEORGE walking in street, but also has balance issues and the knee pain.  No chest pain dizziness or loss of consciousness.  Had stress test in 10/24 followed by left knee replacement in 11/24 without any consequences.  Has left lower leg edema since knee replacement on that side.          Review of Systems   Constitutional: Negative.    HENT: Negative.     Eyes: Negative.    Respiratory:  Positive for shortness of breath.    Cardiovascular:  Positive for leg swelling.   Gastrointestinal: Negative.    Endocrine: Negative.    Genitourinary: Negative.    Musculoskeletal:  Positive for arthralgias and back pain.   Neurological: Negative.    Psychiatric/Behavioral: Negative.     All other systems reviewed and are negative.        Physical Exam  Vitals and nursing note reviewed.   Constitutional:       Appearance: Normal appearance. He is obese.   HENT:      Head: Normocephalic and atraumatic.      Nose: Nose normal.   Eyes:      Conjunctiva/sclera: Conjunctivae normal.   Cardiovascular:      Rate and Rhythm: Normal rate and regular rhythm.      Pulses: Normal pulses.      Heart sounds: Normal

## 2025-02-05 NOTE — PROGRESS NOTES
1. Have you been to the ER, urgent care clinic since your last visit?  Hospitalized since your last visit? NO    2.  Where do you normally have your labs drawn?  [facility]    3. Do you need any refills today? NO    4. Which local pharmacy do you use (enter pharmacy)?   [facility]    5. Which mail order pharmacy do you use (enter pharmacy)?   N/A     6. Are you here for surgical clearance and if so who will be doing your     procedure/surgery (care team)?   Patient is tentatively, scheduled for Total Right knee Replacement on March 5, 2025 with Dr. Flood ast [facility].

## 2025-02-27 ENCOUNTER — TELEPHONE (OUTPATIENT)
Age: 65
End: 2025-02-27

## 2025-02-27 NOTE — TELEPHONE ENCOUNTER
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 He was cleared by us for knee replacement on 3/5/25 but has since then been in [facility] for his heart. Can you review records and clear patient for the surgery

## 2025-08-06 ENCOUNTER — TELEPHONE (OUTPATIENT)
Dept: CARDIOTHORACIC SURGERY | Age: 65
End: 2025-08-06